# Patient Record
Sex: MALE | Race: WHITE | NOT HISPANIC OR LATINO | Employment: OTHER | URBAN - METROPOLITAN AREA
[De-identification: names, ages, dates, MRNs, and addresses within clinical notes are randomized per-mention and may not be internally consistent; named-entity substitution may affect disease eponyms.]

---

## 2017-02-02 ENCOUNTER — OFFICE VISIT (OUTPATIENT)
Dept: INTERNAL MEDICINE | Facility: CLINIC | Age: 82
End: 2017-02-02

## 2017-02-02 VITALS
TEMPERATURE: 97.3 F | RESPIRATION RATE: 16 BRPM | SYSTOLIC BLOOD PRESSURE: 152 MMHG | DIASTOLIC BLOOD PRESSURE: 62 MMHG | WEIGHT: 181.8 LBS | HEART RATE: 68 BPM

## 2017-02-02 DIAGNOSIS — R73.02 IGT (IMPAIRED GLUCOSE TOLERANCE): ICD-10-CM

## 2017-02-02 DIAGNOSIS — R42 EPISODIC LIGHTHEADEDNESS: ICD-10-CM

## 2017-02-02 DIAGNOSIS — K21.9 GASTROESOPHAGEAL REFLUX DISEASE, ESOPHAGITIS PRESENCE NOT SPECIFIED: ICD-10-CM

## 2017-02-02 DIAGNOSIS — R53.82 CHRONIC FATIGUE: ICD-10-CM

## 2017-02-02 DIAGNOSIS — G25.81 RLS (RESTLESS LEGS SYNDROME): Primary | ICD-10-CM

## 2017-02-02 PROBLEM — M54.30 SCIATICA: Status: ACTIVE | Noted: 2017-02-02

## 2017-02-02 PROBLEM — N40.0 BPH (BENIGN PROSTATIC HYPERPLASIA): Status: ACTIVE | Noted: 2017-02-02

## 2017-02-02 PROBLEM — Z79.899 MEDICATION MANAGEMENT: Status: ACTIVE | Noted: 2017-02-02

## 2017-02-02 PROCEDURE — 99214 OFFICE O/P EST MOD 30 MIN: CPT | Performed by: INTERNAL MEDICINE

## 2017-02-02 PROCEDURE — 93000 ELECTROCARDIOGRAM COMPLETE: CPT | Performed by: INTERNAL MEDICINE

## 2017-02-02 RX ORDER — LANSOPRAZOLE 30 MG/1
30 CAPSULE, DELAYED RELEASE ORAL DAILY
COMMUNITY
End: 2017-05-30 | Stop reason: SDUPTHER

## 2017-02-02 RX ORDER — PRAMIPEXOLE DIHYDROCHLORIDE 0.5 MG/1
1 TABLET ORAL NIGHTLY
Qty: 180 TABLET | Refills: 1 | Status: SHIPPED | OUTPATIENT
Start: 2017-02-02 | End: 2017-05-30 | Stop reason: SDUPTHER

## 2017-02-02 RX ORDER — PRAMIPEXOLE DIHYDROCHLORIDE 0.5 MG/1
0.5 TABLET ORAL 2 TIMES DAILY
Qty: 180 TABLET | Refills: 0 | Status: SHIPPED | OUTPATIENT
Start: 2017-02-02 | End: 2017-02-02

## 2017-02-02 RX ORDER — PRAMIPEXOLE DIHYDROCHLORIDE 0.5 MG/1
0.5 TABLET ORAL 3 TIMES DAILY
COMMUNITY
End: 2017-02-02

## 2017-02-03 LAB
ALBUMIN SERPL-MCNC: 4.3 G/DL (ref 3.5–4.7)
ALBUMIN/GLOB SERPL: 1.5 {RATIO} (ref 1.1–2.5)
ALP SERPL-CCNC: 67 IU/L (ref 39–117)
ALT SERPL-CCNC: 12 IU/L (ref 0–44)
AST SERPL-CCNC: 16 IU/L (ref 0–40)
BASOPHILS # BLD AUTO: 0 X10E3/UL (ref 0–0.2)
BASOPHILS NFR BLD AUTO: 0 %
BILIRUB SERPL-MCNC: 0.5 MG/DL (ref 0–1.2)
BUN SERPL-MCNC: 21 MG/DL (ref 8–27)
BUN/CREAT SERPL: 21 (ref 10–22)
CALCIUM SERPL-MCNC: 9.6 MG/DL (ref 8.6–10.2)
CHLORIDE SERPL-SCNC: 101 MMOL/L (ref 96–106)
CO2 SERPL-SCNC: 25 MMOL/L (ref 18–29)
CREAT SERPL-MCNC: 1.02 MG/DL (ref 0.76–1.27)
EOSINOPHIL # BLD AUTO: 0.1 X10E3/UL (ref 0–0.4)
EOSINOPHIL NFR BLD AUTO: 1 %
ERYTHROCYTE [DISTWIDTH] IN BLOOD BY AUTOMATED COUNT: 15.4 % (ref 12.3–15.4)
GLOBULIN SER CALC-MCNC: 2.8 G/DL (ref 1.5–4.5)
GLUCOSE SERPL-MCNC: 117 MG/DL (ref 65–99)
HBA1C MFR BLD: 6.2 % (ref 4.8–5.6)
HCT VFR BLD AUTO: 41.1 % (ref 37.5–51)
HGB BLD-MCNC: 13.6 G/DL (ref 12.6–17.7)
IMM GRANULOCYTES # BLD: 0 X10E3/UL (ref 0–0.1)
IMM GRANULOCYTES NFR BLD: 0 %
LYMPHOCYTES # BLD AUTO: 1.9 X10E3/UL (ref 0.7–3.1)
LYMPHOCYTES NFR BLD AUTO: 21 %
MCH RBC QN AUTO: 27.1 PG (ref 26.6–33)
MCHC RBC AUTO-ENTMCNC: 33.1 G/DL (ref 31.5–35.7)
MCV RBC AUTO: 82 FL (ref 79–97)
MONOCYTES # BLD AUTO: 0.5 X10E3/UL (ref 0.1–0.9)
MONOCYTES NFR BLD AUTO: 6 %
NEUTROPHILS # BLD AUTO: 6.7 X10E3/UL (ref 1.4–7)
NEUTROPHILS NFR BLD AUTO: 72 %
PLATELET # BLD AUTO: 224 X10E3/UL (ref 150–379)
POTASSIUM SERPL-SCNC: 4.6 MMOL/L (ref 3.5–5.2)
PROT SERPL-MCNC: 7.1 G/DL (ref 6–8.5)
RBC # BLD AUTO: 5.01 X10E6/UL (ref 4.14–5.8)
SODIUM SERPL-SCNC: 140 MMOL/L (ref 134–144)
WBC # BLD AUTO: 9.3 X10E3/UL (ref 3.4–10.8)

## 2017-02-07 ENCOUNTER — APPOINTMENT (OUTPATIENT)
Dept: NEUROLOGY | Facility: HOSPITAL | Age: 82
End: 2017-02-07
Attending: INTERNAL MEDICINE

## 2017-02-08 ENCOUNTER — APPOINTMENT (OUTPATIENT)
Dept: NEUROLOGY | Facility: HOSPITAL | Age: 82
End: 2017-02-08
Attending: INTERNAL MEDICINE

## 2017-02-09 ENCOUNTER — HOSPITAL ENCOUNTER (OUTPATIENT)
Dept: NEUROLOGY | Facility: HOSPITAL | Age: 82
Discharge: HOME | End: 2017-02-09
Attending: INTERNAL MEDICINE | Admitting: INTERNAL MEDICINE

## 2017-02-09 DIAGNOSIS — R42 EPISODIC LIGHTHEADEDNESS: ICD-10-CM

## 2017-02-09 PROCEDURE — 95816 EEG AWAKE AND DROWSY: CPT

## 2017-02-09 PROCEDURE — 95816 EEG AWAKE AND DROWSY: CPT | Performed by: PSYCHIATRY & NEUROLOGY

## 2017-03-06 ENCOUNTER — OFFICE VISIT (OUTPATIENT)
Dept: INTERNAL MEDICINE | Facility: CLINIC | Age: 82
End: 2017-03-06

## 2017-03-06 VITALS
SYSTOLIC BLOOD PRESSURE: 116 MMHG | DIASTOLIC BLOOD PRESSURE: 64 MMHG | WEIGHT: 178 LBS | TEMPERATURE: 98 F | BODY MASS INDEX: 24.11 KG/M2 | HEART RATE: 75 BPM | HEIGHT: 72 IN | OXYGEN SATURATION: 98 % | RESPIRATION RATE: 16 BRPM

## 2017-03-06 DIAGNOSIS — J06.9 VIRAL UPPER RESPIRATORY TRACT INFECTION: Primary | ICD-10-CM

## 2017-03-06 PROCEDURE — 99213 OFFICE O/P EST LOW 20 MIN: CPT | Performed by: INTERNAL MEDICINE

## 2017-03-06 RX ORDER — METHYLPREDNISOLONE 4 MG/1
TABLET ORAL
Qty: 1 EACH | Refills: 0 | Status: SHIPPED | OUTPATIENT
Start: 2017-03-06 | End: 2017-08-28

## 2017-03-06 RX ORDER — AZITHROMYCIN 250 MG/1
TABLET, FILM COATED ORAL
Qty: 6 TABLET | Refills: 0 | Status: SHIPPED | OUTPATIENT
Start: 2017-03-06 | End: 2017-08-28

## 2017-03-06 NOTE — PROGRESS NOTES
Subjective   Jim Romero is a 85 y.o. male.     Chief Complaint   Patient presents with   • Nasal Congestion     chest cold   • Cough     x 4 weeks, taking zpack, prednisone         Cough   This is a new problem. The current episode started 1 to 4 weeks ago. The problem has been unchanged. The problem occurs constantly. The cough is non-productive. Associated symptoms include chest pain, nasal congestion, postnasal drip, rhinorrhea and shortness of breath. Pertinent negatives include no chills, ear congestion, ear pain, fever, headaches, sore throat or wheezing. Nothing aggravates the symptoms. He has tried OTC cough suppressant for the symptoms. The treatment provided mild relief. There is no history of asthma, bronchiectasis, COPD or emphysema.        The following portions of the patient's history were reviewed and updated as appropriate: allergies, current medications, past social history and problem list.    Outpatient Prescriptions Marked as Taking for the 3/6/17 encounter (Office Visit) with Reji Gilman MD   Medication Sig Dispense Refill   • lansoprazole (PREVACID) 30 MG capsule Take 30 mg by mouth Daily.     • pramipexole (MIRAPEX) 0.5 MG tablet Take 2 tablets by mouth Every Night. 180 tablet 1   • tamsulosin (FLOMAX) 0.4 MG capsule 24 hr capsule TAKE TWO CAPSULES BY MOUTH DAILY 180 capsule 2       Review of Systems   Constitutional: Negative for chills, fatigue and fever.   HENT: Positive for congestion, postnasal drip and rhinorrhea. Negative for ear pain, sinus pressure, sore throat and voice change.    Respiratory: Positive for cough and shortness of breath. Negative for wheezing.    Cardiovascular: Positive for chest pain.   Neurological: Negative for headaches.       Objective   Vitals:    03/06/17 1105   BP: 116/64   Pulse: 75   Resp: 16   Temp: 98 °F (36.7 °C)   SpO2: 98%      Last Weight    03/06/17  1105   Weight: 178 lb (80.7 kg)    [unfilled]  Body mass index is 24.14  kg/(m^2).      Physical Exam   Constitutional: He appears well-developed and well-nourished.   HENT:   Head: Normocephalic and atraumatic.   Right Ear: External ear normal.   Left Ear: External ear normal.   Nose: Nose normal.   Mouth/Throat: Oropharynx is clear and moist.   Eyes: Conjunctivae are normal. Pupils are equal, round, and reactive to light.   Pulmonary/Chest: Effort normal and breath sounds normal. No respiratory distress. He has no wheezes. He has no rales.   Skin: Skin is warm and dry.         Problem List Items Addressed This Visit     None      Visit Diagnoses     Viral upper respiratory tract infection    -  Primary        Assessment/Plan   In with 1 month of cough.  Not much sputum production.  A congestion.  His wife and son had the same.  They both cleared up with a Z-Martin.  He took some Zithromax yesterday and it seemed to help.  He needs more.  His been taking OTC medicines.  Will okay another Z-Martin.  Advised to take the whole prescription.  We'll add a Medrol Dosepak.  So took some of his sons prednisone which helped.  He's due for Prevnar in TD AP when he gets better.  He has been a little short of breath the last few days but his lungs are clear on exam.         Dragon disclaimer:   Much of this encounter note is an electronic transcription/translation of spoken language to printed text. The electronic translation of spoken language may permit erroneous, or at times, nonsensical words or phrases to be inadvertently transcribed; Although I have reviewed the note for such errors, some may still exist.

## 2017-05-01 RX ORDER — LANSOPRAZOLE 30 MG/1
CAPSULE, DELAYED RELEASE ORAL
Qty: 90 CAPSULE | Refills: 0 | OUTPATIENT
Start: 2017-05-01

## 2017-05-01 RX ORDER — TAMSULOSIN HYDROCHLORIDE 0.4 MG/1
CAPSULE ORAL
Qty: 180 CAPSULE | Refills: 1 | OUTPATIENT
Start: 2017-05-01

## 2017-05-30 RX ORDER — LANSOPRAZOLE 30 MG/1
30 CAPSULE, DELAYED RELEASE ORAL DAILY
Qty: 90 CAPSULE | Refills: 3 | Status: SHIPPED | OUTPATIENT
Start: 2017-05-30 | End: 2017-08-28

## 2017-05-30 RX ORDER — PRAMIPEXOLE DIHYDROCHLORIDE 0.5 MG/1
1 TABLET ORAL NIGHTLY
Qty: 180 TABLET | Refills: 0 | Status: SHIPPED | OUTPATIENT
Start: 2017-05-30 | End: 2017-08-28

## 2017-05-30 RX ORDER — TAMSULOSIN HYDROCHLORIDE 0.4 MG/1
1 CAPSULE ORAL 2 TIMES DAILY
Qty: 180 CAPSULE | Refills: 0 | Status: SHIPPED | OUTPATIENT
Start: 2017-05-30 | End: 2017-08-20 | Stop reason: SDUPTHER

## 2017-06-05 ENCOUNTER — TELEPHONE (OUTPATIENT)
Dept: INTERNAL MEDICINE | Facility: CLINIC | Age: 82
End: 2017-06-05

## 2017-06-05 RX ORDER — PANTOPRAZOLE SODIUM 40 MG/1
40 TABLET, DELAYED RELEASE ORAL DAILY
Qty: 90 TABLET | Refills: 0 | Status: SHIPPED | OUTPATIENT
Start: 2017-06-05 | End: 2017-11-05 | Stop reason: SDUPTHER

## 2017-06-05 NOTE — TELEPHONE ENCOUNTER
Insurance declined to pay for lansoprazole but will approve any of the following:  Esomeprazole  Omeprazole  Pantoprazole  Pt would like to give one of these a try, with less side effects.  90 day supply    Try the pantoprazole 40 mg, 1 daily, #90, 3 refills.

## 2017-08-21 RX ORDER — TAMSULOSIN HYDROCHLORIDE 0.4 MG/1
CAPSULE ORAL
Qty: 180 CAPSULE | Refills: 0 | Status: SHIPPED | OUTPATIENT
Start: 2017-08-21 | End: 2017-11-12 | Stop reason: SDUPTHER

## 2017-08-28 ENCOUNTER — APPOINTMENT (OUTPATIENT)
Dept: LAB | Facility: HOSPITAL | Age: 82
End: 2017-08-28

## 2017-08-28 ENCOUNTER — OFFICE VISIT (OUTPATIENT)
Dept: INTERNAL MEDICINE | Facility: CLINIC | Age: 82
End: 2017-08-28

## 2017-08-28 VITALS
HEART RATE: 80 BPM | HEIGHT: 72 IN | TEMPERATURE: 98.8 F | SYSTOLIC BLOOD PRESSURE: 104 MMHG | RESPIRATION RATE: 16 BRPM | BODY MASS INDEX: 23.73 KG/M2 | WEIGHT: 175.2 LBS | DIASTOLIC BLOOD PRESSURE: 72 MMHG | OXYGEN SATURATION: 96 %

## 2017-08-28 DIAGNOSIS — R53.82 CHRONIC FATIGUE: ICD-10-CM

## 2017-08-28 DIAGNOSIS — R07.9 CHEST PAIN, UNSPECIFIED TYPE: Primary | ICD-10-CM

## 2017-08-28 LAB
ALBUMIN SERPL-MCNC: 3.9 G/DL (ref 3.5–5.2)
ALBUMIN/GLOB SERPL: 1.2 G/DL
ALP SERPL-CCNC: 58 U/L (ref 39–117)
ALT SERPL W P-5'-P-CCNC: 13 U/L (ref 1–41)
ANION GAP SERPL CALCULATED.3IONS-SCNC: 11.4 MMOL/L
AST SERPL-CCNC: 13 U/L (ref 1–40)
BASOPHILS # BLD AUTO: 0.04 10*3/MM3 (ref 0–0.2)
BASOPHILS NFR BLD AUTO: 0.4 % (ref 0–1.5)
BILIRUB SERPL-MCNC: 0.7 MG/DL (ref 0.1–1.2)
BUN BLD-MCNC: 26 MG/DL (ref 8–23)
BUN/CREAT SERPL: 22.2 (ref 7–25)
CALCIUM SPEC-SCNC: 9.5 MG/DL (ref 8.6–10.5)
CHLORIDE SERPL-SCNC: 103 MMOL/L (ref 98–107)
CO2 SERPL-SCNC: 26.6 MMOL/L (ref 22–29)
CREAT BLD-MCNC: 1.17 MG/DL (ref 0.76–1.27)
DEPRECATED RDW RBC AUTO: 45.7 FL (ref 37–54)
EOSINOPHIL # BLD AUTO: 0.15 10*3/MM3 (ref 0–0.7)
EOSINOPHIL NFR BLD AUTO: 1.7 % (ref 0.3–6.2)
ERYTHROCYTE [DISTWIDTH] IN BLOOD BY AUTOMATED COUNT: 14.7 % (ref 11.5–14.5)
GFR SERPL CREATININE-BSD FRML MDRD: 59 ML/MIN/1.73
GLOBULIN UR ELPH-MCNC: 3.3 GM/DL
GLUCOSE BLD-MCNC: 98 MG/DL (ref 65–99)
HCT VFR BLD AUTO: 40.7 % (ref 40.4–52.2)
HGB BLD-MCNC: 13.3 G/DL (ref 13.7–17.6)
IMM GRANULOCYTES # BLD: 0.02 10*3/MM3 (ref 0–0.03)
IMM GRANULOCYTES NFR BLD: 0.2 % (ref 0–0.5)
LYMPHOCYTES # BLD AUTO: 1.84 10*3/MM3 (ref 0.9–4.8)
LYMPHOCYTES NFR BLD AUTO: 20.5 % (ref 19.6–45.3)
MCH RBC QN AUTO: 27.5 PG (ref 27–32.7)
MCHC RBC AUTO-ENTMCNC: 32.7 G/DL (ref 32.6–36.4)
MCV RBC AUTO: 84.3 FL (ref 79.8–96.2)
MONOCYTES # BLD AUTO: 0.64 10*3/MM3 (ref 0.2–1.2)
MONOCYTES NFR BLD AUTO: 7.1 % (ref 5–12)
NEUTROPHILS # BLD AUTO: 6.27 10*3/MM3 (ref 1.9–8.1)
NEUTROPHILS NFR BLD AUTO: 70.1 % (ref 42.7–76)
PLATELET # BLD AUTO: 212 10*3/MM3 (ref 140–500)
PMV BLD AUTO: 10.6 FL (ref 6–12)
POTASSIUM BLD-SCNC: 4.4 MMOL/L (ref 3.5–5.2)
PROT SERPL-MCNC: 7.2 G/DL (ref 6–8.5)
RBC # BLD AUTO: 4.83 10*6/MM3 (ref 4.6–6)
SODIUM BLD-SCNC: 141 MMOL/L (ref 136–145)
TSH SERPL DL<=0.05 MIU/L-ACNC: 0.97 MIU/ML (ref 0.27–4.2)
WBC NRBC COR # BLD: 8.96 10*3/MM3 (ref 4.5–10.7)

## 2017-08-28 PROCEDURE — 80053 COMPREHEN METABOLIC PANEL: CPT | Performed by: INTERNAL MEDICINE

## 2017-08-28 PROCEDURE — 93000 ELECTROCARDIOGRAM COMPLETE: CPT | Performed by: INTERNAL MEDICINE

## 2017-08-28 PROCEDURE — 99214 OFFICE O/P EST MOD 30 MIN: CPT | Performed by: INTERNAL MEDICINE

## 2017-08-28 PROCEDURE — 84443 ASSAY THYROID STIM HORMONE: CPT | Performed by: INTERNAL MEDICINE

## 2017-08-28 PROCEDURE — 36415 COLL VENOUS BLD VENIPUNCTURE: CPT | Performed by: INTERNAL MEDICINE

## 2017-08-28 PROCEDURE — 85025 COMPLETE CBC W/AUTO DIFF WBC: CPT | Performed by: INTERNAL MEDICINE

## 2017-08-28 NOTE — PROGRESS NOTES
Subjective   Jim Romero is a 86 y.o. male.     Chief Complaint   Patient presents with   • Chest Pain     CHEST TIGHTNESS, SOB FOR 30 MINS ON FRIDAY NIGHT         Chest Pain    This is a new problem. The current episode started in the past 7 days. The onset quality is gradual. The problem occurs constantly. The problem has been resolved. The pain is present in the substernal region. The pain is severe. The quality of the pain is described as heavy and tightness. The pain does not radiate. Associated symptoms include malaise/fatigue. Pertinent negatives include no back pain, exertional chest pressure, nausea, palpitations, shortness of breath or vomiting. The pain is aggravated by nothing. He has tried nothing for the symptoms. Risk factors include male gender, smoking/tobacco exposure and being elderly.   Pertinent negatives for past medical history include no MI.        The following portions of the patient's history were reviewed and updated as appropriate: allergies, current medications, past social history and problem list.    Outpatient Prescriptions Marked as Taking for the 8/28/17 encounter (Office Visit) with Reji Gilman MD   Medication Sig Dispense Refill   • lansoprazole (PREVACID) 30 MG capsule Take 1 capsule by mouth Daily for 90 days. 90 capsule 3   • pantoprazole (PROTONIX) 40 MG EC tablet Take 1 tablet by mouth Daily for 90 days. 90 tablet 0   • pramipexole (MIRAPEX) 0.5 MG tablet Take 2 tablets by mouth Every Night for 90 days. 180 tablet 0   • tamsulosin (FLOMAX) 0.4 MG capsule 24 hr capsule TAKE ONE CAPSULE BY MOUTH TWICE A  capsule 0       Review of Systems   Constitutional: Positive for fatigue and malaise/fatigue.   Respiratory: Negative for shortness of breath.    Cardiovascular: Positive for chest pain. Negative for palpitations.   Gastrointestinal: Negative for nausea and vomiting.   Musculoskeletal: Negative for back pain.       Objective   Vitals:    08/28/17 1556   BP: 104/72    Pulse: 80   Resp: 16   Temp: 98.8 °F (37.1 °C)   SpO2: 96%      Last Weight    08/28/17  1556   Weight: 175 lb 3.2 oz (79.5 kg)    [unfilled]  Body mass index is 23.76 kg/(m^2).      Physical Exam   Constitutional: He appears well-developed and well-nourished.   Cardiovascular: Normal rate, regular rhythm, normal heart sounds and intact distal pulses.  Exam reveals no friction rub.    No murmur heard.  Pulmonary/Chest: Effort normal and breath sounds normal. No respiratory distress. He has no wheezes. He has no rales.   Abdominal: Soft. Bowel sounds are normal. He exhibits no distension and no mass. There is no tenderness. There is no guarding.         Problem List Items Addressed This Visit     None      Visit Diagnoses     Chest pain, unspecified type    -  Primary        Assessment/Plan   Patient is in today with a spell of chest pain that occurred at the [facility] 3 days ago.  As was a low substernal tightness or heaviness.  It lasted about 30 minutes.  No associated shortness of breath but wife states that he was sweaty.  He had to sit down.  With standing watching his grandchildren on the right when it occurred.  The fair offcials came by with a golf cart  and took him to his car.  He was complaining of fatigue.  He's been fatigued on and off for several months now to an unusual degree.  Mostly the end of the day.  Apparently there was a similar spell a month or 2 ago.  He has no cardiac history.  We'll need to check a CBC, CMP, and TSH today.  EKG is unchanged.  T waves are tall in the V2 and V3 leads and chronically so.  If there is no metabolic disturbance I do think he is going to require a stress test.  Probably a stress echo.  He still plays golf regularly has been tiring him out more and more.      ECG 12 Lead  Date/Time: 8/28/2017 5:37 PM  Performed by: DIMITRI SHRESTHA  Authorized by: DIMITRI SHRESTHA   Comparison: compared with previous ECG from 2/2/2017  Similar to previous ECG  Rhythm: sinus  rhythm  Rate: normal  Conduction: conduction normal  ST Segments: ST segments normal  T elevation: V2 and V3  QRS axis: normal  Other: no other findings  Clinical impression: non-specific ECG  Comments: The T waves in leads V2 and V3 are tall.  They are unchanged from prior EKG did 2/2/17.  The EKG is unchanged.                 Dragon disclaimer:   Much of this encounter note is an electronic transcription/translation of spoken language to printed text. The electronic translation of spoken language may permit erroneous, or at times, nonsensical words or phrases to be inadvertently transcribed; Although I have reviewed the note for such errors, some may still exist.

## 2017-08-29 DIAGNOSIS — I20.9 NEW-ONSET ANGINA (HCC): ICD-10-CM

## 2017-08-29 DIAGNOSIS — R07.9 CHEST PAIN, UNSPECIFIED TYPE: Primary | ICD-10-CM

## 2017-08-31 ENCOUNTER — HOSPITAL ENCOUNTER (OUTPATIENT)
Dept: CARDIOLOGY | Facility: HOSPITAL | Age: 82
Discharge: HOME | End: 2017-08-31
Attending: INTERNAL MEDICINE | Admitting: INTERNAL MEDICINE

## 2017-08-31 VITALS
HEART RATE: 70 BPM | BODY MASS INDEX: 23.7 KG/M2 | HEIGHT: 72 IN | DIASTOLIC BLOOD PRESSURE: 63 MMHG | WEIGHT: 175 LBS | SYSTOLIC BLOOD PRESSURE: 129 MMHG

## 2017-08-31 LAB
BH CV ECHO MEAS - ACS: 1.6 CM
BH CV ECHO MEAS - AI DEC SLOPE: 209 CM/SEC^2
BH CV ECHO MEAS - AI MAX PG: 94.5 MMHG
BH CV ECHO MEAS - AI MAX VEL: 486 CM/SEC
BH CV ECHO MEAS - AI P1/2T: 681.1 MSEC
BH CV ECHO MEAS - AO MAX PG (FULL): 6.8 MMHG
BH CV ECHO MEAS - AO MAX PG: 10.9 MMHG
BH CV ECHO MEAS - AO MEAN PG (FULL): 4 MMHG
BH CV ECHO MEAS - AO MEAN PG: 6 MMHG
BH CV ECHO MEAS - AO ROOT AREA (BSA CORRECTED): 1.7
BH CV ECHO MEAS - AO ROOT AREA: 9.6 CM^2
BH CV ECHO MEAS - AO ROOT DIAM: 3.5 CM
BH CV ECHO MEAS - AO V2 MAX: 165 CM/SEC
BH CV ECHO MEAS - AO V2 MEAN: 113 CM/SEC
BH CV ECHO MEAS - AO V2 VTI: 37.5 CM
BH CV ECHO MEAS - ASC AORTA: 3.5 CM
BH CV ECHO MEAS - AVA(I,A): 1.9 CM^2
BH CV ECHO MEAS - AVA(I,D): 1.9 CM^2
BH CV ECHO MEAS - AVA(V,A): 1.9 CM^2
BH CV ECHO MEAS - AVA(V,D): 1.9 CM^2
BH CV ECHO MEAS - BSA(HAYCOCK): 2 M^2
BH CV ECHO MEAS - BSA: 2 M^2
BH CV ECHO MEAS - BZI_BMI: 23.7 KILOGRAMS/M^2
BH CV ECHO MEAS - BZI_METRIC_HEIGHT: 182.9 CM
BH CV ECHO MEAS - BZI_METRIC_WEIGHT: 79.4 KG
BH CV ECHO MEAS - EDV(CUBED): 110.6 ML
BH CV ECHO MEAS - EDV(MOD-SP2): 163 ML
BH CV ECHO MEAS - EDV(MOD-SP4): 191 ML
BH CV ECHO MEAS - EDV(TEICH): 107.5 ML
BH CV ECHO MEAS - EF(CUBED): 73.1 %
BH CV ECHO MEAS - EF(MOD-SP2): 63 %
BH CV ECHO MEAS - EF(MOD-SP4): 60 %
BH CV ECHO MEAS - EF(TEICH): 64.7 %
BH CV ECHO MEAS - ESV(CUBED): 29.8 ML
BH CV ECHO MEAS - ESV(MOD-SP2): 60 ML
BH CV ECHO MEAS - ESV(MOD-SP4): 60 ML
BH CV ECHO MEAS - ESV(TEICH): 37.9 ML
BH CV ECHO MEAS - FS: 35.4 %
BH CV ECHO MEAS - IVS/LVPW: 1
BH CV ECHO MEAS - IVSD: 1.2 CM
BH CV ECHO MEAS - LAT PEAK E' VEL: 8 CM/SEC
BH CV ECHO MEAS - LV MASS(C)D: 219.1 GRAMS
BH CV ECHO MEAS - LV MASS(C)DI: 108.9 GRAMS/M^2
BH CV ECHO MEAS - LV MAX PG: 4.1 MMHG
BH CV ECHO MEAS - LV MEAN PG: 2 MMHG
BH CV ECHO MEAS - LV V1 MAX: 101 CM/SEC
BH CV ECHO MEAS - LV V1 MEAN: 63.1 CM/SEC
BH CV ECHO MEAS - LV V1 VTI: 22.8 CM
BH CV ECHO MEAS - LVIDD: 4.8 CM
BH CV ECHO MEAS - LVIDS: 3.1 CM
BH CV ECHO MEAS - LVOT AREA (M): 3.1 CM^2
BH CV ECHO MEAS - LVOT AREA: 3.1 CM^2
BH CV ECHO MEAS - LVOT DIAM: 2 CM
BH CV ECHO MEAS - LVPWD: 1.2 CM
BH CV ECHO MEAS - MED PEAK E' VEL: 7 CM/SEC
BH CV ECHO MEAS - MV A DUR: 107 SEC
BH CV ECHO MEAS - MV A MAX VEL: 66 CM/SEC
BH CV ECHO MEAS - MV DEC SLOPE: 311 CM/SEC^2
BH CV ECHO MEAS - MV DEC TIME: 187 SEC
BH CV ECHO MEAS - MV E MAX VEL: 61.3 CM/SEC
BH CV ECHO MEAS - MV E/A: 0.93
BH CV ECHO MEAS - MV MAX PG: 3 MMHG
BH CV ECHO MEAS - MV MEAN PG: 1 MMHG
BH CV ECHO MEAS - MV P1/2T MAX VEL: 63.6 CM/SEC
BH CV ECHO MEAS - MV P1/2T: 59.9 MSEC
BH CV ECHO MEAS - MV V2 MAX: 87 CM/SEC
BH CV ECHO MEAS - MV V2 MEAN: 44.2 CM/SEC
BH CV ECHO MEAS - MV V2 VTI: 22.5 CM
BH CV ECHO MEAS - MVA P1/2T LCG: 3.5 CM^2
BH CV ECHO MEAS - MVA(P1/2T): 3.7 CM^2
BH CV ECHO MEAS - MVA(VTI): 3.2 CM^2
BH CV ECHO MEAS - PA ACC TIME: 0.12 SEC
BH CV ECHO MEAS - PA MAX PG (FULL): 2.6 MMHG
BH CV ECHO MEAS - PA MAX PG: 5.2 MMHG
BH CV ECHO MEAS - PA PR(ACCEL): 26.8 MMHG
BH CV ECHO MEAS - PA V2 MAX: 114 CM/SEC
BH CV ECHO MEAS - PI END-D VEL: 63 CM/SEC
BH CV ECHO MEAS - PULM A REVS DUR: 127 SEC
BH CV ECHO MEAS - PULM A REVS VEL: 19.3 CM/SEC
BH CV ECHO MEAS - PULM DIAS VEL: 35.1 CM/SEC
BH CV ECHO MEAS - PULM S/D: 1.2
BH CV ECHO MEAS - PULM SYS VEL: 42.4 CM/SEC
BH CV ECHO MEAS - PVA(V,A): 3.7 CM^2
BH CV ECHO MEAS - PVA(V,D): 3.7 CM^2
BH CV ECHO MEAS - QP/QS: 1.4
BH CV ECHO MEAS - RAP SYSTOLE: 3 MMHG
BH CV ECHO MEAS - RV MAX PG: 2.6 MMHG
BH CV ECHO MEAS - RV MEAN PG: 1 MMHG
BH CV ECHO MEAS - RV V1 MAX: 80.5 CM/SEC
BH CV ECHO MEAS - RV V1 MEAN: 50 CM/SEC
BH CV ECHO MEAS - RV V1 VTI: 19.5 CM
BH CV ECHO MEAS - RVOT AREA: 5.3 CM^2
BH CV ECHO MEAS - RVOT DIAM: 2.6 CM
BH CV ECHO MEAS - RVSP: 33 MMHG
BH CV ECHO MEAS - SI(AO): 179.2 ML/M^2
BH CV ECHO MEAS - SI(CUBED): 40.1 ML/M^2
BH CV ECHO MEAS - SI(LVOT): 35.6 ML/M^2
BH CV ECHO MEAS - SI(TEICH): 34.6 ML/M^2
BH CV ECHO MEAS - SV(AO): 360.8 ML
BH CV ECHO MEAS - SV(CUBED): 80.8 ML
BH CV ECHO MEAS - SV(LVOT): 71.6 ML
BH CV ECHO MEAS - SV(RVOT): 103.5 ML
BH CV ECHO MEAS - SV(TEICH): 69.6 ML
BH CV ECHO MEAS - TAPSE (>1.6): 3 CM2
BH CV ECHO MEAS - TR MAX VEL: 274 CM/SEC
BH CV STRESS ECHO POST STRESS EJECTION FRACTION EF: 75 %
BH CV VAS BP RIGHT ARM: NORMAL MMHG
BH CV XLRA - RV BASE: 2.7 CM
BH CV XLRA - TDI S': 11 CM/SEC
E/E' RATIO: 9
LEFT ATRIUM VOLUME INDEX: 24 ML/M2
LV EF 2D ECHO EST: 60 %
MAXIMAL PREDICTED HEART RATE: 134 BPM
STRESS TARGET HR: 114 BPM

## 2017-08-31 PROCEDURE — 93320 DOPPLER ECHO COMPLETE: CPT | Performed by: INTERNAL MEDICINE

## 2017-08-31 PROCEDURE — C8928 TTE W OR W/O FOL W/CON,STRES: HCPCS

## 2017-08-31 PROCEDURE — 93352 ADMIN ECG CONTRAST AGENT: CPT | Performed by: INTERNAL MEDICINE

## 2017-08-31 PROCEDURE — 25010000002 PERFLUTREN (DEFINITY) 8.476 MG IN SODIUM CHLORIDE 10 ML INJECTION: Performed by: INTERNAL MEDICINE

## 2017-08-31 PROCEDURE — 93017 CV STRESS TEST TRACING ONLY: CPT

## 2017-08-31 PROCEDURE — 93016 CV STRESS TEST SUPVJ ONLY: CPT | Performed by: INTERNAL MEDICINE

## 2017-08-31 PROCEDURE — 93350 STRESS TTE ONLY: CPT | Performed by: INTERNAL MEDICINE

## 2017-08-31 PROCEDURE — 93325 DOPPLER ECHO COLOR FLOW MAPG: CPT | Performed by: INTERNAL MEDICINE

## 2017-08-31 PROCEDURE — 93018 CV STRESS TEST I&R ONLY: CPT | Performed by: INTERNAL MEDICINE

## 2017-08-31 PROCEDURE — 93325 DOPPLER ECHO COLOR FLOW MAPG: CPT

## 2017-08-31 PROCEDURE — 93320 DOPPLER ECHO COMPLETE: CPT

## 2017-08-31 RX ADMIN — SODIUM CHLORIDE 8 ML: 9 INJECTION INTRAMUSCULAR; INTRAVENOUS; SUBCUTANEOUS at 10:45

## 2017-11-06 RX ORDER — PANTOPRAZOLE SODIUM 40 MG/1
TABLET, DELAYED RELEASE ORAL
Qty: 90 TABLET | Refills: 0 | Status: SHIPPED | OUTPATIENT
Start: 2017-11-06 | End: 2018-02-11 | Stop reason: SDUPTHER

## 2017-11-13 RX ORDER — TAMSULOSIN HYDROCHLORIDE 0.4 MG/1
CAPSULE ORAL
Qty: 180 CAPSULE | Refills: 0 | Status: SHIPPED | OUTPATIENT
Start: 2017-11-13 | End: 2018-02-11 | Stop reason: SDUPTHER

## 2018-01-02 ENCOUNTER — TELEPHONE (OUTPATIENT)
Dept: INTERNAL MEDICINE | Facility: CLINIC | Age: 83
End: 2018-01-02

## 2018-01-02 RX ORDER — OSELTAMIVIR PHOSPHATE 75 MG/1
75 CAPSULE ORAL
Qty: 10 CAPSULE | Refills: 0 | Status: SHIPPED | OUTPATIENT
Start: 2018-01-02 | End: 2018-01-07

## 2018-01-02 RX ORDER — PROMETHAZINE HYDROCHLORIDE 25 MG/1
25 SUPPOSITORY RECTAL EVERY 6 HOURS PRN
Qty: 10 SUPPOSITORY | Refills: 0 | Status: SHIPPED | OUTPATIENT
Start: 2018-01-02 | End: 2018-05-17

## 2018-01-02 NOTE — TELEPHONE ENCOUNTER
Pt's wife called stating that pt has gotten the flu today, with symptoms: nausea, vomiting, fever, body aches, and is unable to get up from bed.  Requesting a suppository for nausea/vomiting.  I recommended he come in, but May states that pt is  unable to leave his bed.  Please advise.    Okay to prescribe Phenergan suppository 25 mg every 6 hours when necessary nausea and vomiting.  #10.  Also begin Tamiflu 75 mg twice a day for 5 days.

## 2018-02-12 RX ORDER — TAMSULOSIN HYDROCHLORIDE 0.4 MG/1
CAPSULE ORAL
Qty: 180 CAPSULE | Refills: 0 | Status: SHIPPED | OUTPATIENT
Start: 2018-02-12 | End: 2018-05-17 | Stop reason: SDUPTHER

## 2018-02-12 RX ORDER — PANTOPRAZOLE SODIUM 40 MG/1
TABLET, DELAYED RELEASE ORAL
Qty: 90 TABLET | Refills: 0 | Status: SHIPPED | OUTPATIENT
Start: 2018-02-12 | End: 2018-05-17 | Stop reason: SDUPTHER

## 2018-02-12 RX ORDER — PRAMIPEXOLE DIHYDROCHLORIDE 0.5 MG/1
TABLET ORAL
Qty: 180 TABLET | Refills: 0 | Status: SHIPPED | OUTPATIENT
Start: 2018-02-12 | End: 2018-05-17 | Stop reason: SDUPTHER

## 2018-05-14 RX ORDER — PANTOPRAZOLE SODIUM 40 MG/1
TABLET, DELAYED RELEASE ORAL
Qty: 90 TABLET | Refills: 0 | OUTPATIENT
Start: 2018-05-14

## 2018-05-14 RX ORDER — TAMSULOSIN HYDROCHLORIDE 0.4 MG/1
CAPSULE ORAL
Qty: 180 CAPSULE | Refills: 0 | OUTPATIENT
Start: 2018-05-14

## 2018-05-14 RX ORDER — PRAMIPEXOLE DIHYDROCHLORIDE 0.5 MG/1
TABLET ORAL
Qty: 180 TABLET | Refills: 0 | OUTPATIENT
Start: 2018-05-14

## 2018-05-17 ENCOUNTER — OFFICE VISIT (OUTPATIENT)
Dept: INTERNAL MEDICINE | Facility: CLINIC | Age: 83
End: 2018-05-17

## 2018-05-17 VITALS
DIASTOLIC BLOOD PRESSURE: 62 MMHG | TEMPERATURE: 97.9 F | OXYGEN SATURATION: 94 % | RESPIRATION RATE: 16 BRPM | HEART RATE: 78 BPM | HEIGHT: 72 IN | SYSTOLIC BLOOD PRESSURE: 114 MMHG | WEIGHT: 178.8 LBS | BODY MASS INDEX: 24.22 KG/M2

## 2018-05-17 DIAGNOSIS — N40.1 BENIGN PROSTATIC HYPERPLASIA WITH NOCTURIA: ICD-10-CM

## 2018-05-17 DIAGNOSIS — K21.9 GASTROESOPHAGEAL REFLUX DISEASE, ESOPHAGITIS PRESENCE NOT SPECIFIED: ICD-10-CM

## 2018-05-17 DIAGNOSIS — G25.81 RLS (RESTLESS LEGS SYNDROME): ICD-10-CM

## 2018-05-17 DIAGNOSIS — R35.1 BENIGN PROSTATIC HYPERPLASIA WITH NOCTURIA: ICD-10-CM

## 2018-05-17 DIAGNOSIS — Z23 NEED FOR VACCINATION: ICD-10-CM

## 2018-05-17 DIAGNOSIS — R73.02 IGT (IMPAIRED GLUCOSE TOLERANCE): Primary | ICD-10-CM

## 2018-05-17 PROCEDURE — G0009 ADMIN PNEUMOCOCCAL VACCINE: HCPCS | Performed by: INTERNAL MEDICINE

## 2018-05-17 PROCEDURE — 90670 PCV13 VACCINE IM: CPT | Performed by: INTERNAL MEDICINE

## 2018-05-17 PROCEDURE — G0439 PPPS, SUBSEQ VISIT: HCPCS | Performed by: INTERNAL MEDICINE

## 2018-05-17 PROCEDURE — 90471 IMMUNIZATION ADMIN: CPT | Performed by: INTERNAL MEDICINE

## 2018-05-17 PROCEDURE — 90715 TDAP VACCINE 7 YRS/> IM: CPT | Performed by: INTERNAL MEDICINE

## 2018-05-17 PROCEDURE — 99213 OFFICE O/P EST LOW 20 MIN: CPT | Performed by: INTERNAL MEDICINE

## 2018-05-17 NOTE — PROGRESS NOTES
QUICK REFERENCE INFORMATION:  The ABCs of the Annual Wellness Visit    Subsequent Medicare Wellness Visit    HEALTH RISK ASSESSMENT    3/17/1931    Recent Hospitalizations:  No hospitalization(s) within the last year..        Current Medical Providers:  Patient Care Team:  Reji Gilman MD as PCP - Internal Medicine (Internal Medicine)  Reji Gilman MD as PCP - Claims Attributed        Smoking Status:  History   Smoking Status   • Former Smoker   • Quit date: 1959   Smokeless Tobacco   • Never Used       Alcohol Consumption:  History   Alcohol Use   • Yes     Comment: 1-2 x month       Depression Screen:   PHQ-2/PHQ-9 Depression Screening 5/17/2018   Little interest or pleasure in doing things 0   Feeling down, depressed, or hopeless 0   Trouble falling or staying asleep, or sleeping too much 3   Feeling tired or having little energy 3   Poor appetite or overeating 2   Feeling bad about yourself - or that you are a failure or have let yourself or your family down 0   Trouble concentrating on things, such as reading the newspaper or watching television 0   Moving or speaking so slowly that other people could have noticed. Or the opposite - being so fidgety or restless that you have been moving around a lot more than usual 0   Thoughts that you would be better off dead, or of hurting yourself in some way 0   Total Score 8   If you checked off any problems, how difficult have these problems made it for you to do your work, take care of things at home, or get along with other people? Somewhat difficult       Health Habits and Functional and Cognitive Screening:  Functional & Cognitive Status 5/17/2018   Do you have difficulty preparing food and eating? No   Do you have difficulty bathing yourself, getting dressed or grooming yourself? No   Do you have difficulty using the toilet? No   Do you have difficulty moving around from place to place? No   Do you have trouble with steps or getting out of a bed or a chair? No    In the past year have you fallen or experienced a near fall? No   Current Diet Well Balanced Diet   Dental Exam Up to date   Eye Exam Not up to date   Exercise (times per week) 4 times per week   Current Exercise Activities Include Yard Work   Do you need help using the phone?  No   Are you deaf or do you have serious difficulty hearing?  Yes   Do you need help with transportation? No   Do you need help shopping? No   Do you need help preparing meals?  Yes   Do you need help with housework?  No   Do you need help with laundry? No   Do you need help taking your medications? No   Do you need help managing money? No   Do you ever drive or ride in a car without wearing a seat belt? No   Have you felt unusual stress, anger or loneliness in the last month? No   Who do you live with? Spouse   If you need help, do you have trouble finding someone available to you? No   Have you been bothered in the last four weeks by sexual problems? No   Do you have difficulty concentrating, remembering or making decisions? No           Does the patient have evidence of cognitive impairment? No    Aspirin use counseling: Does not need ASA (and currently is not on it)      Recent Lab Results:  CMP:  Lab Results   Component Value Date     (H) 02/02/2017    BUN 26 (H) 08/28/2017    CREATININE 1.17 08/28/2017    EGFRIFNONA 59 (L) 08/28/2017    EGFRIFAFRI 77 02/02/2017    BCR 22.2 08/28/2017     08/28/2017    K 4.4 08/28/2017    CO2 26.6 08/28/2017    CALCIUM 9.5 08/28/2017    PROTENTOTREF 7.1 02/02/2017    ALBUMIN 3.90 08/28/2017    LABGLOBREF 2.8 02/02/2017    LABIL2 1.2 08/28/2017    BILITOT 0.7 08/28/2017    ALKPHOS 58 08/28/2017    AST 13 08/28/2017    ALT 13 08/28/2017     Lipid Panel:     HbA1c:  Lab Results   Component Value Date    HGBA1C 6.2 (H) 02/02/2017       Visual Acuity:  No exam data present    Age-appropriate Screening Schedule:  Refer to the list below for future screening recommendations based on patient's  age, sex and/or medical conditions. Orders for these recommended tests are listed in the plan section. The patient has been provided with a written plan.    Health Maintenance   Topic Date Due   • TDAP/TD VACCINES (1 - Tdap) 03/17/1950   • PNEUMOCOCCAL VACCINES (65+ LOW/MEDIUM RISK) (1 of 2 - PCV13) 03/17/1996   • INFLUENZA VACCINE  08/01/2018   • ZOSTER VACCINE  Excluded        Subjective   History of Present Illness    Jim Romero is a 87 y.o. male who presents for an Subsequent Wellness Visit.    The following portions of the patient's history were reviewed and updated as appropriate: allergies, current medications, past family history, past medical history, past social history, past surgical history and problem list.    Outpatient Medications Prior to Visit   Medication Sig Dispense Refill   • pantoprazole (PROTONIX) 40 MG EC tablet TAKE ONE TABLET BY MOUTH DAILY 90 tablet 0   • pramipexole (MIRAPEX) 0.5 MG tablet TAKE ONE TABLET BY MOUTH TWICE A  tablet 0   • tamsulosin (FLOMAX) 0.4 MG capsule 24 hr capsule TAKE ONE CAPSULE BY MOUTH TWICE A  capsule 0   • promethazine (PHENERGAN) 25 MG suppository Insert 1 suppository into the rectum Every 6 (Six) Hours As Needed for Nausea or Vomiting. 10 suppository 0     No facility-administered medications prior to visit.        Patient Active Problem List   Diagnosis   • RLS (restless legs syndrome)   • BPH (benign prostatic hyperplasia)   • GERD (gastroesophageal reflux disease)   • Sciatica   • Chronic fatigue   • IGT (impaired glucose tolerance)       Advance Care Planning:  has an advance directive - a copy HAS NOT been provided. Have asked the patient to send this to us to add to record.    Identification of Risk Factors:  Risk factors include: inactivity and hearing limitations.    Review of Systems    Compared to one year ago, the patient feels his physical health is the same.  Compared to one year ago, the patient feels his mental health is the  "same.    Objective     Physical Exam    Vitals:    05/17/18 1443   BP: 114/62   Pulse: 78   Resp: 16   Temp: 97.9 °F (36.6 °C)   TempSrc: Oral   SpO2: 94%   Weight: 81.1 kg (178 lb 12.8 oz)   Height: 182.9 cm (72.01\")   PainSc: 0-No pain       Patient's Body mass index is 24.24 kg/m². BMI is within normal parameters. No follow-up required.      Assessment/Plan   Patient Self-Management and Personalized Health Advice  The patient has been provided with information about: exercise and preventive services including:   · Exercise counseling provided.    Visit Diagnoses:  No diagnosis found.    No orders of the defined types were placed in this encounter.      Outpatient Encounter Prescriptions as of 5/17/2018   Medication Sig Dispense Refill   • pantoprazole (PROTONIX) 40 MG EC tablet TAKE ONE TABLET BY MOUTH DAILY 90 tablet 0   • pramipexole (MIRAPEX) 0.5 MG tablet TAKE ONE TABLET BY MOUTH TWICE A  tablet 0   • tamsulosin (FLOMAX) 0.4 MG capsule 24 hr capsule TAKE ONE CAPSULE BY MOUTH TWICE A  capsule 0   • [DISCONTINUED] promethazine (PHENERGAN) 25 MG suppository Insert 1 suppository into the rectum Every 6 (Six) Hours As Needed for Nausea or Vomiting. 10 suppository 0     No facility-administered encounter medications on file as of 5/17/2018.        Reviewed use of high risk medication in the elderly: yes  Reviewed for potential of harmful drug interactions in the elderly: yes    Follow Up:  No Follow-up on file.     An After Visit Summary and PPPS with all of these plans were given to the patient.         "

## 2018-05-17 NOTE — PATIENT INSTRUCTIONS
Hearing Loss  Hearing loss is a partial or total loss of the ability to hear. This can be temporary or permanent, and it can happen in one or both ears. Hearing loss may be referred to as deafness.  Medical care is necessary to treat hearing loss properly and to prevent the condition from getting worse. Your hearing may partially or completely come back, depending on what caused your hearing loss and how severe it is. In some cases, hearing loss is permanent.  What are the causes?  Common causes of hearing loss include:  · Too much wax in the ear canal.  · Infection of the ear canal or middle ear.  · Fluid in the middle ear.  · Injury to the ear or surrounding area.  · An object stuck in the ear.  · Prolonged exposure to loud sounds, such as music.  Less common causes of hearing loss include:  · Tumors in the ear.  · Viral or bacterial infections, such as meningitis.  · A hole in the eardrum (perforated eardrum).  · Problems with the hearing nerve that sends signals between the brain and the ear.  · Certain medicines.  What are the signs or symptoms?  Symptoms of this condition may include:  · Difficulty telling the difference between sounds.  · Difficulty following a conversation when there is background noise.  · Lack of response to sounds in your environment. This may be most noticeable when you do not respond to startling sounds.  · Needing to turn up the volume on the television, radio, etc.  · Ringing in the ears.  · Dizziness.  · Pain in the ears.  How is this diagnosed?  This condition is diagnosed based on a physical exam and a hearing test (audiometry). The audiometry test will be performed by a hearing specialist (audiologist). You may also be referred to an ear, nose, and throat (ENT) specialist (otolaryngologist).  How is this treated?  Treatment for recent onset of hearing loss may include:  · Ear wax removal.  · Being prescribed medicines to prevent infection (antibiotics).  · Being prescribed  medicines to reduce inflammation (corticosteroids).  Follow these instructions at home:  · If you were prescribed an antibiotic medicine, take it as told by your health care provider. Do not stop taking the antibiotic even if you start to feel better.  · Take over-the-counter and prescription medicines only as told by your health care provider.  · Avoid loud noises.  · Return to your normal activities as told by your health care provider. Ask your health care provider what activities are safe for you.  · Keep all follow-up visits as told by your health care provider. This is important.  Contact a health care provider if:  · You feel dizzy.  · You develop new symptoms.  · You vomit or feel nauseous.  · You have a fever.  Get help right away if:  · You develop sudden changes in your vision.  · You have severe ear pain.  · You have new or increased weakness.  · You have a severe headache.  This information is not intended to replace advice given to you by your health care provider. Make sure you discuss any questions you have with your health care provider.  Document Released: 12/18/2006 Document Revised: 05/25/2017 Document Reviewed: 05/04/2016  [employer] Interactive Patient Education © 2017 [employer]  Aspirin and Your Heart  Aspirin is a medicine that affects the way blood clots. Aspirin can be used to help reduce the risk of blood clots, heart attacks, and other heart-related problems.  Should I take aspirin?  Your health care provider will help you determine whether it is safe and beneficial for you to take aspirin daily. Taking aspirin daily may be beneficial if you:  · Have had a heart attack or chest pain.  · Have undergone open heart surgery such as coronary artery bypass surgery (CABG).  · Have had coronary angioplasty.  · Have experienced a stroke or transient ischemic attack (TIA).  · Have peripheral vascular disease (PVD).  · Have chronic heart rhythm problems such as atrial fibrillation.  Are there any  risks of taking aspirin daily?  Daily use of aspirin can increase your risk of side effects. Some of these include:  · Bleeding. Bleeding problems can be minor or serious. An example of a minor problem is a cut that does not stop bleeding. An example of a more serious problem is stomach bleeding or bleeding into the brain. Your risk of bleeding is increased if you are also taking non-steroidal anti-inflammatory medicine (NSAIDs).  · Increased bruising.  · Upset stomach.  · An allergic reaction. People who have nasal polyps have an increased risk of developing an aspirin allergy.  What are some guidelines I should follow when taking aspirin?  · Take aspirin only as directed by your health care provider. Make sure you understand how much you should take and what form you should take. The two forms of aspirin are:  ¨ Non-enteric-coated. This type of aspirin does not have a coating and is absorbed quickly. Non-enteric-coated aspirin is usually recommended for people with chest pain. This type of aspirin also comes in a chewable form.  ¨ Enteric-coated. This type of aspirin has a special coating that releases the medicine very slowly. Enteric-coated aspirin causes less stomach upset than non-enteric-coated aspirin. This type of aspirin should not be chewed or crushed.  · Drink alcohol in moderation. Drinking alcohol increases your risk of bleeding.  When should I seek medical care?  · You have unusual bleeding or bruising.  · You have stomach pain.  · You have an allergic reaction. Symptoms of an allergic reaction include:  ¨ Hives.  ¨ Itchy skin.  ¨ Swelling of the lips, tongue, or face.  · You have ringing in your ears.  When should I seek immediate medical care?  · Your bowel movements are bloody, dark red, or black in color.  · You vomit or cough up blood.  · You have blood in your urine.  · You cough, wheeze, or feel short of breath.  If you have any of the following symptoms, this is an emergency. Do not wait to  see if the pain will go away. Get medical help at once. Call your local emergency services (911 in the [place] Do not drive yourself to the hospital.  · You have severe chest pain, especially if the pain is crushing or pressure-like and spreads to the arms, back, neck, or jaw.  · You have stroke-like symptoms, such as:  ¨ Loss of vision.  ¨ Difficulty talking.  ¨ Numbness or weakness on one side of your body.  ¨ Numbness or weakness in your arm or leg.  ¨ Not thinking clearly or feeling confused.  This information is not intended to replace advice given to you by your health care provider. Make sure you discuss any questions you have with your health care provider.  Document Released: 11/30/2009 Document Revised: 04/26/2017 Document Reviewed: 03/25/2015  [employer] Interactive Patient Education © 2017 [employer]

## 2018-05-17 NOTE — PROGRESS NOTES
Subjective   Jim Romero is a 87 y.o. male.     Chief Complaint   Patient presents with   • Restless Legs Syndrome   • Fatigue   • Hyperglycemia     dry mouth x 2 weeks         Fatigue   This is a chronic problem. The current episode started more than 1 year ago. The problem occurs constantly. The problem has been unchanged. Associated symptoms include fatigue. Pertinent negatives include no abdominal pain, chest pain, chills or fever. Nothing aggravates the symptoms. He has tried nothing for the symptoms.   Hyperglycemia   This is a chronic problem. The current episode started more than 1 year ago. The problem occurs constantly. Associated symptoms include fatigue. Pertinent negatives include no abdominal pain, chest pain, chills or fever.        The following portions of the patient's history were reviewed and updated as appropriate: allergies, current medications, past social history and problem list.    Outpatient Prescriptions Marked as Taking for the 5/17/18 encounter (Office Visit) with Reji Gilman MD   Medication Sig Dispense Refill   • pantoprazole (PROTONIX) 40 MG EC tablet TAKE ONE TABLET BY MOUTH DAILY 90 tablet 0   • pramipexole (MIRAPEX) 0.5 MG tablet TAKE ONE TABLET BY MOUTH TWICE A  tablet 0   • tamsulosin (FLOMAX) 0.4 MG capsule 24 hr capsule TAKE ONE CAPSULE BY MOUTH TWICE A  capsule 0   • [DISCONTINUED] promethazine (PHENERGAN) 25 MG suppository Insert 1 suppository into the rectum Every 6 (Six) Hours As Needed for Nausea or Vomiting. 10 suppository 0       Review of Systems   Constitutional: Positive for fatigue. Negative for chills and fever.   Respiratory: Negative for shortness of breath and wheezing.    Cardiovascular: Negative for chest pain and palpitations.   Gastrointestinal: Negative for abdominal distention and abdominal pain.   Genitourinary: Positive for decreased urine volume.       Objective   Vitals:    05/17/18 1443   BP: 114/62   Pulse: 78   Resp: 16   Temp:  97.9 °F (36.6 °C)   SpO2: 94%      1    05/17/18  1443   Weight: 81.1 kg (178 lb 12.8 oz)    [unfilled]  Body mass index is 24.24 kg/m².      Physical Exam   Constitutional: He appears well-developed and well-nourished.   Cardiovascular: Normal rate, regular rhythm, normal heart sounds and intact distal pulses.  Exam reveals no friction rub.    No murmur heard.  Pulmonary/Chest: Effort normal and breath sounds normal. No respiratory distress. He has no wheezes. He has no rales.   Abdominal: Soft. Bowel sounds are normal. He exhibits no distension and no mass. There is no tenderness. There is no guarding.         Problem List Items Addressed This Visit        Digestive    GERD (gastroesophageal reflux disease)       Endocrine    IGT (impaired glucose tolerance) - Primary       Genitourinary    BPH (benign prostatic hyperplasia)       Other    RLS (restless legs syndrome)        Assessment/Plan   In today for recheck of his leg syndrome.  He said to take more and more Mirapex lately.  1 mg at bedtime qhs.  Occasionally 1.5 mg hs.  He's got some ache in the lateral thighs at bedtime.  He is voiding pretty well on Flomax 0.8 mg daily.  He'll be due for TD AP and Prevnar today.  Lab work will include CBC, CMP, A1c.  Follow-up one year.         .[email or web address]  Dragon disclaimer:   Much of this encounter note is an electronic transcription/translation of spoken language to printed text. The electronic translation of spoken language may permit erroneous, or at times, nonsensical words or phrases to be inadvertently transcribed; Although I have reviewed the note for such errors, some may still exist.

## 2018-05-18 RX ORDER — TAMSULOSIN HYDROCHLORIDE 0.4 MG/1
CAPSULE ORAL
Qty: 180 CAPSULE | Refills: 0 | Status: SHIPPED | OUTPATIENT
Start: 2018-05-18 | End: 2018-08-09 | Stop reason: SDUPTHER

## 2018-05-18 RX ORDER — PANTOPRAZOLE SODIUM 40 MG/1
TABLET, DELAYED RELEASE ORAL
Qty: 90 TABLET | Refills: 0 | Status: SHIPPED | OUTPATIENT
Start: 2018-05-18 | End: 2018-08-09 | Stop reason: SDUPTHER

## 2018-05-18 RX ORDER — PRAMIPEXOLE DIHYDROCHLORIDE 0.5 MG/1
TABLET ORAL
Qty: 180 TABLET | Refills: 0 | Status: SHIPPED | OUTPATIENT
Start: 2018-05-18 | End: 2018-08-09 | Stop reason: SDUPTHER

## 2018-06-25 ENCOUNTER — OFFICE VISIT (OUTPATIENT)
Dept: INTERNAL MEDICINE | Facility: CLINIC | Age: 83
End: 2018-06-25

## 2018-06-25 VITALS
SYSTOLIC BLOOD PRESSURE: 130 MMHG | TEMPERATURE: 99.3 F | WEIGHT: 177 LBS | BODY MASS INDEX: 23.98 KG/M2 | OXYGEN SATURATION: 97 % | DIASTOLIC BLOOD PRESSURE: 62 MMHG | HEIGHT: 72 IN | HEART RATE: 79 BPM | RESPIRATION RATE: 16 BRPM

## 2018-06-25 DIAGNOSIS — M79.10 MYALGIA: ICD-10-CM

## 2018-06-25 DIAGNOSIS — M19.90 ARTHRITIS: Primary | ICD-10-CM

## 2018-06-25 LAB
BASOPHILS # BLD AUTO: 0.02 10*3/MM3 (ref 0–0.2)
BASOPHILS NFR BLD AUTO: 0.2 % (ref 0–1.5)
CRP SERPL-MCNC: 5.28 MG/DL (ref 0–0.5)
EOSINOPHIL # BLD AUTO: 0.11 10*3/MM3 (ref 0–0.7)
EOSINOPHIL NFR BLD AUTO: 0.9 % (ref 0.3–6.2)
ERYTHROCYTE [DISTWIDTH] IN BLOOD BY AUTOMATED COUNT: 14.1 % (ref 11.5–14.5)
ERYTHROCYTE [SEDIMENTATION RATE] IN BLOOD BY WESTERGREN METHOD: 39 MM/HR (ref 0–20)
HCT VFR BLD AUTO: 38.9 % (ref 40.4–52.2)
HGB BLD-MCNC: 11.9 G/DL (ref 13.7–17.6)
IMM GRANULOCYTES # BLD: 0.02 10*3/MM3 (ref 0–0.03)
IMM GRANULOCYTES NFR BLD: 0.2 % (ref 0–0.5)
LYMPHOCYTES # BLD AUTO: 1.25 10*3/MM3 (ref 0.9–4.8)
LYMPHOCYTES NFR BLD AUTO: 10.8 % (ref 19.6–45.3)
MCH RBC QN AUTO: 26.9 PG (ref 27–32.7)
MCHC RBC AUTO-ENTMCNC: 30.6 G/DL (ref 32.6–36.4)
MCV RBC AUTO: 88 FL (ref 79.8–96.2)
MONOCYTES # BLD AUTO: 0.85 10*3/MM3 (ref 0.2–1.2)
MONOCYTES NFR BLD AUTO: 7.3 % (ref 5–12)
NEUTROPHILS # BLD AUTO: 9.35 10*3/MM3 (ref 1.9–8.1)
NEUTROPHILS NFR BLD AUTO: 80.6 % (ref 42.7–76)
PLATELET # BLD AUTO: 301 10*3/MM3 (ref 140–500)
RBC # BLD AUTO: 4.42 10*6/MM3 (ref 4.6–6)
TSH SERPL DL<=0.005 MIU/L-ACNC: 0.4 MIU/ML (ref 0.27–4.2)
WBC # BLD AUTO: 11.6 10*3/MM3 (ref 4.5–10.7)

## 2018-06-25 PROCEDURE — 99213 OFFICE O/P EST LOW 20 MIN: CPT | Performed by: INTERNAL MEDICINE

## 2018-06-25 RX ORDER — PREDNISONE 20 MG/1
20 TABLET ORAL DAILY
Qty: 14 TABLET | Refills: 0 | Status: SHIPPED | OUTPATIENT
Start: 2018-06-25 | End: 2018-07-09

## 2018-06-25 NOTE — PATIENT INSTRUCTIONS
Calorie Counting for Weight Loss  Calories are units of energy. Your body needs a certain amount of calories from food to keep you going throughout the day. When you eat more calories than your body needs, your body stores the extra calories as fat. When you eat fewer calories than your body needs, your body burns fat to get the energy it needs.  Calorie counting means keeping track of how many calories you eat and drink each day. Calorie counting can be helpful if you need to lose weight. If you make sure to eat fewer calories than your body needs, you should lose weight. Ask your health care provider what a healthy weight is for you.  For calorie counting to work, you will need to eat the right number of calories in a day in order to lose a healthy amount of weight per week. A dietitian can help you determine how many calories you need in a day and will give you suggestions on how to reach your calorie goal.  · A healthy amount of weight to lose per week is usually 1-2 lb (0.5-0.9 kg). This usually means that your daily calorie intake should be reduced by 500-750 calories.  · Eating 1,200 - 1,500 calories per day can help most women lose weight.  · Eating 1,500 - 1,800 calories per day can help most men lose weight.    What is my plan?  My goal is to have __________ calories per day.  If I have this many calories per day, I should lose around __________ pounds per week.  What do I need to know about calorie counting?  In order to meet your daily calorie goal, you will need to:  · Find out how many calories are in each food you would like to eat. Try to do this before you eat.  · Decide how much of the food you plan to eat.  · Write down what you ate and how many calories it had. Doing this is called keeping a food log.    To successfully lose weight, it is important to balance calorie counting with a healthy lifestyle that includes regular activity. Aim for 150 minutes of moderate exercise (such as walking) or 75  minutes of vigorous exercise (such as running) each week.  Where do I find calorie information?    The number of calories in a food can be found on a Nutrition Facts label. If a food does not have a Nutrition Facts label, try to look up the calories online or ask your dietitian for help.  Remember that calories are listed per serving. If you choose to have more than one serving of a food, you will have to multiply the calories per serving by the amount of servings you plan to eat. For example, the label on a package of bread might say that a serving size is 1 slice and that there are 90 calories in a serving. If you eat 1 slice, you will have eaten 90 calories. If you eat 2 slices, you will have eaten 180 calories.  How do I keep a food log?  Immediately after each meal, record the following information in your food log:  · What you ate. Don't forget to include toppings, sauces, and other extras on the food.  · How much you ate. This can be measured in cups, ounces, or number of items.  · How many calories each food and drink had.  · The total number of calories in the meal.    Keep your food log near you, such as in a small notebook in your pocket, or use a mobile paige or website. Some programs will calculate calories for you and show you how many calories you have left for the day to meet your goal.  What are some calorie counting tips?  · Use your calories on foods and drinks that will fill you up and not leave you hungry:  ? Some examples of foods that fill you up are nuts and nut butters, vegetables, lean proteins, and high-fiber foods like whole grains. High-fiber foods are foods with more than 5 g fiber per serving.  ? Drinks such as sodas, specialty coffee drinks, alcohol, and juices have a lot of calories, yet do not fill you up.  · Eat nutritious foods and avoid empty calories. Empty calories are calories you get from foods or beverages that do not have many vitamins or protein, such as candy, sweets, and  "soda. It is better to have a nutritious high-calorie food (such as an avocado) than a food with few nutrients (such as a bag of chips).  · Know how many calories are in the foods you eat most often. This will help you calculate calorie counts faster.  · Pay attention to calories in drinks. Low-calorie drinks include water and unsweetened drinks.  · Pay attention to nutrition labels for \"low fat\" or \"fat free\" foods. These foods sometimes have the same amount of calories or more calories than the full fat versions. They also often have added sugar, starch, or salt, to make up for flavor that was removed with the fat.  · Find a way of tracking calories that works for you. Get creative. Try different apps or programs if writing down calories does not work for you.  What are some portion control tips?  · Know how many calories are in a serving. This will help you know how many servings of a certain food you can have.  · Use a measuring cup to measure serving sizes. You could also try weighing out portions on a kitchen scale. With time, you will be able to estimate serving sizes for some foods.  · Take some time to put servings of different foods on your favorite plates, bowls, and cups so you know what a serving looks like.  · Try not to eat straight from a bag or box. Doing this can lead to overeating. Put the amount you would like to eat in a cup or on a plate to make sure you are eating the right portion.  · Use smaller plates, glasses, and bowls to prevent overeating.  · Try not to multitask (for example, watch TV or use your computer) while eating. If it is time to eat, sit down at a table and enjoy your food. This will help you to know when you are full. It will also help you to be aware of what you are eating and how much you are eating.  What are tips for following this plan?  Reading food labels  · Check the calorie count compared to the serving size. The serving size may be smaller than what you are used to " eating.  · Check the source of the calories. Make sure the food you are eating is high in vitamins and protein and low in saturated and trans fats.  Shopping  · Read nutrition labels while you shop. This will help you make healthy decisions before you decide to purchase your food.  · Make a grocery list and stick to it.  Cooking  · Try to cook your favorite foods in a healthier way. For example, try baking instead of frying.  · Use low-fat dairy products.  Meal planning  · Use more fruits and vegetables. Half of your plate should be fruits and vegetables.  · Include lean proteins like poultry and fish.  How do I count calories when eating out?  · Ask for smaller portion sizes.  · Consider sharing an entree and sides instead of getting your own entree.  · If you get your own entree, eat only half. Ask for a box at the beginning of your meal and put the rest of your entree in it so you are not tempted to eat it.  · If calories are listed on the menu, choose the lower calorie options.  · Choose dishes that include vegetables, fruits, whole grains, low-fat dairy products, and lean protein.  · Choose items that are boiled, broiled, grilled, or steamed. Stay away from items that are buttered, battered, fried, or served with cream sauce. Items labeled “crispy” are usually fried, unless stated otherwise.  · Choose water, low-fat milk, unsweetened iced tea, or other drinks without added sugar. If you want an alcoholic beverage, choose a lower calorie option such as a glass of wine or light beer.  · Ask for dressings, sauces, and syrups on the side. These are usually high in calories, so you should limit the amount you eat.  · If you want a salad, choose a garden salad and ask for grilled meats. Avoid extra toppings like escalante, cheese, or fried items. Ask for the dressing on the side, or ask for olive oil and vinegar or lemon to use as dressing.  · Estimate how many servings of a food you are given. For example, a serving of  cooked rice is ½ cup or about the size of half a baseball. Knowing serving sizes will help you be aware of how much food you are eating at restaurants. The list below tells you how big or small some common portion sizes are based on everyday objects:  ? 1 oz--4 stacked dice.  ? 3 oz--1 deck of cards.  ? 1 tsp--1 die.  ? 1 Tbsp--½ a ping-pong ball.  ? 2 Tbsp--1 ping-pong ball.  ? ½ cup--½ baseball.  ? 1 cup--1 baseball.  Summary  · Calorie counting means keeping track of how many calories you eat and drink each day. If you eat fewer calories than your body needs, you should lose weight.  · A healthy amount of weight to lose per week is usually 1-2 lb (0.5-0.9 kg). This usually means reducing your daily calorie intake by 500-750 calories.  · The number of calories in a food can be found on a Nutrition Facts label. If a food does not have a Nutrition Facts label, try to look up the calories online or ask your dietitian for help.  · Use your calories on foods and drinks that will fill you up, and not on foods and drinks that will leave you hungry.  · Use smaller plates, glasses, and bowls to prevent overeating.  This information is not intended to replace advice given to you by your health care provider. Make sure you discuss any questions you have with your health care provider.  Document Released: 12/18/2006 Document Revised: 11/17/2017 Document Reviewed: 11/17/2017  [employer] Interactive Patient Education © 2018 [employer]  Exercising to Lose Weight  Exercising can help you to lose weight. In order to lose weight through exercise, you need to do vigorous-intensity exercise. You can tell that you are exercising with vigorous intensity if you are breathing very hard and fast and cannot hold a conversation while exercising.  Moderate-intensity exercise helps to maintain your current weight. You can tell that you are exercising at a moderate level if you have a higher heart rate and faster breathing, but you are still  able to hold a conversation.  How often should I exercise?  Choose an activity that you enjoy and set realistic goals. Your health care provider can help you to make an activity plan that works for you. Exercise regularly as directed by your health care provider. This may include:  · Doing resistance training twice each week, such as:  ? Push-ups.  ? Sit-ups.  ? Lifting weights.  ? Using resistance bands.  · Doing a given intensity of exercise for a given amount of time. Choose from these options:  ? 150 minutes of moderate-intensity exercise every week.  ? 75 minutes of vigorous-intensity exercise every week.  ? A mix of moderate-intensity and vigorous-intensity exercise every week.    Children, pregnant women, people who are out of shape, people who are overweight, and older adults may need to consult a health care provider for individual recommendations. If you have any sort of medical condition, be sure to consult your health care provider before starting a new exercise program.  What are some activities that can help me to lose weight?  · Walking at a rate of at least 4.5 miles an hour.  · Jogging or running at a rate of 5 miles per hour.  · Biking at a rate of at least 10 miles per hour.  · Lap swimming.  · Roller-skating or in-line skating.  · Cross-country skiing.  · Vigorous competitive sports, such as football, basketball, and soccer.  · Jumping rope.  · Aerobic dancing.  How can I be more active in my day-to-day activities?  · Use the stairs instead of the elevator.  · Take a walk during your lunch break.  · If you drive, park your car farther away from work or school.  · If you take public transportation, get off one stop early and walk the rest of the way.  · Make all of your phone calls while standing up and walking around.  · Get up, stretch, and walk around every 30 minutes throughout the day.  What guidelines should I follow while exercising?  · Do not exercise so much that you hurt yourself, feel  dizzy, or get very short of breath.  · Consult your health care provider prior to starting a new exercise program.  · Wear comfortable clothes and shoes with good support.  · Drink plenty of water while you exercise to prevent dehydration or heat stroke. Body water is lost during exercise and must be replaced.  · Work out until you breathe faster and your heart beats faster.  This information is not intended to replace advice given to you by your health care provider. Make sure you discuss any questions you have with your health care provider.  Document Released: 01/20/2012 Document Revised: 05/25/2017 Document Reviewed: 05/21/2015  [employer] Interactive Patient Education © 2018 [employer]  BMI for Adults  Body mass index (BMI) is a number that is calculated from a person's weight and height. In most adults, the number is used to find how much of an adult's weight is made up of fat. BMI is not as accurate as a direct measure of body fat.  How is BMI calculated?  BMI is calculated by dividing weight in kilograms by height in meters squared. It can also be calculated by dividing weight in pounds by height in inches squared, then multiplying the resulting number by 703. Charts are available to help you find your BMI quickly and easily without doing this calculation.  How is BMI interpreted?  Health care professionals use BMI charts to identify whether an adult is underweight, at a normal weight, or overweight based on the following guidelines:  · Underweight: BMI less than 18.5.  · Normal weight: BMI between 18.5 and 24.9.  · Overweight: BMI between 25 and 29.9.  · Obese: BMI of 30 and above.    BMI is usually interpreted the same for males and females.  Weight includes both fat and muscle, so someone with a muscular build, such as an athlete, may have a BMI that is higher than 24.9. In cases like these, BMI may not accurately depict body fat. To determine if excess body fat is the cause of a BMI of 25 or higher,  further assessments may need to be done by a health care provider.  Why is BMI a useful tool?  BMI is used to identify a possible weight problem that may be related to a medical problem or may increase the risk for medical problems. BMI can also be used to promote changes to reach a healthy weight.  This information is not intended to replace advice given to you by your health care provider. Make sure you discuss any questions you have with your health care provider.  Document Released: 08/29/2005 Document Revised: 04/27/2017 Document Reviewed: 05/15/2015  [employer] Interactive Patient Education © 2018 [employer]

## 2018-06-25 NOTE — PROGRESS NOTES
Subjective   Jim Romero is a 87 y.o. male.     Chief Complaint   Patient presents with   • Joint Swelling     joint pain/ restricted movement x 2 wks   • Fatigue         Arthritis   Presents for initial visit. The disease course has been worsening. He complains of pain and stiffness. He reports no joint warmth. Affected locations include the right knee, left knee, left DIP and right hip. Pertinent negatives include no diarrhea, dysuria, fatigue or fever. There is no history of chronic back pain, osteoarthritis or rheumatoid arthritis.   Past treatments include NSAIDs.        The following portions of the patient's history were reviewed and updated as appropriate: allergies, current medications, past social history and problem list.    Outpatient Prescriptions Marked as Taking for the 6/25/18 encounter (Office Visit) with Reji Gilman MD   Medication Sig Dispense Refill   • pantoprazole (PROTONIX) 40 MG EC tablet TAKE ONE TABLET BY MOUTH DAILY (Patient taking differently: Taking 40mg tablet every other day.) 90 tablet 0   • pramipexole (MIRAPEX) 0.5 MG tablet TAKE ONE TABLET BY MOUTH TWICE A  tablet 0   • tamsulosin (FLOMAX) 0.4 MG capsule 24 hr capsule TAKE ONE CAPSULE BY MOUTH TWICE A  capsule 0       Review of Systems   Constitutional: Negative for chills, fatigue and fever.   Gastrointestinal: Negative for diarrhea.   Genitourinary: Negative for dysuria.   Musculoskeletal: Positive for arthritis, back pain, myalgias and stiffness.       Objective   Vitals:    06/25/18 0929   BP: 130/62   Pulse: 79   Resp: 16   Temp: 99.3 °F (37.4 °C)   SpO2: 97%      1    06/25/18  0929   Weight: 80.3 kg (177 lb)    [unfilled]  Body mass index is 24 kg/m².      Physical Exam   Constitutional: He appears well-developed and well-nourished.   Musculoskeletal: Normal range of motion. He exhibits no edema, tenderness or deformity.   Skin: Skin is warm and dry.         Problem List Items Addressed This Visit      None      Visit Diagnoses     Arthritis    -  Primary    Myalgia            Assessment/Plan   In today with a 2 week illness.  In the lower back, hips, knees, thighs.  He has trouble getting up and down from a seated position.  Trouble walking and bending over.  He states he is stiff and achy.  Seems to have some muscle weakness.  No jaw claudication.  Some minor symptoms in the shoulder girdle.  He's never had arthritic complaints before.  Symptoms are certainly atypical.  None of his medication should be a problem.  Polymyalgia rheumatica would be the primary concern.  We'll check some lab work today including CBC, TSH, sedimentation rate, CRP.  Begin on prednisone 20 mg per day.  Recheck in 2 weeks.  His joint exam today is normal.  Temporal arteries are normal.           .lucafollowup  Dragon disclaimer:   Much of this encounter note is an electronic transcription/translation of spoken language to printed text. The electronic translation of spoken language may permit erroneous, or at times, nonsensical words or phrases to be inadvertently transcribed; Although I have reviewed the note for such errors, some may still exist.

## 2018-07-09 ENCOUNTER — OFFICE VISIT (OUTPATIENT)
Dept: INTERNAL MEDICINE | Facility: CLINIC | Age: 83
End: 2018-07-09

## 2018-07-09 VITALS
TEMPERATURE: 98.8 F | SYSTOLIC BLOOD PRESSURE: 124 MMHG | RESPIRATION RATE: 16 BRPM | BODY MASS INDEX: 24.03 KG/M2 | WEIGHT: 177.4 LBS | DIASTOLIC BLOOD PRESSURE: 60 MMHG | HEART RATE: 79 BPM | OXYGEN SATURATION: 97 % | HEIGHT: 72 IN

## 2018-07-09 DIAGNOSIS — M35.3 PMR (POLYMYALGIA RHEUMATICA) (HCC): Primary | ICD-10-CM

## 2018-07-09 DIAGNOSIS — T38.0X5S: ICD-10-CM

## 2018-07-09 LAB
CRP SERPL-MCNC: 1.33 MG/DL (ref 0–0.5)
ERYTHROCYTE [SEDIMENTATION RATE] IN BLOOD BY WESTERGREN METHOD: 14 MM/HR (ref 0–20)

## 2018-07-09 PROCEDURE — 99213 OFFICE O/P EST LOW 20 MIN: CPT | Performed by: INTERNAL MEDICINE

## 2018-07-09 RX ORDER — PREDNISONE 20 MG/1
20 TABLET ORAL DAILY
Qty: 30 TABLET | Refills: 1 | Status: SHIPPED | OUTPATIENT
Start: 2018-07-09 | End: 2018-08-07 | Stop reason: DRUGHIGH

## 2018-07-09 NOTE — PROGRESS NOTES
Subjective   Jim Romero is a 87 y.o. male.     Chief Complaint   Patient presents with   • Arthritis         Arthritis   Presents for follow-up visit. The disease course has been worsening. He complains of pain and stiffness. He reports no joint warmth. The symptoms have been stable. Affected locations include the right knee, left knee, left DIP and right hip. Pertinent negatives include no diarrhea, dysuria, fatigue or fever. There is no history of chronic back pain, osteoarthritis or rheumatoid arthritis.   Past treatments include NSAIDs.        The following portions of the patient's history were reviewed and updated as appropriate: allergies, current medications, past social history and problem list.    Outpatient Prescriptions Marked as Taking for the 7/9/18 encounter (Office Visit) with Reji Gilman MD   Medication Sig Dispense Refill   • pantoprazole (PROTONIX) 40 MG EC tablet TAKE ONE TABLET BY MOUTH DAILY (Patient taking differently: Taking 40mg tablet every other day.) 90 tablet 0   • pramipexole (MIRAPEX) 0.5 MG tablet TAKE ONE TABLET BY MOUTH TWICE A  tablet 0   • tamsulosin (FLOMAX) 0.4 MG capsule 24 hr capsule TAKE ONE CAPSULE BY MOUTH TWICE A  capsule 0       Review of Systems   Constitutional: Negative for chills, fatigue and fever.   Gastrointestinal: Negative for diarrhea.   Genitourinary: Negative for dysuria.   Musculoskeletal: Positive for arthritis, back pain, myalgias and stiffness.       Objective   Vitals:    07/09/18 1323   BP: 124/60   Pulse: 79   Resp: 16   Temp: 98.8 °F (37.1 °C)   SpO2: 97%      1    07/09/18  1323   Weight: 80.5 kg (177 lb 6.4 oz)    [unfilled]  Body mass index is 24.05 kg/m².      Physical Exam   Constitutional: He appears well-developed and well-nourished.   Musculoskeletal: Normal range of motion. He exhibits no edema, tenderness or deformity.   Skin: Skin is warm and dry.         Problem List Items Addressed This Visit        Other    PMR  (polymyalgia rheumatica) (CMS/[facility]) - Primary      Other Visit Diagnoses     Steroid toxicity, sequela        Relevant Orders    DEXA Bone Density Axial        Assessment/Plan   In today with a 4 week illness.  In the lower back, hips, knees, thighs.  He has trouble getting up and down from a seated position.  Trouble walking and bending over.  He states he is stiff and achy.  Seems to have some muscle weakness.  No jaw claudication.  Some minor symptoms in the shoulder girdle.  He's never had arthritic complaints before.  Symptoms are certainly atypical.  Polymyalgia rheumatica would be the primary concern.  He's been on prednisone 20 mg daily for 2 weeks and felt fantastic on it.  Missed one dose and started feeling bad again.  Took 10 mg from his son and it did not help as much is 20.  CRP was elevated.  Sedimentation rate was only minimally elevated.  That's really a pretty good diagnosis for him.  I discussed getting a second opinion with rheumatology but he is not interested.  We'll start back on 20 mg daily and plan a very slow taper over 18 months.  Check a DEXA scan.  Begin Prolia.  Discussed various side effects of steroids including cataracts, diabetes, osteoporosis, sleep disturbance.           .bmifollowup  Dragon disclaimer:   Much of this encounter note is an electronic transcription/translation of spoken language to printed text. The electronic translation of spoken language may permit erroneous, or at times, nonsensical words or phrases to be inadvertently transcribed; Although I have reviewed the note for such errors, some may still exist.

## 2018-07-23 ENCOUNTER — HOSPITAL ENCOUNTER (OUTPATIENT)
Dept: BONE DENSITY | Facility: HOSPITAL | Age: 83
Discharge: HOME | End: 2018-07-23
Attending: INTERNAL MEDICINE | Admitting: INTERNAL MEDICINE

## 2018-07-23 PROCEDURE — 77080 DXA BONE DENSITY AXIAL: CPT

## 2018-07-25 DIAGNOSIS — M81.0 SENILE OSTEOPOROSIS: Primary | ICD-10-CM

## 2018-08-06 ENCOUNTER — OFFICE VISIT (OUTPATIENT)
Dept: INTERNAL MEDICINE | Facility: CLINIC | Age: 83
End: 2018-08-06

## 2018-08-06 VITALS
HEIGHT: 72 IN | WEIGHT: 182.8 LBS | HEART RATE: 80 BPM | SYSTOLIC BLOOD PRESSURE: 116 MMHG | OXYGEN SATURATION: 97 % | BODY MASS INDEX: 24.76 KG/M2 | DIASTOLIC BLOOD PRESSURE: 58 MMHG | RESPIRATION RATE: 16 BRPM | TEMPERATURE: 98.7 F

## 2018-08-06 DIAGNOSIS — R73.02 IGT (IMPAIRED GLUCOSE TOLERANCE): ICD-10-CM

## 2018-08-06 DIAGNOSIS — R53.82 CHRONIC FATIGUE: ICD-10-CM

## 2018-08-06 DIAGNOSIS — Z79.899 MEDICATION MANAGEMENT: ICD-10-CM

## 2018-08-06 DIAGNOSIS — R89.9 ABNORMAL LABORATORY TEST: ICD-10-CM

## 2018-08-06 DIAGNOSIS — M19.90 ARTHRITIS: ICD-10-CM

## 2018-08-06 DIAGNOSIS — M35.3 PMR (POLYMYALGIA RHEUMATICA) (HCC): Primary | ICD-10-CM

## 2018-08-06 DIAGNOSIS — M79.10 MYALGIA: ICD-10-CM

## 2018-08-06 LAB
ALBUMIN SERPL-MCNC: 3.6 G/DL (ref 3.5–5.2)
ALBUMIN/GLOB SERPL: 1.5 G/DL
ALP SERPL-CCNC: 37 U/L (ref 39–117)
ALT SERPL-CCNC: 18 U/L (ref 1–41)
AST SERPL-CCNC: 14 U/L (ref 1–40)
BASOPHILS # BLD AUTO: 0.01 10*3/MM3 (ref 0–0.2)
BASOPHILS NFR BLD AUTO: 0.1 % (ref 0–1.5)
BILIRUB SERPL-MCNC: 0.4 MG/DL (ref 0.1–1.2)
BUN SERPL-MCNC: 31 MG/DL (ref 8–23)
BUN/CREAT SERPL: 27.2 (ref 7–25)
CALCIUM SERPL-MCNC: 9.2 MG/DL (ref 8.6–10.5)
CHLORIDE SERPL-SCNC: 99 MMOL/L (ref 98–107)
CO2 SERPL-SCNC: 24.3 MMOL/L (ref 22–29)
CREAT SERPL-MCNC: 1.14 MG/DL (ref 0.76–1.27)
CRP SERPL-MCNC: 1.8 MG/DL (ref 0–0.5)
EOSINOPHIL # BLD AUTO: 0.01 10*3/MM3 (ref 0–0.7)
EOSINOPHIL NFR BLD AUTO: 0.1 % (ref 0.3–6.2)
ERYTHROCYTE [DISTWIDTH] IN BLOOD BY AUTOMATED COUNT: 15.8 % (ref 11.5–14.5)
ERYTHROCYTE [SEDIMENTATION RATE] IN BLOOD BY WESTERGREN METHOD: 22 MM/HR (ref 0–20)
GLOBULIN SER CALC-MCNC: 2.4 GM/DL
GLUCOSE SERPL-MCNC: 188 MG/DL (ref 65–99)
HCT VFR BLD AUTO: 36.1 % (ref 40.4–52.2)
HGB BLD-MCNC: 11.2 G/DL (ref 13.7–17.6)
IMM GRANULOCYTES # BLD: 0.09 10*3/MM3 (ref 0–0.03)
IMM GRANULOCYTES NFR BLD: 0.7 % (ref 0–0.5)
LYMPHOCYTES # BLD AUTO: 0.9 10*3/MM3 (ref 0.9–4.8)
LYMPHOCYTES NFR BLD AUTO: 6.7 % (ref 19.6–45.3)
MCH RBC QN AUTO: 26 PG (ref 27–32.7)
MCHC RBC AUTO-ENTMCNC: 31 G/DL (ref 32.6–36.4)
MCV RBC AUTO: 83.8 FL (ref 79.8–96.2)
MONOCYTES # BLD AUTO: 0.31 10*3/MM3 (ref 0.2–1.2)
MONOCYTES NFR BLD AUTO: 2.3 % (ref 5–12)
NEUTROPHILS # BLD AUTO: 12.13 10*3/MM3 (ref 1.9–8.1)
NEUTROPHILS NFR BLD AUTO: 90.1 % (ref 42.7–76)
PLATELET # BLD AUTO: 284 10*3/MM3 (ref 140–500)
POTASSIUM SERPL-SCNC: 4.9 MMOL/L (ref 3.5–5.2)
PROT SERPL-MCNC: 6 G/DL (ref 6–8.5)
RBC # BLD AUTO: 4.31 10*6/MM3 (ref 4.6–6)
SODIUM SERPL-SCNC: 136 MMOL/L (ref 136–145)
WBC # BLD AUTO: 13.45 10*3/MM3 (ref 4.5–10.7)

## 2018-08-06 PROCEDURE — 99213 OFFICE O/P EST LOW 20 MIN: CPT | Performed by: INTERNAL MEDICINE

## 2018-08-06 NOTE — PROGRESS NOTES
Subjective   Jim Romero is a 87 y.o. male.     Chief Complaint   Patient presents with   • Arthritis     PMR         Arthritis   Presents for follow-up visit. The symptoms have been stable. Affected locations include the right knee, left knee, left DIP and right hip. Pertinent negatives include no diarrhea, dysuria, fatigue or fever.        The following portions of the patient's history were reviewed and updated as appropriate: allergies, current medications, past social history and problem list.    Outpatient Prescriptions Marked as Taking for the 8/6/18 encounter (Office Visit) with Reji Gilman MD   Medication Sig Dispense Refill   • pantoprazole (PROTONIX) 40 MG EC tablet TAKE ONE TABLET BY MOUTH DAILY (Patient taking differently: Taking 40mg tablet every other day.) 90 tablet 0   • pramipexole (MIRAPEX) 0.5 MG tablet TAKE ONE TABLET BY MOUTH TWICE A  tablet 0   • predniSONE (DELTASONE) 20 MG tablet Take 1 tablet by mouth Daily. 30 tablet 1   • tamsulosin (FLOMAX) 0.4 MG capsule 24 hr capsule TAKE ONE CAPSULE BY MOUTH TWICE A  capsule 0       Review of Systems   Constitutional: Negative for chills, fatigue and fever.   Gastrointestinal: Negative for diarrhea.   Genitourinary: Negative for dysuria.   Musculoskeletal: Positive for arthritis, back pain and myalgias.       Objective   Vitals:    08/06/18 1410   BP: 116/58   Pulse: 80   Resp: 16   Temp: 98.7 °F (37.1 °C)   SpO2: 97%      1    08/06/18  1410   Weight: 82.9 kg (182 lb 12.8 oz)    [unfilled]  Body mass index is 24.79 kg/m².      Physical Exam   Constitutional: He appears well-developed and well-nourished.   Musculoskeletal: Normal range of motion. He exhibits no edema, tenderness or deformity.   Skin: Skin is warm and dry.         Problem List Items Addressed This Visit        Endocrine    IGT (impaired glucose tolerance)       Other    PMR (polymyalgia rheumatica) (CMS/[facility]) - Primary        Assessment/Plan   In today with a 8  week illness.  In the lower back, hips, knees, thighs.  He has trouble getting up and down from a seated position.  Trouble walking and bending over.  He states he is stiff and achy.  Seems to have some muscle weakness.  No jaw claudication.  Some minor symptoms in the shoulder girdle.  He's never had arthritic complaints before.  Symptoms are certainly atypical.  Polymyalgia rheumatica would be the primary concern.  He's been on prednisone 20 mg daily for 6 weeks and felt fantastic on it.  Missed one dose and started feeling bad again.  CRP was elevated.  Sedimentation rate was only minimally elevated.  That's really a pretty good diagnosis for him.  I discussed getting a second opinion with rheumatology but he is not interested.  We'll cut back on 20 mg daily and plan a very slow taper over 18 months.  Begin Prolia.  Has not started yet.  Due for annual lab work today including CBC and CMP.           .bmifollowup  Dragon disclaimer:   Much of this encounter note is an electronic transcription/translation of spoken language to printed text. The electronic translation of spoken language may permit erroneous, or at times, nonsensical words or phrases to be inadvertently transcribed; Although I have reviewed the note for such errors, some may still exist.

## 2018-08-07 PROBLEM — M81.0 OSTEOPOROSIS: Status: ACTIVE | Noted: 2018-08-07

## 2018-08-07 RX ORDER — PREDNISONE 10 MG/1
10 TABLET ORAL DAILY
Qty: 30 TABLET | Refills: 2 | OUTPATIENT
Start: 2018-08-07 | End: 2018-11-26

## 2018-08-07 RX ORDER — PREDNISONE 1 MG/1
7.5 TABLET ORAL DAILY
Qty: 45 TABLET | Refills: 2 | Status: SHIPPED | OUTPATIENT
Start: 2018-08-07 | End: 2018-11-26

## 2018-08-09 RX ORDER — TAMSULOSIN HYDROCHLORIDE 0.4 MG/1
CAPSULE ORAL
Qty: 180 CAPSULE | Refills: 0 | Status: SHIPPED | OUTPATIENT
Start: 2018-08-09 | End: 2018-11-11 | Stop reason: SDUPTHER

## 2018-08-09 RX ORDER — PANTOPRAZOLE SODIUM 40 MG/1
TABLET, DELAYED RELEASE ORAL
Qty: 90 TABLET | Refills: 0 | Status: SHIPPED | OUTPATIENT
Start: 2018-08-09 | End: 2019-02-24 | Stop reason: SDUPTHER

## 2018-08-09 RX ORDER — PRAMIPEXOLE DIHYDROCHLORIDE 0.5 MG/1
TABLET ORAL
Qty: 180 TABLET | Refills: 0 | Status: SHIPPED | OUTPATIENT
Start: 2018-08-09 | End: 2018-11-11 | Stop reason: SDUPTHER

## 2018-08-13 ENCOUNTER — TELEPHONE (OUTPATIENT)
Dept: INTERNAL MEDICINE | Facility: CLINIC | Age: 83
End: 2018-08-13

## 2018-08-13 ENCOUNTER — INFUSION (OUTPATIENT)
Dept: ONCOLOGY | Facility: HOSPITAL | Age: 83
End: 2018-08-13

## 2018-08-13 VITALS — TEMPERATURE: 98 F | WEIGHT: 181 LBS | BODY MASS INDEX: 24.54 KG/M2

## 2018-08-13 DIAGNOSIS — M81.0 OSTEOPOROSIS, UNSPECIFIED OSTEOPOROSIS TYPE, UNSPECIFIED PATHOLOGICAL FRACTURE PRESENCE: Primary | ICD-10-CM

## 2018-08-13 PROCEDURE — 25010000002 DENOSUMAB 60 MG/ML SOLUTION: Performed by: INTERNAL MEDICINE

## 2018-08-13 PROCEDURE — 96372 THER/PROPH/DIAG INJ SC/IM: CPT | Performed by: NURSE PRACTITIONER

## 2018-08-13 RX ADMIN — DENOSUMAB 60 MG: 60 INJECTION SUBCUTANEOUS at 15:10

## 2018-08-13 NOTE — PROGRESS NOTES
Pt arrived for 1st prolia injection ambulatory and by himself.  Pt given verbal and written instructions on the medication and side effects reveiwed.  Med list reviewed but pt is unsure if he is supposed to be on a calcium supplement.  Pt instructed to contact MD to confirm. He was unaware of a new medication that was recently added.  Pt given instructions to schedule next appt and to contact his MD with any concerns.  He v.u. All instructions.

## 2018-08-13 NOTE — TELEPHONE ENCOUNTER
The patient was told when he got his prolia shot that he should be taking calcium and vitamin D supplements. Please advise.    Yes.  Begin Caltrate D twice a day.  That has vitamin D in it.

## 2018-09-07 ENCOUNTER — TELEPHONE (OUTPATIENT)
Dept: INTERNAL MEDICINE | Facility: CLINIC | Age: 83
End: 2018-09-07

## 2018-09-07 RX ORDER — PREDNISONE 20 MG/1
TABLET ORAL
Qty: 30 TABLET | Refills: 0 | Status: SHIPPED | OUTPATIENT
Start: 2018-09-07 | End: 2018-11-26

## 2018-09-07 NOTE — TELEPHONE ENCOUNTER
Patient stated that you had told him to be taking 17 mg daily of prednisone. He has been taking 15 mg daily because it is easier to cut his tablets down to get 15 mg than 17 mg. He asked if you would approve him to continue taking the 15 mg daily and requested a Rx of 15 mg tablets. Please advise.    He needs to do precisely what we tell him.  The dose was 17.5 mg.  That would be 3-1/2 of the 5 mg tablets.  When we get down to 12.5 mg it will be the same problem and issue as well as the 7.5 mg dose so he needs to get used to cutting these in half.  If he cannot cut them, we will have to do something like give him 2 mg tablets so he can take 3 of the fives and one in the twos.  That gets a little complicated that is doable.

## 2018-09-17 ENCOUNTER — OFFICE VISIT (OUTPATIENT)
Dept: INTERNAL MEDICINE | Facility: CLINIC | Age: 83
End: 2018-09-17

## 2018-09-17 VITALS
HEIGHT: 72 IN | HEART RATE: 94 BPM | BODY MASS INDEX: 24.65 KG/M2 | SYSTOLIC BLOOD PRESSURE: 102 MMHG | OXYGEN SATURATION: 95 % | TEMPERATURE: 98.7 F | DIASTOLIC BLOOD PRESSURE: 48 MMHG | WEIGHT: 182 LBS | RESPIRATION RATE: 16 BRPM

## 2018-09-17 DIAGNOSIS — M35.3 PMR (POLYMYALGIA RHEUMATICA) (HCC): ICD-10-CM

## 2018-09-17 DIAGNOSIS — Z23 NEED FOR IMMUNIZATION AGAINST INFLUENZA: Primary | ICD-10-CM

## 2018-09-17 DIAGNOSIS — R73.02 IGT (IMPAIRED GLUCOSE TOLERANCE): ICD-10-CM

## 2018-09-17 DIAGNOSIS — K21.9 GASTROESOPHAGEAL REFLUX DISEASE, ESOPHAGITIS PRESENCE NOT SPECIFIED: ICD-10-CM

## 2018-09-17 DIAGNOSIS — M81.0 OSTEOPOROSIS, UNSPECIFIED OSTEOPOROSIS TYPE, UNSPECIFIED PATHOLOGICAL FRACTURE PRESENCE: ICD-10-CM

## 2018-09-17 LAB
CRP SERPL-MCNC: 0.34 MG/DL (ref 0–0.5)
ERYTHROCYTE [SEDIMENTATION RATE] IN BLOOD BY WESTERGREN METHOD: 10 MM/HR (ref 0–20)
GLUCOSE P FAST SERPL-MCNC: 229 MG/DL (ref 74–106)
HBA1C MFR BLD: 6.8 % (ref 4.8–5.6)

## 2018-09-17 PROCEDURE — 90662 IIV NO PRSV INCREASED AG IM: CPT | Performed by: INTERNAL MEDICINE

## 2018-09-17 PROCEDURE — G0008 ADMIN INFLUENZA VIRUS VAC: HCPCS | Performed by: INTERNAL MEDICINE

## 2018-09-17 PROCEDURE — 99213 OFFICE O/P EST LOW 20 MIN: CPT | Performed by: INTERNAL MEDICINE

## 2018-09-17 RX ORDER — PREDNISONE 2.5 MG
2.5 TABLET ORAL DAILY
Qty: 60 TABLET | Refills: 1 | Status: SHIPPED | OUTPATIENT
Start: 2018-09-17 | End: 2018-11-26

## 2018-09-17 NOTE — PATIENT INSTRUCTIONS
Influenza Virus Vaccine injection  What is this medicine?  INFLUENZA VIRUS VACCINE (in [facility]) helps to reduce the risk of getting influenza also known as the flu. The vaccine only helps protect you against some strains of the flu.  This medicine may be used for other purposes; ask your health care provider or pharmacist if you have questions.  COMMON BRAND NAME(S): Afluria, Agriflu, Alfuria, FLUAD, Fluarix, Fluarix Quadrivalent, Flublok, Flublok Quadrivalent, FLUCELVAX, Flulaval, Fluvirin, Fluzone, Fluzone High-Dose, Fluzone Intradermal  What should I tell my health care provider before I take this medicine?  They need to know if you have any of these conditions:  -bleeding disorder like hemophilia  -fever or infection  -Guillain-[place] syndrome or other neurological problems  -immune system problems  -infection with the human immunodeficiency virus (HIV) or AIDS  -low blood platelet counts  -multiple sclerosis  -an unusual or allergic reaction to influenza virus vaccine, latex, other medicines, foods, dyes, or preservatives. Different brands of vaccines contain different allergens. Some may contain latex or eggs. Talk to your doctor about your allergies to make sure that you get the right vaccine.  -pregnant or trying to get pregnant  -breast-feeding  How should I use this medicine?  This vaccine is for injection into a muscle or under the skin. It is given by a health care professional.  A copy of Vaccine Information Statements will be given before each vaccination. Read this sheet carefully each time. The sheet may change frequently.  Talk to your healthcare provider to see which vaccines are right for you. Some vaccines should not be used in all age groups.  Overdosage: If you think you have taken too much of this medicine contact a poison control center or emergency room at once.  NOTE: This medicine is only for you. Do not share this medicine with others.  What if I miss a dose?  This  does not apply.  What may interact with this medicine?  -chemotherapy or radiation therapy  -medicines that lower your immune system like etanercept, anakinra, infliximab, and adalimumab  -medicines that treat or prevent blood clots like warfarin  -phenytoin  -steroid medicines like prednisone or cortisone  -theophylline  -vaccines  This list may not describe all possible interactions. Give your health care provider a list of all the medicines, herbs, non-prescription drugs, or dietary supplements you use. Also tell them if you smoke, drink alcohol, or use illegal drugs. Some items may interact with your medicine.  What should I watch for while using this medicine?  Report any side effects that do not go away within 3 days to your doctor or health care professional. Call your health care provider if any unusual symptoms occur within 6 weeks of receiving this vaccine.  You may still catch the flu, but the illness is not usually as bad. You cannot get the flu from the vaccine. The vaccine will not protect against colds or other illnesses that may cause fever. The vaccine is needed every year.  What side effects may I notice from receiving this medicine?  Side effects that you should report to your doctor or health care professional as soon as possible:  -allergic reactions like skin rash, itching or hives, swelling of the face, lips, or tongue  Side effects that usually do not require medical attention (report to your doctor or health care professional if they continue or are bothersome):  -fever  -headache  -muscle aches and pains  -pain, tenderness, redness, or swelling at the injection site  -tiredness  This list may not describe all possible side effects. Call your doctor for medical advice about side effects. You may report side effects to FDA at 7-789-FDA-5931.  Where should I keep my medicine?  The vaccine will be given by a health care professional in a clinic, pharmacy, doctor's office, or other health care  setting. You will not be given vaccine doses to store at home.  NOTE: This sheet is a summary. It may not cover all possible information. If you have questions about this medicine, talk to your doctor, pharmacist, or health care provider.  © 2018 [employer] (2016-07-08 10:07:28)

## 2018-09-17 NOTE — PROGRESS NOTES
Subjective   Jim Romero is a 87 y.o. male.     Chief Complaint   Patient presents with   • Neck Pain     PMR         Arthritis   Presents for follow-up visit. The symptoms have been stable. Affected locations include the right knee, left knee, left DIP and right hip. Pertinent negatives include no diarrhea, dysuria or fatigue.        The following portions of the patient's history were reviewed and updated as appropriate: allergies, current medications, past social history and problem list.    Outpatient Prescriptions Marked as Taking for the 9/17/18 encounter (Office Visit) with Reji Gilman MD   Medication Sig Dispense Refill   • metFORMIN (GLUCOPHAGE) 500 MG tablet Take 1 tablet by mouth Daily With Breakfast. 90 tablet 1   • pantoprazole (PROTONIX) 40 MG EC tablet TAKE ONE TABLET BY MOUTH DAILY 90 tablet 0   • pramipexole (MIRAPEX) 0.5 MG tablet TAKE ONE TABLET BY MOUTH TWICE A  tablet 0   • predniSONE (DELTASONE) 10 MG tablet Take 1 tablet by mouth Daily. 30 tablet 2   • predniSONE (DELTASONE) 20 MG tablet TAKE ONE TABLET BY MOUTH DAILY 30 tablet 0   • predniSONE (DELTASONE) 5 MG tablet Take 1.5 tablets by mouth Daily. 45 tablet 2   • tamsulosin (FLOMAX) 0.4 MG capsule 24 hr capsule TAKE ONE CAPSULE BY MOUTH TWICE A  capsule 0       Review of Systems   Constitutional: Negative for chills and fatigue.   Gastrointestinal: Negative for diarrhea.   Genitourinary: Negative for dysuria.   Musculoskeletal: Positive for arthritis, back pain and myalgias.       Objective   Vitals:    09/17/18 1405   BP: 102/48   Pulse: 94   Resp: 16   Temp: 98.7 °F (37.1 °C)   SpO2: 95%      1    09/17/18  1405   Weight: 82.6 kg (182 lb)    [unfilled]  Body mass index is 24.68 kg/m².      Physical Exam   Constitutional: He appears well-developed and well-nourished.   Musculoskeletal: Normal range of motion. He exhibits no edema, tenderness or deformity.   Skin: Skin is warm and dry.         Problem List Items  Addressed This Visit        Digestive    GERD (gastroesophageal reflux disease)       Endocrine    IGT (impaired glucose tolerance)    Relevant Orders    Glucose, Plasma ([facility])    Hemoglobin A1c       Musculoskeletal and Integument    Osteoporosis       Other    PMR (polymyalgia rheumatica) (CMS/[facility])    Relevant Orders    C-reactive Protein    Sedimentation Rate      Other Visit Diagnoses     Need for immunization against influenza    -  Primary    Relevant Orders    Fluzone High Dose =>65Years (Completed)        Assessment/Plan   In today with a 12 week illness.  In the lower back, hips, knees, thighs.  He has trouble getting up and down from a seated position.  Trouble walking and bending over.  He states he is stiff and achy.  No jaw claudication.  Some minor symptoms in the shoulder girdle.  He's never had arthritic complaints before.  Symptoms are certainly atypical.  Polymyalgia rheumatica would be the primary concern.  He's been on prednisone 20 mg daily for 6 weeks and felt fantastic on it.  Missed one dose and started feeling bad again.  CRP was elevated.  Sedimentation rate was only minimally elevated.  On 15 mg daily for the past 3 weeks.  That's really a pretty good diagnosis for him.  I discussed getting a second opinion with rheumatology but he is not interested.  Has started on Prolia now.  Added caltrate D.  On metformin now.  8, CRP, glucose, A1c today.  We'll leave dose of prednisone at 15 mg daily for now and cut down to 12.5 mg in about one month.  Add protonix 40 mg twice a day for recent GI upset.  Follow-up in one month.            .bmifollowup  Dragon disclaimer:   Much of this encounter note is an electronic transcription/translation of spoken language to printed text. The electronic translation of spoken language may permit erroneous, or at times, nonsensical words or phrases to be inadvertently transcribed; Although I have reviewed the note for such errors, some may still exist.

## 2018-10-15 ENCOUNTER — OFFICE VISIT (OUTPATIENT)
Dept: INTERNAL MEDICINE | Facility: CLINIC | Age: 83
End: 2018-10-15

## 2018-10-15 VITALS
TEMPERATURE: 97.5 F | WEIGHT: 191 LBS | DIASTOLIC BLOOD PRESSURE: 68 MMHG | RESPIRATION RATE: 16 BRPM | SYSTOLIC BLOOD PRESSURE: 142 MMHG | BODY MASS INDEX: 25.87 KG/M2 | HEART RATE: 84 BPM | OXYGEN SATURATION: 91 % | HEIGHT: 72 IN

## 2018-10-15 DIAGNOSIS — K21.9 GASTROESOPHAGEAL REFLUX DISEASE, ESOPHAGITIS PRESENCE NOT SPECIFIED: ICD-10-CM

## 2018-10-15 DIAGNOSIS — Z79.899 MEDICATION MANAGEMENT: ICD-10-CM

## 2018-10-15 DIAGNOSIS — M81.0 OSTEOPOROSIS, UNSPECIFIED OSTEOPOROSIS TYPE, UNSPECIFIED PATHOLOGICAL FRACTURE PRESENCE: ICD-10-CM

## 2018-10-15 DIAGNOSIS — R53.82 CHRONIC FATIGUE: ICD-10-CM

## 2018-10-15 DIAGNOSIS — M35.3 PMR (POLYMYALGIA RHEUMATICA) (HCC): Primary | ICD-10-CM

## 2018-10-15 DIAGNOSIS — R73.02 IGT (IMPAIRED GLUCOSE TOLERANCE): ICD-10-CM

## 2018-10-15 LAB
ERYTHROCYTE [SEDIMENTATION RATE] IN BLOOD BY WESTERGREN METHOD: 12 MM/HR (ref 0–20)
GLUCOSE P FAST SERPL-MCNC: 146 MG/DL (ref 74–106)
HBA1C MFR BLD: 6.5 % (ref 4.8–5.6)

## 2018-10-15 PROCEDURE — 99213 OFFICE O/P EST LOW 20 MIN: CPT | Performed by: INTERNAL MEDICINE

## 2018-10-15 NOTE — PROGRESS NOTES
Subjective   Jim Romreo is a 87 y.o. male.     Chief Complaint   Patient presents with   • Pain     PMR         Pain   Associated symptoms include myalgias. Pertinent negatives include no chills or fatigue.   Arthritis   Presents for follow-up visit. The symptoms have been stable. Affected locations include the right knee, left knee, left DIP and right hip. Pertinent negatives include no diarrhea, dysuria or fatigue.        The following portions of the patient's history were reviewed and updated as appropriate: allergies, current medications, past social history and problem list.    Outpatient Prescriptions Marked as Taking for the 10/15/18 encounter (Office Visit) with Reji Gilman MD   Medication Sig Dispense Refill   • metFORMIN (GLUCOPHAGE) 500 MG tablet Take 1 tablet twice daily 90 tablet 1   • pantoprazole (PROTONIX) 40 MG EC tablet TAKE ONE TABLET BY MOUTH DAILY 90 tablet 0   • pramipexole (MIRAPEX) 0.5 MG tablet TAKE ONE TABLET BY MOUTH TWICE A  tablet 0   • predniSONE (DELTASONE) 10 MG tablet Take 1 tablet by mouth Daily. 30 tablet 2   • predniSONE (DELTASONE) 2.5 MG tablet Take 1 tablet by mouth Daily. 60 tablet 1   • predniSONE (DELTASONE) 5 MG tablet Take 1.5 tablets by mouth Daily. 45 tablet 2   • tamsulosin (FLOMAX) 0.4 MG capsule 24 hr capsule TAKE ONE CAPSULE BY MOUTH TWICE A  capsule 0       Review of Systems   Constitutional: Negative for chills and fatigue.   Gastrointestinal: Negative for diarrhea.   Genitourinary: Negative for dysuria.   Musculoskeletal: Positive for arthritis, back pain and myalgias.       Objective   Vitals:    10/15/18 1509   BP: 142/68   Pulse: 84   Resp: 16   Temp: 97.5 °F (36.4 °C)   SpO2: 91%      1    10/15/18  1509   Weight: 86.6 kg (191 lb)    [unfilled]  Body mass index is 25.9 kg/m².      Physical Exam   Constitutional: He appears well-developed and well-nourished.   Musculoskeletal: Normal range of motion. He exhibits no edema, tenderness or  deformity.   Skin: Skin is warm and dry.         Problem List Items Addressed This Visit        Digestive    GERD (gastroesophageal reflux disease)       Endocrine    IGT (impaired glucose tolerance)    Relevant Orders    Glucose, Plasma ([facility])    Hemoglobin A1c       Nervous and Auditory    PMR (polymyalgia rheumatica) (CMS/[facility]) - Primary    Relevant Orders    Sedimentation Rate        Assessment/Plan   In today with a 12 week illness.  In the lower back, hips, knees, thighs.  He has trouble getting up and down from a seated position.  Trouble walking and bending over.  He states he is stiff and achy.  No jaw claudication.  Some minor symptoms in the shoulder girdle.  He's never had arthritic complaints before.  Symptoms are certainly atypical.  Polymyalgia rheumatica would be the primary concern.  He had been on prednisone 20 mg daily for 6 weeks and felt fantastic on it.  Missed one dose and started feeling bad again.  CRP was elevated.  Sedimentation rate was only minimally elevated.  On 15 mg daily for the past 7 weeks.  That's really a pretty good diagnosis for him.  I discussed getting a second opinion with rheumatology but he is not interested.  Has started on Prolia now.  Added caltrate D.  On metformin now.  Sed rate, glucose, A1c today.  We'll  cut dose of prednisone at 12.5mg daily for now.  Add protonix 40 mg twice a day for recent GI upset.  Follow-up in 6 weeks.            .bmifollowup  Dragon disclaimer:   Much of this encounter note is an electronic transcription/translation of spoken language to printed text. The electronic translation of spoken language may permit erroneous, or at times, nonsensical words or phrases to be inadvertently transcribed; Although I have reviewed the note for such errors, some may still exist.

## 2018-11-12 RX ORDER — PRAMIPEXOLE DIHYDROCHLORIDE 0.5 MG/1
TABLET ORAL
Qty: 180 TABLET | Refills: 0 | Status: SHIPPED | OUTPATIENT
Start: 2018-11-12 | End: 2019-02-14 | Stop reason: SDUPTHER

## 2018-11-12 RX ORDER — TAMSULOSIN HYDROCHLORIDE 0.4 MG/1
CAPSULE ORAL
Qty: 180 CAPSULE | Refills: 0 | Status: SHIPPED | OUTPATIENT
Start: 2018-11-12 | End: 2019-02-24 | Stop reason: SDUPTHER

## 2018-11-26 ENCOUNTER — OFFICE VISIT (OUTPATIENT)
Dept: INTERNAL MEDICINE | Facility: CLINIC | Age: 83
End: 2018-11-26

## 2018-11-26 VITALS
BODY MASS INDEX: 25.73 KG/M2 | DIASTOLIC BLOOD PRESSURE: 74 MMHG | RESPIRATION RATE: 16 BRPM | OXYGEN SATURATION: 98 % | WEIGHT: 190 LBS | SYSTOLIC BLOOD PRESSURE: 168 MMHG | HEIGHT: 72 IN | HEART RATE: 69 BPM | TEMPERATURE: 98 F

## 2018-11-26 DIAGNOSIS — M35.3 PMR (POLYMYALGIA RHEUMATICA) (HCC): Primary | ICD-10-CM

## 2018-11-26 DIAGNOSIS — R73.02 IGT (IMPAIRED GLUCOSE TOLERANCE): ICD-10-CM

## 2018-11-26 LAB
CRP SERPL-MCNC: 0.23 MG/DL (ref 0–0.5)
ERYTHROCYTE [SEDIMENTATION RATE] IN BLOOD BY WESTERGREN METHOD: 4 MM/HR (ref 0–20)
GLUCOSE P FAST SERPL-MCNC: 164 MG/DL (ref 74–106)
HBA1C MFR BLD: 6.86 % (ref 4.8–5.6)

## 2018-11-26 PROCEDURE — 99213 OFFICE O/P EST LOW 20 MIN: CPT | Performed by: INTERNAL MEDICINE

## 2018-11-26 RX ORDER — PREDNISONE 10 MG/1
10 TABLET ORAL DAILY
Qty: 45 TABLET | Refills: 0 | OUTPATIENT
Start: 2018-11-26 | End: 2018-11-29 | Stop reason: SDUPTHER

## 2018-11-26 RX ORDER — CLOTRIMAZOLE 10 MG/1
10 LOZENGE ORAL; TOPICAL
Qty: 35 TABLET | Refills: 0 | Status: SHIPPED | OUTPATIENT
Start: 2018-11-26 | End: 2018-12-06 | Stop reason: SDUPTHER

## 2018-11-26 NOTE — PROGRESS NOTES
Subjective   Jim Romero is a 87 y.o. male.     Chief Complaint   Patient presents with   • Pain     PMR 6- wk follow up   • Arthritis         Pain   This is a chronic problem. The current episode started more than 1 month ago. The problem has been unchanged. Associated symptoms include myalgias. Pertinent negatives include no chills or fatigue.   Arthritis   Presents for follow-up visit. The symptoms have been stable. Affected locations include the right knee, left knee, left DIP and right hip. Pertinent negatives include no diarrhea, dysuria or fatigue.        The following portions of the patient's history were reviewed and updated as appropriate: allergies, current medications, past social history and problem list.    Outpatient Medications Marked as Taking for the 11/26/18 encounter (Office Visit) with Reji Gilman MD   Medication Sig Dispense Refill   • metFORMIN (GLUCOPHAGE) 500 MG tablet Take 1 tablet twice daily 180 tablet 1   • pantoprazole (PROTONIX) 40 MG EC tablet TAKE ONE TABLET BY MOUTH DAILY 90 tablet 0   • pramipexole (MIRAPEX) 0.5 MG tablet TAKE ONE TABLET BY MOUTH TWICE A  tablet 0   • predniSONE (DELTASONE) 10 MG tablet Take 1 tablet by mouth Daily. 30 tablet 2   • predniSONE (DELTASONE) 2.5 MG tablet Take 1 tablet by mouth Daily. 60 tablet 1   • tamsulosin (FLOMAX) 0.4 MG capsule 24 hr capsule TAKE ONE CAPSULE BY MOUTH TWICE A  capsule 0   • [DISCONTINUED] metFORMIN (GLUCOPHAGE) 500 MG tablet Take 1 tablet twice daily 90 tablet 1   • [DISCONTINUED] metFORMIN (GLUCOPHAGE) 500 MG tablet Take 1 tablet twice daily 90 tablet 1   • [DISCONTINUED] predniSONE (DELTASONE) 20 MG tablet TAKE ONE TABLET BY MOUTH DAILY 30 tablet 0   • [DISCONTINUED] predniSONE (DELTASONE) 5 MG tablet Take 1.5 tablets by mouth Daily. 45 tablet 2       Review of Systems   Constitutional: Negative for chills and fatigue.   Gastrointestinal: Negative for diarrhea.   Genitourinary: Negative for dysuria.    Musculoskeletal: Positive for arthritis, back pain and myalgias.       Objective   Vitals:    11/26/18 1530   BP: 168/74   Pulse: 69   Resp: 16   Temp: 98 °F (36.7 °C)   SpO2: 98%          11/26/18  1530   Weight: 86.2 kg (190 lb)    [unfilled]  Body mass index is 25.76 kg/m².      Physical Exam   Constitutional: He appears well-developed and well-nourished.   Musculoskeletal: Normal range of motion. He exhibits no edema, tenderness or deformity.   Skin: Skin is warm and dry.         Problem List Items Addressed This Visit        Nervous and Auditory    PMR (polymyalgia rheumatica) (CMS/[facility]) - Primary        Assessment/Plan   In today with a 16 week illness.  In the lower back, hips, knees, thighs.  He has trouble getting up and down from a seated position.  Trouble walking and bending over.  He states he was stiff and achy but that has cleared up.  No jaw claudication.  Some minor symptoms in the shoulder girdle.  Polymyalgia rheumatica would be the primary concern.  He had been on prednisone 12.5 mg daily for the past 4 weeks.  CRP was elevated.  Sedimentation rate was only minimally elevated.  Has started on Prolia now.  Added caltrate D.  On metformin now.  Sed rate, CRP, glucose, A1c today.  We'll  cut dose of prednisone at 10 mg daily for now.  Off of protonix and GI upset cleared up.  Remains on Nexium.  He has some soreness along the lateral aspect of the tongue on the right side posterior.  I could not see any pathology but suspect he has some underlying Yeast infection.  We'll begin on Mycelex troches.  Recheck in 6 weeks.  Will move out to 8 weeks on visits after next time.           .bmifollowup  Dragon disclaimer:   Much of this encounter note is an electronic transcription/translation of spoken language to printed text. The electronic translation of spoken language may permit erroneous, or at times, nonsensical words or phrases to be inadvertently transcribed; Although I have reviewed the note  for such errors, some may still exist.

## 2018-11-29 RX ORDER — PREDNISONE 10 MG/1
10 TABLET ORAL DAILY
Qty: 45 TABLET | Refills: 0 | OUTPATIENT
Start: 2018-11-29 | End: 2019-01-14

## 2018-11-29 NOTE — TELEPHONE ENCOUNTER
Patient called explaining Jimmyoger did not receive e-script for Prednisone. E-scribed medication a second time.

## 2018-12-06 ENCOUNTER — TELEPHONE (OUTPATIENT)
Dept: INTERNAL MEDICINE | Facility: CLINIC | Age: 83
End: 2018-12-06

## 2018-12-06 RX ORDER — CLOTRIMAZOLE 10 MG/1
10 LOZENGE ORAL; TOPICAL
Qty: 35 TABLET | Refills: 0 | Status: SHIPPED | OUTPATIENT
Start: 2018-12-06 | End: 2019-05-22

## 2018-12-06 NOTE — TELEPHONE ENCOUNTER
Patient states the yeast infection in his mouth is some better but not completely cleared.  He has finished the Rx you gave him for this, should he have another round?  Please advise.    Yes.  Give him another round.

## 2019-01-14 ENCOUNTER — OFFICE VISIT (OUTPATIENT)
Dept: INTERNAL MEDICINE | Facility: CLINIC | Age: 84
End: 2019-01-14

## 2019-01-14 VITALS
HEART RATE: 81 BPM | DIASTOLIC BLOOD PRESSURE: 62 MMHG | BODY MASS INDEX: 26.01 KG/M2 | SYSTOLIC BLOOD PRESSURE: 124 MMHG | WEIGHT: 192 LBS | TEMPERATURE: 99 F | HEIGHT: 72 IN | RESPIRATION RATE: 16 BRPM | OXYGEN SATURATION: 99 %

## 2019-01-14 DIAGNOSIS — R53.82 CHRONIC FATIGUE: ICD-10-CM

## 2019-01-14 DIAGNOSIS — M35.3 PMR (POLYMYALGIA RHEUMATICA) (HCC): Primary | ICD-10-CM

## 2019-01-14 DIAGNOSIS — K21.9 GASTROESOPHAGEAL REFLUX DISEASE, ESOPHAGITIS PRESENCE NOT SPECIFIED: ICD-10-CM

## 2019-01-14 DIAGNOSIS — G25.81 RLS (RESTLESS LEGS SYNDROME): ICD-10-CM

## 2019-01-14 DIAGNOSIS — R73.02 IGT (IMPAIRED GLUCOSE TOLERANCE): ICD-10-CM

## 2019-01-14 PROCEDURE — 99213 OFFICE O/P EST LOW 20 MIN: CPT | Performed by: INTERNAL MEDICINE

## 2019-01-14 RX ORDER — PREDNISONE 1 MG/1
7.5 TABLET ORAL DAILY
Qty: 100 TABLET | Refills: 0 | Status: SHIPPED | OUTPATIENT
Start: 2019-01-14 | End: 2019-03-28 | Stop reason: SDUPTHER

## 2019-01-14 NOTE — PROGRESS NOTES
Subjective   Jmi Romero is a 87 y.o. male.     Chief Complaint   Patient presents with   • Pain     PMR         Pain   This is a chronic problem. The current episode started more than 1 month ago. The problem has been unchanged. Associated symptoms include myalgias. Pertinent negatives include no chills or fatigue.   Arthritis   Presents for follow-up visit. The symptoms have been stable. Affected locations include the right knee, left knee, left DIP and right hip. Pertinent negatives include no diarrhea, dysuria or fatigue.        The following portions of the patient's history were reviewed and updated as appropriate: allergies, current medications, past social history and problem list.    Outpatient Medications Marked as Taking for the 1/14/19 encounter (Office Visit) with Reji Gilman MD   Medication Sig Dispense Refill   • clotrimazole (MYCELEX) 10 MG elisha Take 1 tablet by mouth 5 (Five) Times a Day. 35 tablet 0   • metFORMIN (GLUCOPHAGE) 500 MG tablet Take 1 tablet twice daily 180 tablet 1   • pantoprazole (PROTONIX) 40 MG EC tablet TAKE ONE TABLET BY MOUTH DAILY 90 tablet 0   • pramipexole (MIRAPEX) 0.5 MG tablet TAKE ONE TABLET BY MOUTH TWICE A  tablet 0   • predniSONE (DELTASONE) 10 MG tablet Take 1 tablet by mouth Daily. 45 tablet 0   • tamsulosin (FLOMAX) 0.4 MG capsule 24 hr capsule TAKE ONE CAPSULE BY MOUTH TWICE A  capsule 0       Review of Systems   Constitutional: Negative for chills and fatigue.   Gastrointestinal: Negative for diarrhea.   Genitourinary: Negative for dysuria.   Musculoskeletal: Positive for arthritis, back pain and myalgias.       Objective   Vitals:    01/14/19 1539   BP: 124/62   Pulse: 81   Resp: 16   Temp: 99 °F (37.2 °C)   SpO2: 99%          01/14/19  1539   Weight: 87.1 kg (192 lb)    [unfilled]  Body mass index is 26.03 kg/m².      Physical Exam   Constitutional: He appears well-developed and well-nourished.   Musculoskeletal: Normal range of motion.  He exhibits no edema, tenderness or deformity.   Skin: Skin is warm and dry.         Problem List Items Addressed This Visit        Digestive    GERD (gastroesophageal reflux disease) - Primary       Endocrine    IGT (impaired glucose tolerance)       Other    RLS (restless legs syndrome)    Chronic fatigue        Assessment/Plan   In today with a 22 week illness.  In the lower back, hips, knees, thighs.  He has trouble getting up and down from a seated position.  Trouble walking and bending over.  He states he was stiff and achy but that has cleared up.  No jaw claudication.  Some minor symptoms in the shoulder girdle.  Polymyalgia rheumatica would be the primary concern.  He had been on prednisone 10 mg daily for the past 6 weeks.  CRP was elevated.  Sedimentation rate was only minimally elevated.  Has started on Prolia now.  Added caltrate D.  On metformin now.  Sed rate, CRP, glucose, A1c today.  We'll  cut dose of prednisone at 8 mg daily for now.  Off of protonix and GI upset cleared up.  Remains on Nexium.  Tongue symptoms cleared up with Mycelex after last visit. Will move out to 8 weeks on visits after next time.           .bmifollowup  Dragon disclaimer:   Much of this encounter note is an electronic transcription/translation of spoken language to printed text. The electronic translation of spoken language may permit erroneous, or at times, nonsensical words or phrases to be inadvertently transcribed; Although I have reviewed the note for such errors, some may still exist.

## 2019-01-15 LAB
CRP SERPL-MCNC: 0.2 MG/DL (ref 0–0.5)
ERYTHROCYTE [SEDIMENTATION RATE] IN BLOOD BY WESTERGREN METHOD: 4 MM/HR (ref 0–20)
GLUCOSE P FAST SERPL-MCNC: 201 MG/DL (ref 74–106)
HBA1C MFR BLD: 7 % (ref 4.8–5.6)

## 2019-02-14 RX ORDER — PRAMIPEXOLE DIHYDROCHLORIDE 0.5 MG/1
TABLET ORAL
Qty: 180 TABLET | Refills: 0 | Status: SHIPPED | OUTPATIENT
Start: 2019-02-14 | End: 2019-04-21 | Stop reason: SDUPTHER

## 2019-02-25 RX ORDER — PANTOPRAZOLE SODIUM 40 MG/1
TABLET, DELAYED RELEASE ORAL
Qty: 90 TABLET | Refills: 0 | Status: SHIPPED | OUTPATIENT
Start: 2019-02-25 | End: 2019-06-03 | Stop reason: SDUPTHER

## 2019-02-25 RX ORDER — TAMSULOSIN HYDROCHLORIDE 0.4 MG/1
CAPSULE ORAL
Qty: 180 CAPSULE | Refills: 0 | Status: SHIPPED | OUTPATIENT
Start: 2019-02-25 | End: 2019-06-03 | Stop reason: SDUPTHER

## 2019-03-11 ENCOUNTER — OFFICE VISIT (OUTPATIENT)
Dept: INTERNAL MEDICINE | Facility: CLINIC | Age: 84
End: 2019-03-11

## 2019-03-11 VITALS
HEIGHT: 72 IN | HEART RATE: 80 BPM | OXYGEN SATURATION: 98 % | BODY MASS INDEX: 25.81 KG/M2 | TEMPERATURE: 97.6 F | RESPIRATION RATE: 16 BRPM | SYSTOLIC BLOOD PRESSURE: 108 MMHG | WEIGHT: 190.6 LBS | DIASTOLIC BLOOD PRESSURE: 62 MMHG

## 2019-03-11 DIAGNOSIS — R73.02 IGT (IMPAIRED GLUCOSE TOLERANCE): ICD-10-CM

## 2019-03-11 DIAGNOSIS — M35.3 PMR (POLYMYALGIA RHEUMATICA) (HCC): Primary | ICD-10-CM

## 2019-03-11 PROCEDURE — 99213 OFFICE O/P EST LOW 20 MIN: CPT | Performed by: INTERNAL MEDICINE

## 2019-03-11 NOTE — PROGRESS NOTES
Subjective   Jim Romero is a 87 y.o. male.     Chief Complaint   Patient presents with   • Pain     PMR   • Arthritis         Pain   This is a chronic problem. The current episode started more than 1 month ago. The problem has been unchanged. Associated symptoms include myalgias. Pertinent negatives include no chills or fatigue.   Arthritis   Presents for follow-up visit. The symptoms have been stable. Affected locations include the right knee, left knee, left DIP and right hip. Pertinent negatives include no diarrhea, dysuria or fatigue.        The following portions of the patient's history were reviewed and updated as appropriate: allergies, current medications, past social history and problem list.    Outpatient Medications Marked as Taking for the 3/11/19 encounter (Office Visit) with Reji Gilman MD   Medication Sig Dispense Refill   • clotrimazole (MYCELEX) 10 MG elisha Take 1 tablet by mouth 5 (Five) Times a Day. 35 tablet 0   • metFORMIN (GLUCOPHAGE) 500 MG tablet Take 1 tablet twice daily 180 tablet 1   • pantoprazole (PROTONIX) 40 MG EC tablet TAKE ONE TABLET BY MOUTH DAILY 90 tablet 0   • pramipexole (MIRAPEX) 0.5 MG tablet TAKE ONE TABLET BY MOUTH TWICE A  tablet 0   • predniSONE (DELTASONE) 5 MG tablet Take 1.5 tablets by mouth Daily. 100 tablet 0   • tamsulosin (FLOMAX) 0.4 MG capsule 24 hr capsule TAKE ONE CAPSULE BY MOUTH TWICE A  capsule 0       Review of Systems   Constitutional: Negative for chills and fatigue.   Gastrointestinal: Negative for diarrhea.   Genitourinary: Negative for dysuria.   Musculoskeletal: Positive for arthritis, back pain and myalgias.       Objective   Vitals:    03/11/19 1508   BP: 108/62   Pulse: 80   Resp: 16   Temp: 97.6 °F (36.4 °C)   SpO2: 98%          03/11/19  1508   Weight: 86.5 kg (190 lb 9.6 oz)    [unfilled]  Body mass index is 25.84 kg/m².      Physical Exam   Constitutional: He appears well-developed and well-nourished.    Musculoskeletal: Normal range of motion. He exhibits no edema, tenderness or deformity.   Skin: Skin is warm and dry.         Problem List Items Addressed This Visit        Nervous and Auditory    PMR (polymyalgia rheumatica) (CMS/[facility]) - Primary        Assessment/Plan   In today for recheck of PMR.  Ache the lower back, hips, knees, thighs.  He has trouble getting up and down from a seated position.  Trouble walking and bending over.  He states he was stiff and achy but that has cleared up.  No jaw claudication.  Some minor symptoms in the shoulder girdle.  Polymyalgia rheumatica would be the primary concern.  He had been on prednisone 7.5 mg daily for the past 10 weeks.  CRP was elevated.  Sedimentation rate was only minimally elevated.  Has started on Prolia.  Added caltrate D.  On metformin now.  Sed rate, CRP, glucose, A1c today.  Currently is asymptomatic.  Had some stiffness when he dropped down to 7.5 mg but that then resolved.  Will recheck again in 8 weeks.  Cut prednisone to 6 mg daily today.  He started on prednisone on July 9, 2018.           .lucafollowup  Dragon disclaimer:   Much of this encounter note is an electronic transcription/translation of spoken language to printed text. The electronic translation of spoken language may permit erroneous, or at times, nonsensical words or phrases to be inadvertently transcribed; Although I have reviewed the note for such errors, some may still exist.

## 2019-03-12 LAB
CRP SERPL-MCNC: 0.06 MG/DL (ref 0–0.5)
ERYTHROCYTE [SEDIMENTATION RATE] IN BLOOD BY WESTERGREN METHOD: 5 MM/HR (ref 0–20)
GLUCOSE P FAST SERPL-MCNC: 160 MG/DL (ref 74–106)
HBA1C MFR BLD: 6.82 % (ref 4.8–5.6)

## 2019-03-28 RX ORDER — PREDNISONE 1 MG/1
5 TABLET ORAL DAILY
Qty: 30 TABLET | Refills: 3 | Status: SHIPPED | OUTPATIENT
Start: 2019-03-28 | End: 2019-05-24 | Stop reason: SDUPTHER

## 2019-03-28 RX ORDER — PREDNISONE 1 MG/1
1 TABLET ORAL DAILY
Qty: 30 TABLET | Refills: 3 | Status: SHIPPED | OUTPATIENT
Start: 2019-03-28 | End: 2019-05-24

## 2019-04-22 RX ORDER — PRAMIPEXOLE DIHYDROCHLORIDE 0.5 MG/1
TABLET ORAL
Qty: 180 TABLET | Refills: 0 | Status: SHIPPED | OUTPATIENT
Start: 2019-04-22 | End: 2019-07-09 | Stop reason: SDUPTHER

## 2019-05-22 ENCOUNTER — OFFICE VISIT (OUTPATIENT)
Dept: INTERNAL MEDICINE | Facility: CLINIC | Age: 84
End: 2019-05-22

## 2019-05-22 VITALS
RESPIRATION RATE: 16 BRPM | SYSTOLIC BLOOD PRESSURE: 118 MMHG | DIASTOLIC BLOOD PRESSURE: 64 MMHG | WEIGHT: 182.8 LBS | TEMPERATURE: 98.8 F | OXYGEN SATURATION: 95 % | HEART RATE: 77 BPM | BODY MASS INDEX: 24.76 KG/M2 | HEIGHT: 72 IN

## 2019-05-22 DIAGNOSIS — K21.9 GASTROESOPHAGEAL REFLUX DISEASE, ESOPHAGITIS PRESENCE NOT SPECIFIED: ICD-10-CM

## 2019-05-22 DIAGNOSIS — M35.3 PMR (POLYMYALGIA RHEUMATICA) (HCC): Primary | ICD-10-CM

## 2019-05-22 DIAGNOSIS — J06.9 VIRAL UPPER RESPIRATORY TRACT INFECTION: ICD-10-CM

## 2019-05-22 DIAGNOSIS — M81.0 OSTEOPOROSIS, UNSPECIFIED OSTEOPOROSIS TYPE, UNSPECIFIED PATHOLOGICAL FRACTURE PRESENCE: ICD-10-CM

## 2019-05-22 DIAGNOSIS — R73.02 IGT (IMPAIRED GLUCOSE TOLERANCE): ICD-10-CM

## 2019-05-22 PROCEDURE — G0439 PPPS, SUBSEQ VISIT: HCPCS | Performed by: INTERNAL MEDICINE

## 2019-05-22 PROCEDURE — 99214 OFFICE O/P EST MOD 30 MIN: CPT | Performed by: INTERNAL MEDICINE

## 2019-05-22 RX ORDER — AZITHROMYCIN 250 MG/1
TABLET, FILM COATED ORAL
Qty: 6 TABLET | Refills: 0 | Status: SHIPPED | OUTPATIENT
Start: 2019-05-22 | End: 2019-06-25

## 2019-05-22 RX ORDER — AZITHROMYCIN 250 MG/1
TABLET, FILM COATED ORAL
Qty: 6 TABLET | Refills: 0 | Status: SHIPPED | OUTPATIENT
Start: 2019-05-22 | End: 2019-05-22 | Stop reason: SDUPTHER

## 2019-05-22 NOTE — PROGRESS NOTES
QUICK REFERENCE INFORMATION:  The ABCs of the Annual Wellness Visit    Subsequent Medicare Wellness Visit     HEALTH RISK ASSESSMENT    : 3/17/1931    Recent Hospitalizations:  No hospitalization(s) within the last year..  ccc      Current Medical Providers:  Patient Care Team:  Reji Gilman MD as PCP - General (Internal Medicine)  Reji Gilman MD as PCP - Internal Medicine (Internal Medicine)  Reji Gilman MD as PCP - Claims Attributed        Smoking Status:  Social History     Tobacco Use   Smoking Status Former Smoker   • Types: Cigarettes   • Last attempt to quit:    • Years since quittin.4   Smokeless Tobacco Never Used       Alcohol Consumption:  Social History     Substance and Sexual Activity   Alcohol Use Yes   • Frequency: Monthly or less   • Drinks per session: 1 or 2   • Binge frequency: Never    Comment: 1-2 x month       Depression Screen:   PHQ-2/PHQ-9 Depression Screening 2019   Little interest or pleasure in doing things 0   Feeling down, depressed, or hopeless 0   Trouble falling or staying asleep, or sleeping too much 0   Feeling tired or having little energy 0   Poor appetite or overeating 0   Feeling bad about yourself - or that you are a failure or have let yourself or your family down 0   Trouble concentrating on things, such as reading the newspaper or watching television 0   Moving or speaking so slowly that other people could have noticed. Or the opposite - being so fidgety or restless that you have been moving around a lot more than usual 0   Thoughts that you would be better off dead, or of hurting yourself in some way 0   Total Score 0   If you checked off any problems, how difficult have these problems made it for you to do your work, take care of things at home, or get along with other people? -       Health Habits and Functional and Cognitive Screening:  Functional & Cognitive Status 2019   Do you have difficulty preparing food and eating? No   Do you  have difficulty bathing yourself, getting dressed or grooming yourself? No   Do you have difficulty using the toilet? No   Do you have difficulty moving around from place to place? No   Do you have trouble with steps or getting out of a bed or a chair? No   In the past year have you fallen or experienced a near fall? No   Current Diet Well Balanced Diet   Dental Exam Up to date   Eye Exam Up to date   Exercise (times per week) 2 times per week   Current Exercise Activities Include Yard Work   Do you need help using the phone?  No   Are you deaf or do you have serious difficulty hearing?  No   Do you need help with transportation? No   Do you need help shopping? No   Do you need help preparing meals?  No   Do you need help with housework?  No   Do you need help with laundry? No   Do you need help taking your medications? No   Do you need help managing money? No   Do you ever drive or ride in a car without wearing a seat belt? No   Have you felt unusual stress, anger or loneliness in the last month? No   Who do you live with? Spouse   If you need help, do you have trouble finding someone available to you? No   Have you been bothered in the last four weeks by sexual problems? No   Do you have difficulty concentrating, remembering or making decisions? No           Does the patient have evidence of cognitive impairment? No    Asiprin use counseling: Does not need ASA (and currently is not on it)      Recent Lab Results:    Lab Results   Component Value Date     (H) 08/06/2018     Lab Results   Component Value Date    HGBA1C 6.82 (H) 03/11/2019              Age-appropriate Screening Schedule:  Refer to the list below for future screening recommendations based on patient's age, sex and/or medical conditions. Orders for these recommended tests are listed in the plan section. The patient has been provided with a written plan.    Health Maintenance   Topic Date Due   • INFLUENZA VACCINE  08/01/2019   • DXA SCAN   07/23/2020   • TDAP/TD VACCINES (2 - Td) 05/17/2028   • PNEUMOCOCCAL VACCINES (65+ LOW/MEDIUM RISK)  Completed   • ZOSTER VACCINE  Discontinued        Subjective   History of Present Illness    Jim Romero is a 88 y.o. male who presents for an Annual Wellness Visit.    The following portions of the patient's history were reviewed and updated as appropriate: allergies, current medications, past family history, past medical history, past social history, past surgical history and problem list.    Outpatient Medications Prior to Visit   Medication Sig Dispense Refill   • metFORMIN (GLUCOPHAGE) 500 MG tablet Take 1 tablet twice daily 180 tablet 1   • pantoprazole (PROTONIX) 40 MG EC tablet TAKE ONE TABLET BY MOUTH DAILY 90 tablet 0   • pramipexole (MIRAPEX) 0.5 MG tablet TAKE ONE TABLET BY MOUTH TWICE A  tablet 0   • predniSONE (DELTASONE) 1 MG tablet Take 1 tablet by mouth Daily. 30 tablet 3   • predniSONE (DELTASONE) 5 MG tablet Take 1 tablet by mouth Daily. 30 tablet 3   • tamsulosin (FLOMAX) 0.4 MG capsule 24 hr capsule TAKE ONE CAPSULE BY MOUTH TWICE A  capsule 0   • clotrimazole (MYCELEX) 10 MG elisha Take 1 tablet by mouth 5 (Five) Times a Day. 35 tablet 0     No facility-administered medications prior to visit.        Patient Active Problem List   Diagnosis   • RLS (restless legs syndrome)   • BPH (benign prostatic hyperplasia)   • GERD (gastroesophageal reflux disease)   • Sciatica   • Chronic fatigue   • IGT (impaired glucose tolerance)   • PMR (polymyalgia rheumatica) (CMS/[facility])   • Osteoporosis       Advance Care Planning:  Patient has an advance directive - a copy has not been provided. Have asked the patient to send this to us to add to record    Identification of Risk Factors:  Risk factors include: BPH.    Review of Systems    Compared to one year ago, the patient feels his physical health is the same.  Compared to one year ago, the patient feels his mental health is the same.    Objective   "   Physical Exam    Vitals:    05/22/19 0954   BP: 118/64   BP Location: Left arm   Patient Position: Sitting   Pulse: 77   Resp: 16   Temp: 98.8 °F (37.1 °C)   SpO2: 95%   Weight: 82.9 kg (182 lb 12.8 oz)   Height: 182.9 cm (72.01\")   PainSc:   2       Patient's Body mass index is 24.79 kg/m². BMI is within normal parameters. No follow-up required..      Assessment/Plan   Patient Self-Management and Personalized Health Advice  The patient has been provided with information about: NA and preventive services including:   · Fall Risk assessment done.    Visit Diagnoses:  No diagnosis found.    No orders of the defined types were placed in this encounter.      Outpatient Encounter Medications as of 5/22/2019   Medication Sig Dispense Refill   • metFORMIN (GLUCOPHAGE) 500 MG tablet Take 1 tablet twice daily 180 tablet 1   • pantoprazole (PROTONIX) 40 MG EC tablet TAKE ONE TABLET BY MOUTH DAILY 90 tablet 0   • pramipexole (MIRAPEX) 0.5 MG tablet TAKE ONE TABLET BY MOUTH TWICE A  tablet 0   • predniSONE (DELTASONE) 1 MG tablet Take 1 tablet by mouth Daily. 30 tablet 3   • predniSONE (DELTASONE) 5 MG tablet Take 1 tablet by mouth Daily. 30 tablet 3   • tamsulosin (FLOMAX) 0.4 MG capsule 24 hr capsule TAKE ONE CAPSULE BY MOUTH TWICE A  capsule 0   • [DISCONTINUED] clotrimazole (MYCELEX) 10 MG elisha Take 1 tablet by mouth 5 (Five) Times a Day. 35 tablet 0     No facility-administered encounter medications on file as of 5/22/2019.        Reviewed use of high risk medication in the elderly: yes  Reviewed for potential of harmful drug interactions in the elderly: yes    Follow Up:  No Follow-up on file.     An After Visit Summary and PPPS with all of these plans were given to the patient.               "

## 2019-05-23 LAB
CRP SERPL-MCNC: 0.12 MG/DL (ref 0–0.5)
ERYTHROCYTE [SEDIMENTATION RATE] IN BLOOD BY WESTERGREN METHOD: 2 MM/HR (ref 0–20)
GLUCOSE P FAST SERPL-MCNC: 82 MG/DL (ref 74–106)
HBA1C MFR BLD: 6.6 % (ref 4.8–5.6)

## 2019-05-24 RX ORDER — PREDNISONE 1 MG/1
5 TABLET ORAL EVERY MORNING
Qty: 30 TABLET | Refills: 3 | Status: SHIPPED | OUTPATIENT
Start: 2019-05-24 | End: 2019-07-15 | Stop reason: SDUPTHER

## 2019-06-03 RX ORDER — TAMSULOSIN HYDROCHLORIDE 0.4 MG/1
CAPSULE ORAL
Qty: 180 CAPSULE | Refills: 0 | Status: SHIPPED | OUTPATIENT
Start: 2019-06-03 | End: 2019-09-01 | Stop reason: SDUPTHER

## 2019-06-03 RX ORDER — PANTOPRAZOLE SODIUM 40 MG/1
TABLET, DELAYED RELEASE ORAL
Qty: 90 TABLET | Refills: 0 | Status: SHIPPED | OUTPATIENT
Start: 2019-06-03 | End: 2019-09-01 | Stop reason: SDUPTHER

## 2019-06-25 ENCOUNTER — OFFICE VISIT (OUTPATIENT)
Dept: INTERNAL MEDICINE | Facility: CLINIC | Age: 84
End: 2019-06-25

## 2019-06-25 VITALS
TEMPERATURE: 98.9 F | HEIGHT: 72 IN | OXYGEN SATURATION: 97 % | WEIGHT: 177.8 LBS | BODY MASS INDEX: 24.08 KG/M2 | RESPIRATION RATE: 16 BRPM | DIASTOLIC BLOOD PRESSURE: 62 MMHG | SYSTOLIC BLOOD PRESSURE: 118 MMHG | HEART RATE: 73 BPM

## 2019-06-25 DIAGNOSIS — M12.811 ROTATOR CUFF ARTHROPATHY, RIGHT: Primary | ICD-10-CM

## 2019-06-25 PROCEDURE — 99213 OFFICE O/P EST LOW 20 MIN: CPT | Performed by: INTERNAL MEDICINE

## 2019-06-25 NOTE — PROGRESS NOTES
Subjective   Jim Romero is a 88 y.o. male.     Chief Complaint   Patient presents with   • Shoulder Pain     Right         Shoulder Injury    The incident occurred at home. The right shoulder is affected. The incident occurred more than 1 week ago. The injury mechanism was repetitive motion. The quality of the pain is described as aching. The pain is moderate. The symptoms are aggravated by movement and overhead lifting. He has tried acetaminophen for the symptoms. The treatment provided no relief.        The following portions of the patient's history were reviewed and updated as appropriate: allergies, current medications, past social history and problem list.    Outpatient Medications Marked as Taking for the 6/25/19 encounter (Office Visit) with Reji Gilman MD   Medication Sig Dispense Refill   • metFORMIN (GLUCOPHAGE) 500 MG tablet Take 1 tablet twice daily 180 tablet 1   • pantoprazole (PROTONIX) 40 MG EC tablet TAKE ONE TABLET BY MOUTH DAILY 90 tablet 0   • pramipexole (MIRAPEX) 0.5 MG tablet TAKE ONE TABLET BY MOUTH TWICE A  tablet 0   • predniSONE (DELTASONE) 5 MG tablet Take 1 tablet by mouth Every Morning. 30 tablet 3   • tamsulosin (FLOMAX) 0.4 MG capsule 24 hr capsule TAKE ONE CAPSULE BY MOUTH TWICE A  capsule 0       Review of Systems   Constitutional: Negative for chills and fever.   Musculoskeletal: Positive for arthralgias. Negative for neck pain.       Objective   Vitals:    06/25/19 1430   BP: 118/62   Pulse: 73   Resp: 16   Temp: 98.9 °F (37.2 °C)   SpO2: 97%      Wt Readings from Last 3 Encounters:   06/25/19 80.6 kg (177 lb 12.8 oz)   05/22/19 82.9 kg (182 lb 12.8 oz)   03/11/19 86.5 kg (190 lb 9.6 oz)    Body mass index is 24.11 kg/m².      Physical Exam   Constitutional: He appears well-developed and well-nourished.   Musculoskeletal: He exhibits no edema, tenderness or deformity.        Right shoulder: He exhibits decreased range of motion.         Problem List Items  Addressed This Visit     None      Visit Diagnoses     Rotator cuff arthropathy, right    -  Primary        Assessment/Plan   In with 4 to 5 weeks of right shoulder pain.  Not sure how he hurt it.  Clearly exacerbated it after a round of golf.  Played a second rounded seem to make it worse.  Had trouble with the left shoulder many years ago.  He cleared up with a shot of cortisone and some physical therapy.  He is got some mild decreased range of motion.  Some impingement signs on rotation testing.  No localized tenderness.  Set up for physical therapy.  He is currently on 5 mg of prednisone for his PMR.  We will inject with 80 mg Depo-Medrol in the subdeltoid bursa to respond to PT.  We are out of Depo-Medrol today.             Dragon disclaimer:   Much of this encounter note is an electronic transcription/translation of spoken language to printed text. The electronic translation of spoken language may permit erroneous, or at times, nonsensical words or phrases to be inadvertently transcribed; Although I have reviewed the note for such errors, some may still exist.

## 2019-06-25 NOTE — ADDENDUM NOTE
Addended by: DIMITRI SHRESTHA on: 6/25/2019 03:00 PM     Modules accepted: Orders     Patient : Katelin Dixon Age: 39 year old Sex: female   MRN: [id number] Encounter Date: 8/24/2017      History     Chief Complaint   Patient presents with   • Chest Pain (Adult)     HPI  This is a 39 old female with significant past medical history of hypertension, hyperlipidemia who presents emergency department for further evaluation of right-sided chest pain that radiates to the back. The patient states approximately 3 days prior she first noted this pain. She states that he has been intermittent until today when at onset at approximate 1400 while she was at rest. The patient describes the pain as sharp and radiating to the middle of her back and her right scapula. The patient additionally describes a ripping and tearing pain associated with this however this is also exacerbated with movement. The patient denies associated shortness of breath, lower extremity pain or swelling or dyspnea. Patient additionally denies any associated black or bloody stools, fever or chills, productive cough, prior history of DVT or PE. The patient does not take aspirin daily. The patient's mother does have a significant history of CAD and congestive heart failure with onset in her mid to late 40s.    No Known Allergies    Discharge Medication List as of 8/24/2017  8:38 PM      CONTINUE these medications which have NOT CHANGED    Details   SIMVASTATIN PO Take  by mouth.Historical Med             Discharge Medication List as of 8/24/2017  8:38 PM      START taking these medications    Details   acetaminophen (TYLENOL) 325 MG tablet Take 2 tablets by mouth every 6 hours as needed for Pain.Normal, Disp-30 tablet, R-0             Past Medical History:   Diagnosis Date   • Essential (primary) hypertension    • Other and unspecified hyperlipidemia        No past surgical history on file.    Family History   Problem Relation Age of Onset   • High blood pressure Mother        Social History   Substance Use Topics   • Smoking status: Never  Smoker   • Smokeless tobacco: Never Used   • Alcohol use No       Review of Systems   Constitutional: Negative for fever.   Respiratory: Negative for shortness of breath.    Cardiovascular: Positive for chest pain. Negative for palpitations and leg swelling.   Gastrointestinal: Negative for abdominal pain.   All other systems reviewed and are negative.      Physical Exam     ED Triage Vitals   ED Triage Vitals Group      Temp --       Pulse 08/24/17 1718 84      Resp 08/24/17 1722 20      BP 08/24/17 1718 154/89      SpO2 08/24/17 1722 99 %      EtCO2 mmHg --       Height 08/24/17 1739 5' 5\" (1.651 m)      Weight 08/24/17 1739 170 lb (77.1 kg)      Weight Scale Used 08/24/17 1739 ED Stated       Physical Exam   Constitutional: She is oriented to person, place, and time. She appears well-developed and well-nourished. No distress.   HENT:   Head: Normocephalic and atraumatic.   Nose: Nose normal.   Mouth/Throat: Oropharynx is clear and moist. No oropharyngeal exudate.   Eyes: Conjunctivae and EOM are normal. Pupils are equal, round, and reactive to light. No scleral icterus.   Neck: Normal range of motion. Neck supple.   Cardiovascular: Normal rate, regular rhythm and normal heart sounds.    2+ radial and femoral pulses bilaterally     Pulmonary/Chest: Effort normal and breath sounds normal. No stridor. No respiratory distress. She has no wheezes. She has no rales. She exhibits no tenderness.   Abdominal: Soft. Bowel sounds are normal. She exhibits no distension and no mass. There is no tenderness. There is no rebound and no guarding.   Musculoskeletal: Normal range of motion. She exhibits no edema or tenderness.   Neurological: She is alert and oriented to person, place, and time. No cranial nerve deficit. Coordination normal.   Skin: Skin is warm and dry. No rash noted. She is not diaphoretic. No erythema. No pallor.   Psychiatric: Her behavior is normal. Thought content normal.   Nursing note and vitals  reviewed.      ED Course     Procedures    Lab Results     Results for orders placed or performed during the hospital encounter of 08/24/17   CBC & Auto Differential   Result Value Ref Range    WBC 13.7 (H) 4.2 - 11.0 K/mcL    RBC 4.09 4.00 - 5.20 mil/mcL    HGB 11.5 (L) 12.0 - 15.5 g/dL    HCT 34.9 (L) 36.0 - 46.5 %    MCV 85.3 78.0 - 100.0 fl    MCH 28.1 26.0 - 34.0 pg    MCHC 33.0 32.0 - 36.5 g/dL    RDW-CV 14.7 11.0 - 15.0 %     140 - 450 K/mcL    DIFF TYPE AUTOMATED DIFFERENTIAL     Neutrophil 58 %    LYMPH 34 %    MONO 6 %    EOSIN 2 %    BASO 0 %    Absolute Neutrophil 8.0 (H) 1.8 - 7.7 K/mcL    Absolute Lymph 4.7 1.0 - 4.8 K/mcL    Absolute Mono 0.8 0.3 - 0.9 K/mcL    Absolute Eos 0.2 0.1 - 0.5 K/mcL    Absolute Baso 0.0 0.0 - 0.3 K/mcL   Prothrombin Time   Result Value Ref Range    PROTIME 10.5 9.7 - 11.8 sec    INR 1.0    Partial Thromboplastin Time   Result Value Ref Range    PTT 27 22 - 30 sec   Magnesium Level   Result Value Ref Range    MAGNESIUM 1.8 1.7 - 2.4 mg/dL   Comprehensive Metabolic Panel   Result Value Ref Range    Sodium 142 135 - 145 mmol/L    Potassium 3.0 (L) 3.4 - 5.1 mmol/L    Chloride 105 98 - 107 mmol/L    Carbon Dioxide 25 21 - 32 mmol/L    Anion Gap 15 10 - 20 mmol/L    Glucose 121 (H) 65 - 99 mg/dL    BUN 7 6 - 20 mg/dL    Creatinine 0.66 0.51 - 0.95 mg/dL    GFR Estimate,  >90     GFR Estimate, Non African American >90     BUN/Creatinine Ratio 11 7 - 25    CALCIUM 9.5 8.4 - 10.2 mg/dL    TOTAL BILIRUBIN 0.1 (L) 0.2 - 1.0 mg/dL    AST/SGOT 20 <38 Units/L    ALT/SGPT 21 <79 Units/L    ALK PHOSPHATASE 92 45 - 117 Units/L    TOTAL PROTEIN 8.1 6.4 - 8.2 g/dL    Albumin 3.9 3.6 - 5.1 g/dL    GLOBULIN 4.2 (H) 2.0 - 4.0 g/dL    A/G Ratio, Serum 0.9 (L) 1.0 - 2.4   Lipase Level   Result Value Ref Range    Lipase 162 73 - 393 Units/L   Troponin I Ultra Sensitive   Result Value Ref Range    TROPONIN I <0.02 <0.05 ng/mL   B Type Natriuretic Peptide BNP   Result Value  Ref Range    B-TYPE NATRIURETIC PEPTIDE 9 <100 pg/mL   Troponin I Ultra Sensitive   Result Value Ref Range    TROPONIN I <0.02 <0.05 ng/mL       EKG Results     EKG Interpretation  Rate: 86  Rhythm: normal sinus rhythm   Abnormality: yes; T wave inversions to inferior leads and lateral leads, no ST depressions, no ST elevations     EKG interpreted by ED physician    EKG Interpretation  Rate: 73  Rhythm: normal sinus rhythm   Abnormality: yes; T wave inversions to inferior leads and lateral leads, no ST depressions, no ST elevations, no dynamic changes from initial    EKG interpreted by ED physician    Radiology Results     Imaging Results          CT ANGIOGRAM CHEST ABDOMEN (Final result)  Result time 08/24/17 19:09:28    Final result                 Impression:    IMPRESSION:   1. No evidence for aortic aneurysm or dissection.  2. No central pulmonary emboli.               Narrative:    CT ANGIOGRAM CHEST ABDOMEN W CONTRAST, 8/24/2017    HISTORY: Right chest and scapular pain    COMPARISON: None    TECHNIQUE: CT angiogram chest and abdomen with intravenous contrast.  Patient was injected with 80 cc of Isovue-370 intravenously. Aortic  dissection protocol is used. 2-D multiplanar and 3-D imaging is assessed.    FINDINGS: Excellent opacification of the arterial structures and aorta can  be noted. There is no evidence for aortic aneurysm or dissection. Pulmonary  arteries are not optimally opacified however no central or proximal  branches of the pulmonary arteries show emboli. There is no significant  pulmonary opacification. No pleural or pericardial effusion is seen. No  evidence for mediastinal or hilar lymphadenopathy is identified. Celiac and  SMA arteries are widely patent. Bilateral renal arteries are patent. GINA is  opacified. Common iliac arteries are of normal caliber.                               XR CHEST AP OR PA - PORTABLE (Final result)  Result time 08/24/17 17:42:56    Final result                  Impression:    IMPRESSION: No significant abnormality.               Narrative:    XR CHEST AP OR PA, 8/24/2017    HISTORY: Chest pain    COMPARISON: 11/11/2016    TECHNIQUE: Single view chest obtained    FINDINGS: Cardiac silhouette and pulmonary vessels are appropriate. Lungs  are free of infiltrate. There is no pleural effusion or pneumothorax.  Mediastinal and hilar contours are unremarkable.                                ED Medication Orders     Start Ordered     Status Ordering Provider    08/24/17 1757 08/24/17 1756  potassium chloride (K-DUR,KLOR-CON) CR tablet 40 mEq  ONCE      Last MAR action:  Given ANNY HUNTER    08/24/17 1737 08/24/17 1736  sodium chloride (NORMAL SALINE) 0.9 % bolus 1,000 mL  ONCE      Last MAR action:  Completed ANNY HUNTER    08/24/17 1736 08/24/17 1735  morphine injection 4 mg  ONCE      Last MAR action:  Given ANNY HUNTER    08/24/17 1711 08/24/17 1710  aspirin chewable 324 mg  ONCE      Last MAR action:  Given ANNY HUNTER          [facility]   Differential diagnosis is rather broad given the atypical nature of the patient's pain and includes ACS versus pulmonary embolus versus aortic dissection versus pleurisy versus musculoskeletal chest/back pain. Low suspicion for pneumonia versus GERD. EKG demonstrates T wave inversions in the inferior lateral leads with no prior comparison. Will administer aspirin now, obtain a chest x-ray, labs as ordered and a CT angiogram of the chest abdomen and pelvis.    CBC is significant for mild leukocytosis and mild anemia. Coags within normal limits. Magnesium within normal limits. CMP is significant for mild hypokalemia, will replete orally. Lipase is negative. Initial troponin is negative. BNP is negative. CT angiogram of the chest abdomen and pelvis demonstrates no evidence of aortic dissection or aneurysm additionally no evidence of pulmonary emboli. A repeat EKG was performed that demonstrates identical T-wave inversions  compared to prior without dynamic changes. A repeat troponin was also negative. The patient was informed that her risk of a MACE was < 1.7% and she feels comfortable with this. Patient is to follow-up with her PCP Dr. Gallardo in approximately 2-3 days for reevaluation of chest pain. The patient was discharged with a prescription for acetaminophen per      HEART Score for Major Cardiac Events  History: Slightly suspicious   EKG Non Specific repolarization disturbance   AGE Less than or equal to 45   RISK FACTOR 1-2 risk factors   TROPONIN: Equal or less than normal limit   TOTAL SCORE 2     Additional Information    Low Risk HEART Score Results:   Discussion and Patient Decision:     The patient's HEART Score is considered to be at low risk for a Major Adverse Cardiac Event (MACE).  The calculated risk is 1.7 % at 6 weeks.    The results of the work up were discussed with patient including the limitations of a single troponin and single ECG assessment.  The patient was offered admission to an ED observation status for serial troponins and serial ECG assessments.  After discussing the risks and benefits, the patient agreed to remain in the ED for collection of a repeat troponin and ECG in two hours to decrease their potential risk.           Clinical Impression     ED Diagnosis   1. Chest pain, unspecified type         Disposition        Discharge 8/24/2017  8:38 PM  [facility] discharge to home/self care.                    Dago Rojas MD  08/24/17 [id number]

## 2019-06-28 ENCOUNTER — OFFICE VISIT (OUTPATIENT)
Dept: INTERNAL MEDICINE | Facility: CLINIC | Age: 84
End: 2019-06-28

## 2019-06-28 VITALS
HEART RATE: 71 BPM | RESPIRATION RATE: 16 BRPM | BODY MASS INDEX: 23.95 KG/M2 | DIASTOLIC BLOOD PRESSURE: 64 MMHG | WEIGHT: 176.8 LBS | OXYGEN SATURATION: 98 % | HEIGHT: 72 IN | SYSTOLIC BLOOD PRESSURE: 128 MMHG | TEMPERATURE: 99.2 F

## 2019-06-28 DIAGNOSIS — M25.511 ACUTE PAIN OF RIGHT SHOULDER: Primary | ICD-10-CM

## 2019-06-28 RX ORDER — METHYLPREDNISOLONE ACETATE 80 MG/ML
80 INJECTION, SUSPENSION INTRA-ARTICULAR; INTRALESIONAL; INTRAMUSCULAR; SOFT TISSUE
Status: DISCONTINUED | OUTPATIENT
Start: 2019-06-28 | End: 2019-06-28 | Stop reason: HOSPADM

## 2019-06-28 RX ADMIN — METHYLPREDNISOLONE ACETATE 80 MG: 80 INJECTION, SUSPENSION INTRA-ARTICULAR; INTRALESIONAL; INTRAMUSCULAR; SOFT TISSUE at 16:11

## 2019-06-28 NOTE — PROGRESS NOTES
Subjective   Jim Romero is a 88 y.o. male.     Chief Complaint   Patient presents with   • Shoulder Injury         Shoulder Injury    The incident occurred at home. The right shoulder is affected. The incident occurred more than 1 week ago. The injury mechanism was repetitive motion. The quality of the pain is described as aching. The pain is moderate. The symptoms are aggravated by movement and overhead lifting. He has tried acetaminophen for the symptoms. The treatment provided no relief.        The following portions of the patient's history were reviewed and updated as appropriate: allergies, current medications, past social history and problem list.    Outpatient Medications Marked as Taking for the 6/28/19 encounter (Office Visit) with Reji Gilman MD   Medication Sig Dispense Refill   • metFORMIN (GLUCOPHAGE) 500 MG tablet Take 1 tablet twice daily 180 tablet 1   • pantoprazole (PROTONIX) 40 MG EC tablet TAKE ONE TABLET BY MOUTH DAILY 90 tablet 0   • pramipexole (MIRAPEX) 0.5 MG tablet TAKE ONE TABLET BY MOUTH TWICE A  tablet 0   • predniSONE (DELTASONE) 5 MG tablet Take 1 tablet by mouth Every Morning. 30 tablet 3   • tamsulosin (FLOMAX) 0.4 MG capsule 24 hr capsule TAKE ONE CAPSULE BY MOUTH TWICE A  capsule 0       Review of Systems   Constitutional: Negative for chills and fever.   Musculoskeletal: Positive for arthralgias. Negative for neck pain.       Objective   Vitals:    06/28/19 1253   BP: 128/64   Pulse: 71   Resp: 16   Temp: 99.2 °F (37.3 °C)   SpO2: 98%      Wt Readings from Last 3 Encounters:   06/28/19 80.2 kg (176 lb 12.8 oz)   06/25/19 80.6 kg (177 lb 12.8 oz)   05/22/19 82.9 kg (182 lb 12.8 oz)    Body mass index is 23.97 kg/m².        Physical Exam   Constitutional: He appears well-developed and well-nourished.   Musculoskeletal: He exhibits no edema, tenderness or deformity.        Right shoulder: He exhibits decreased range of motion.         Problem List Items  Addressed This Visit     None      Visit Diagnoses     Acute pain of right shoulder    -  Primary        Assessment/Plan   In with 4 to 5 weeks of right shoulder pain.  Not sure how he hurt it.  Clearly exacerbated it after a round of golf.  Played a second round and seem to make it worse.  Had trouble with the left shoulder many years ago.  He cleared up with a shot of cortisone and some physical therapy.  He is got some mild decreased range of motion.  Some impingement signs on rotation testing.  No localized tenderness.  Set up for physical therapy.  He is currently on 5 mg of prednisone for his PMR.  We will inject with 80 mg Depo-Medrol in the subdeltoid bursa to respond to PT.      Injection Tendon or Ligament  Date/Time: 6/28/2019 4:11 PM  Performed by: Reji Gilman MD  Authorized by: Reji Gilman MD   Preparation: Patient was prepped and draped in the usual sterile fashion.  Local anesthesia used: yes  Anesthesia: local infiltration    Anesthesia:  Local anesthesia used: yes  Local Anesthetic: lidocaine 1% without epinephrine    Sedation:  Patient sedated: no    Patient tolerance: Patient tolerated the procedure well with no immediate complications  Comments: 2 cc of lidocaine 1% without epi instilled with the Depo-Medrol.  Lot number [id number].  Expiration 3/2020.  NDC number 65186-601-16.  Medications administered: 80 mg methylPREDNISolone acetate 80 MG/ML                   Dragon disclaimer:   Much of this encounter note is an electronic transcription/translation of spoken language to printed text. The electronic translation of spoken language may permit erroneous, or at times, nonsensical words or phrases to be inadvertently transcribed; Although I have reviewed the note for such errors, some may still exist.

## 2019-07-09 RX ORDER — PRAMIPEXOLE DIHYDROCHLORIDE 0.5 MG/1
TABLET ORAL
Qty: 180 TABLET | Refills: 0 | Status: SHIPPED | OUTPATIENT
Start: 2019-07-09 | End: 2019-09-01 | Stop reason: SDUPTHER

## 2019-07-11 ENCOUNTER — HOSPITAL ENCOUNTER (OUTPATIENT)
Dept: PHYSICAL THERAPY | Facility: HOSPITAL | Age: 84
Setting detail: THERAPIES SERIES
Discharge: HOME | End: 2019-07-11

## 2019-07-11 DIAGNOSIS — R29.3 POOR POSTURE: ICD-10-CM

## 2019-07-11 DIAGNOSIS — M25.511 ACUTE PAIN OF RIGHT SHOULDER: Primary | ICD-10-CM

## 2019-07-11 PROCEDURE — 97162 PT EVAL MOD COMPLEX 30 MIN: CPT

## 2019-07-11 PROCEDURE — 97110 THERAPEUTIC EXERCISES: CPT

## 2019-07-11 NOTE — THERAPY EVALUATION
"    Outpatient Physical Therapy Ortho Initial Evaluation  [facility]     Patient Name: Jim Romero  : 3/17/1931  MRN: [id number]  Today's Date: 2019      Visit Date: 2019    Patient Active Problem List   Diagnosis   • RLS (restless legs syndrome)   • BPH (benign prostatic hyperplasia)   • GERD (gastroesophageal reflux disease)   • Sciatica   • Chronic fatigue   • IGT (impaired glucose tolerance)   • PMR (polymyalgia rheumatica) (CMS/[facility])   • Osteoporosis        Past Medical History:   Diagnosis Date   • Benign prostatic hyperplasia    • GERD (gastroesophageal reflux disease)    • Medication management    • RLS (restless legs syndrome)    • Sciatica         No past surgical history on file.    Visit Dx:     ICD-10-CM ICD-9-CM   1. Acute pain of right shoulder M25.511 719.41   2. Poor posture R29.3 781.92         Patient History     Row Name 19 1500             History    Chief Complaint  Pain  -CA      Type of Pain  Shoulder pain  -CA      Brief Description of Current Complaint  Jim Romero is a 88 y.o. male who presents today with R shoulder pain. He reports onset of pain approximately 2 months ago while playing golf and \"swinging too hard\". He reports pain was exacerbated by lifting heavy luggage and continuing to play golf. He went to see Dr. Gilman approximately 2 weeks ago, at which time he received a cortisone injection, which has eliminated approximately 80% of his pain. Pt reports difficulty lifting a gallon jug of milk, swinging golf club, heavy luggage (pt works as ), reaching overhead, donning/doffing tshirt. Pt reports pain relief with heat, cortisone injection. Pt denies n/t. PMH includes previous PT episode for L shoulder pain, cervical fusion.   -CA      Previous treatment for THIS PROBLEM  Injections  -CA      Occupation/sports/leisure activities    -CA         Pain     Pain Location  Shoulder  -CA      Pain at Present  0  -CA      Pain at Best  0  -CA   "    Pain at Worst  4  -CA      Is your sleep disturbed?  No  -CA         Daily Activities    Primary Language  English  -CA      How does patient learn best?  Listening;Reading;Demonstration  -CA      Teaching needs identified  Home Exercise Program;Management of Condition  -CA      Patient is concerned about/has problems with  Reaching over head;Performing sports, recreation, and play activities;Repetitive movements of the hand, arm, shoulder  -CA      Does patient have problems with the following?  None  -CA      Barriers to learning  None  -CA      Pt Participated in POC and Goals  Yes  -CA         Safety    Are you being hurt, hit, or frightened by anyone at home or in your life?  No  -CA      Are you being neglected by a caregiver  No  -CA        User Key  (r) = Recorded By, (t) = Taken By, (c) = Cosigned By    Initials Name Provider Type    Selina Garcia PT Physical Therapist          PT Ortho     Row Name 07/11/19 1500       Posture/Observations    Posture/Observations Comments  fwd head, rounded shoulders, L shoulder elevation  -CA       Shoulder Girdle Accessory Motions    Posterior glide of humerus  Right:;Hypomobile  -CA    Inferior glide of humerus  Right:;Hypomobile  -CA       Shoulder Impingement/Rotator Cuff Special Tests    Silverio-Kain Test (RC Lesion vs. Bursitis)  Positive;Right:  -CA    Neer Impingement Test (RC Lesion vs. Bursitis)  Positive;Right:  -CA    Empty Can Test (RC Lesion)  Positive;Right:  -CA    Drop Arm Test (Full Thickness RC Lesion)  Positive;Right:  -CA       Shoulder Girdle Palpation    Shoulder Girdle Palpation?  No Abnormality/Tenderness  -CA       Right Upper Ext    Rt Shoulder Flexion AROM  140  -CA    Rt Upper Extremity Comments   FIR = T12; KELVIN = T3  -CA       Left Upper Ext    Lt Shoulder Flexion AROM  165  -CA    Lt Upper Extremity Comments   FIR = T6; KELVIN = T3  -CA       MMT Right Upper Ext    Rt Shoulder Flexion MMT, Gross Movement  (4/5) good  -CA    Rt  Shoulder ABduction MMT, Gross Movement  (4/5) good  -CA    Rt Shoulder Internal Rotation MMT, Gross Movement  (4+/5) good plus  -CA    Rt Shoulder External Rotation MMT, Gross Movement  (3+/5) fair plus  -CA       MMT Left Upper Ext    Lt Shoulder Flexion MMT, Gross Movement  (3+/5) fair plus  -CA    Lt Shoulder ABduction MMT, Gross Movement  (3+/5) fair plus  -CA    Lt Shoulder Internal Rotation MMT, Gross Movement  (4/5) good  -CA    Lt Shoulder External Rotation MMT, Gross Movement  (3+/5) fair plus  -CA       Sensation    Sensation WNL?  WNL  -CA      User Key  (r) = Recorded By, (t) = Taken By, (c) = Cosigned By    Initials Name Provider Type    Selina Garcia, PT Physical Therapist                      Therapy Education  Education Details: Eval findings, activity modification, PT POC, shoulder anatomy  Given: HEP, Symptoms/condition management, Pain management, Posture/body mechanics  Program: New  How Provided: Verbal, Demonstration, Written  Provided to: Patient  Level of Understanding: Demonstrated, Verbalized     PT OP Goals     Row Name 07/11/19 1500          PT Short Term Goals    STG Date to Achieve  08/08/19  -CA     STG 1  Pt will be independent and verbalized good understanding of initial HEP to improve ability to manage pain, as well as improve strength and ROM.  -CA     STG 1 Progress  New  -CA     STG 2  Pt will improve R shoulder flexion to at least 165 deg to improve ability to reach overhead.   -CA     STG 2 Progress  New  -CA     STG 3  Pt will demo at least 4/5 R shouder strength to improve ability to reach overhead  -CA     STG 3 Progress  New  -CA        Long Term Goals    LTG Date to Achieve  09/05/19  -CA     LTG 1  Pt will be independent and verbalized good understanding of advanced HEP to improve ability to manage pain, as well as improve strength and ROM.  -CA     LTG 1 Progress  New  -CA     LTG 2  Pt will report </=1/10 pain in R shoulder with overhead activities to improve  participation in ADLs.   -CA     LTG 2 Progress  New  -CA     LTG 3  Pt will improve DASH score to at least <10% perceived disability to show overall improved quality of life.  -CA     LTG 3 Progress  New  -CA       User Key  (r) = Recorded By, (t) = Taken By, (c) = Cosigned By    Initials Name Provider Type    Selina Garcia, PT Physical Therapist          PT Assessment/Plan     Row Name 07/11/19 1500          PT Assessment    Functional Limitations  Performance in sport activities;Performance in self-care ADL;Performance in leisure activities;Limitations in functional capacity and performance;Limitation in home management  -CA     Impairments  Range of motion;Posture;Pain;Muscle strength;Joint mobility  -CA     Assessment Comments  Jim Romero is a 88 y.o. male referred to physical therapy for R shoulder pain. He presents with an evolving clinical presentation, along with no remarkable comorbidities and personal factors of hx of cervical surgery, and job that requires frequent/heavy lifting that may impact his progress in the plan of care. Pt presents today with poor posture, GH joint and thoracic hypomobility, decreased R shoulder ROM, R shoulder weakness, (+) HK, (+) Neer, (+) empty can, and (+) drop arm test, and pain . his signs and symptoms are consistent with a physical therapy diagnosis of RC lesion vs. impingement. The previous impairments limit his ability to reach overhead, perform ADLs, perform job duties, and play golf. Pt will benefit from skilled PT to address the previous impairments and return to PLOF.  -CA     Please refer to paper survey for additional self-reported information  Yes  -CA     Rehab Potential  Good  -CA     Patient/caregiver participated in establishment of treatment plan and goals  Yes  -CA     Patient would benefit from skilled therapy intervention  Yes  -CA        PT Plan    PT Frequency  2x/week  -CA     Predicted Duration of Therapy Intervention (Therapy Eval)  6-8  weeks  -CA     Planned CPT's?  PT EVAL MOD COMPLELITY: 34462;PT RE-EVAL: 46535;PT THER PROC EA 15 MIN: 13381;PT THER ACT EA 15 MIN: 93134;PT MANUAL THERAPY EA 15 MIN: 89943;PT NEUROMUSC RE-EDUCATION EA 15 MIN: 60222;PT SELF CARE/HOME MGMT/TRAIN EA 15: 17481;PT HOT OR COLD PACK TREAT MCARE;PT ELECTRICAL STIM UNATTEND: ;PT ULTRASOUND EA 15 MIN: 12656  -CA     PT Plan Comments  Next visit, consider warm up on UBE, PROM, GH/thoracic mobs, SL thoracic arc, doorway pec stretch, supine B ER, supine HA  -CA       User Key  (r) = Recorded By, (t) = Taken By, (c) = Cosigned By    Initials Name Provider Type    Selina Garcia, SHAJI Physical Therapist            Exercises     Row Name 07/11/19 1600             Total Minutes    62225 - PT Therapeutic Exercise Minutes  15  -CA         Exercise 1    Exercise Name 1  rev shoulder rolls  -CA      Cueing 1  Verbal  -CA      Reps 1  15  -CA         Exercise 2    Exercise Name 2  scap squeeze  -CA      Cueing 2  Verbal  -CA      Reps 2  15  -CA      Time 2  5s  -CA         Exercise 3    Exercise Name 3  row   -CA      Cueing 3  Verbal  -CA      Sets 3  2  -CA      Reps 3  10  -CA      Time 3  rtb  -CA      Additional Comments  cues for scap engagement  -CA        User Key  (r) = Recorded By, (t) = Taken By, (c) = Cosigned By    Initials Name Provider Type    Selina Garcia, SHAJI Physical Therapist                        Outcome Measure Options: Disabilities of the Arm, Shoulder, and Hand (DASH)  DASH  Open a tight or new jar.: No Difficulty  Write: No Difficulty  Turn a key: Mild Difficulty  Prepare a meal: No Difficulty  Push open a heavy door: Mild Difficulty  Place an object on a shelf above your head: Moderate Difficulty  Do heavy household chores (e.g., wash walls, wash floors): Moderate Difficulty  Garden or do yard work: Moderate Difficulty  Make a bed: Mild Difficulty  Carry a shopping bag or briefcase: Mild Difficulty  Carry a heavy object (over 10 lbs): Moderate  Difficulty  Change a lightbulb overhead: Moderate Difficulty  Wash or blow dry your hair: Mild Difficulty  Wash your back: Mild Difficulty  Put on a pullover sweater: Moderate Difficulty  Use a knife to cut food: Mild Difficulty  Recreational activities in which require little effort (e.g., cardplaying, knitting, etc.): No Difficulty  Recreational activities in which you take some force or impact through your arm, should or hand (e.g. golf, hammering, tennis, etc.): Severe Difficulty  Recreational Activities in which you move your arm freely (e.g., frisbee, badminton, etc.): Moderate Difficulty  Manage transportation needs (getting from one place to another): No Difficulty  Sexual Activities: No Difficulty  During the past week, to what extent has your arm, shoulder, or hand problem interfered with your normal social activites with family, friends, neighbors or groups?: Quite a bit  During the past week, were you limited in your work or other regular daily activities as a result of your arm, shoulder or hand problem?: Very limited  Arm, Shoulder, or hand pain: Moderate  Arm, shoulder or hand pain when you performed any specific activity: Moderate  Tingling (pins and needles) in your arm, shoulder, or hand: None  Weakness in your arm, shoulder or hand: Mild  Stiffness in your arm, shoulder or hand: Moderate  During the past week, how much difficulty have you had sleeping because of the pain in your arm, shoulder or hand?: No difficulty  I feel less capable, less confident or less useful because of my arm, shoulder or hand problem: Neither Agree nor Disagree  DASH Sum : 69  Number of Questions Answered: 30  DASH Score: 32.5         Time Calculation:     Start Time: 1525  Stop Time: 1605  Time Calculation (min): 40 min  Total Timed Code Minutes- PT: 15 minute(s)     Therapy Charges for Today     Code Description Service Date Service Provider Modifiers Qty    99835399635 HC PT THER PROC EA 15 MIN 7/11/2019 Phil  Selina, PT GP 1    [id number]  PT EVAL MOD COMPLEXITY 2 7/11/2019 Selina Villarreal, PT GP 1          PT G-Codes  Outcome Measure Options: Disabilities of the Arm, Shoulder, and Hand (DASH)         Selina Villarreal, PT  7/11/2019

## 2019-07-15 ENCOUNTER — OFFICE VISIT (OUTPATIENT)
Dept: INTERNAL MEDICINE | Facility: CLINIC | Age: 84
End: 2019-07-15

## 2019-07-15 VITALS
HEART RATE: 72 BPM | SYSTOLIC BLOOD PRESSURE: 120 MMHG | WEIGHT: 177 LBS | BODY MASS INDEX: 23.98 KG/M2 | DIASTOLIC BLOOD PRESSURE: 70 MMHG | TEMPERATURE: 99.2 F | RESPIRATION RATE: 16 BRPM | HEIGHT: 72 IN | OXYGEN SATURATION: 99 %

## 2019-07-15 DIAGNOSIS — M35.3 PMR (POLYMYALGIA RHEUMATICA) (HCC): ICD-10-CM

## 2019-07-15 DIAGNOSIS — R73.02 IGT (IMPAIRED GLUCOSE TOLERANCE): Primary | ICD-10-CM

## 2019-07-15 PROCEDURE — 99213 OFFICE O/P EST LOW 20 MIN: CPT | Performed by: INTERNAL MEDICINE

## 2019-07-15 RX ORDER — PREDNISONE 1 MG/1
4 TABLET ORAL EVERY MORNING
Qty: 240 TABLET | Refills: 1 | Status: SHIPPED | OUTPATIENT
Start: 2019-07-15 | End: 2019-09-10 | Stop reason: SDUPTHER

## 2019-07-15 NOTE — PROGRESS NOTES
Subjective   Jim Romero is a 88 y.o. male.     Chief Complaint   Patient presents with   • Hyperglycemia   • Polymyalgia Rheumatica         Hyperglycemia   Associated symptoms include myalgias. Pertinent negatives include no chills or fatigue.   Pain   This is a chronic problem. The current episode started more than 1 month ago. The problem has been unchanged. Associated symptoms include myalgias. Pertinent negatives include no chills or fatigue.   Arthritis   Presents for follow-up visit. The symptoms have been stable. Affected locations include the right knee, left knee, left DIP and right hip. Pertinent negatives include no diarrhea, dysuria or fatigue.        The following portions of the patient's history were reviewed and updated as appropriate: allergies, current medications, past social history and problem list.    Outpatient Medications Marked as Taking for the 7/15/19 encounter (Office Visit) with Reji Gilman MD   Medication Sig Dispense Refill   • metFORMIN (GLUCOPHAGE) 500 MG tablet Take 1 tablet twice daily 180 tablet 1   • pantoprazole (PROTONIX) 40 MG EC tablet TAKE ONE TABLET BY MOUTH DAILY 90 tablet 0   • pramipexole (MIRAPEX) 0.5 MG tablet TAKE ONE TABLET BY MOUTH TWICE A  tablet 0   • predniSONE (DELTASONE) 5 MG tablet Take 1 tablet by mouth Every Morning. 30 tablet 3   • tamsulosin (FLOMAX) 0.4 MG capsule 24 hr capsule TAKE ONE CAPSULE BY MOUTH TWICE A  capsule 0       Review of Systems   Constitutional: Negative for chills and fatigue.   Gastrointestinal: Negative for diarrhea.   Genitourinary: Negative for dysuria.   Musculoskeletal: Positive for arthritis, back pain and myalgias.       Objective   Vitals:    07/15/19 1524   BP: 120/70   Pulse: 72   Resp: 16   Temp: 99.2 °F (37.3 °C)   SpO2: 99%          07/15/19  1524   Weight: 80.3 kg (177 lb)    [unfilled]  Body mass index is 24 kg/m².      Physical Exam   Constitutional: He appears well-developed and well-nourished.    Musculoskeletal: Normal range of motion. He exhibits no edema, tenderness or deformity.   Skin: Skin is warm and dry.         Problem List Items Addressed This Visit        Endocrine    IGT (impaired glucose tolerance) - Primary    Relevant Orders    Hemoglobin A1c    Glucose, Plasma ([facility])       Nervous and Auditory    PMR (polymyalgia rheumatica) (CMS/[facility])    Relevant Orders    Sedimentation Rate    C-reactive Protein        Assessment/Plan   In today for recheck of PMR.  Ache the lower back, hips, knees, thighs.  He has trouble getting up and down from a seated position.  Trouble walking and bending over.  He states he was stiff and achy but that has cleared up.  No jaw claudication.  Some minor symptoms in the shoulder girdle.  Polymyalgia rheumatica would be the primary concern.  He had been on prednisone 7.5 mg daily for the past 10 weeks.  CRP was elevated.  Sedimentation rate was only minimally elevated.  Has started on Prolia.  Added caltrate D.  On metformin now.  Sed rate, CRP, glucose, A1c today.  Currently is asymptomatic on 5 mg of prednisone.  Will recheck again in 8 weeks.  Cut prednisone to 4 mg daily today.  He started on prednisone on July 9, 2018.           .[email or web address]  Dragon disclaimer:   Much of this encounter note is an electronic transcription/translation of spoken language to printed text. The electronic translation of spoken language may permit erroneous, or at times, nonsensical words or phrases to be inadvertently transcribed; Although I have reviewed the note for such errors, some may still exist.

## 2019-07-16 ENCOUNTER — HOSPITAL ENCOUNTER (OUTPATIENT)
Dept: PHYSICAL THERAPY | Facility: HOSPITAL | Age: 84
Setting detail: THERAPIES SERIES
Discharge: HOME | End: 2019-07-16

## 2019-07-16 DIAGNOSIS — M25.511 ACUTE PAIN OF RIGHT SHOULDER: Primary | ICD-10-CM

## 2019-07-16 DIAGNOSIS — R29.3 POOR POSTURE: ICD-10-CM

## 2019-07-16 LAB
CRP SERPL-MCNC: 0.04 MG/DL (ref 0–0.5)
ERYTHROCYTE [SEDIMENTATION RATE] IN BLOOD BY WESTERGREN METHOD: 3 MM/HR (ref 0–20)
GLUCOSE P FAST SERPL-MCNC: 115 MG/DL (ref 74–106)
HBA1C MFR BLD: 6.6 % (ref 4.8–5.6)

## 2019-07-16 PROCEDURE — 97140 MANUAL THERAPY 1/> REGIONS: CPT

## 2019-07-16 PROCEDURE — 97110 THERAPEUTIC EXERCISES: CPT

## 2019-07-16 NOTE — THERAPY TREATMENT NOTE
Outpatient Physical Therapy Ortho Treatment Note  [facility]     Patient Name: Jim Romero  : 3/17/1931  MRN: [id number]  Today's Date: 2019      Visit Date: 2019    Visit Dx:    ICD-10-CM ICD-9-CM   1. Acute pain of right shoulder M25.511 719.41   2. Poor posture R29.3 781.92       Patient Active Problem List   Diagnosis   • RLS (restless legs syndrome)   • BPH (benign prostatic hyperplasia)   • GERD (gastroesophageal reflux disease)   • Sciatica   • Chronic fatigue   • IGT (impaired glucose tolerance)   • PMR (polymyalgia rheumatica) (CMS/[facility])   • Osteoporosis        Past Medical History:   Diagnosis Date   • Benign prostatic hyperplasia    • GERD (gastroesophageal reflux disease)    • Medication management    • RLS (restless legs syndrome)    • Sciatica         No past surgical history on file.                    PT Assessment/Plan     Row Name 19 1600          PT Assessment    Assessment Comments  Mr. Romero returns for first follow up visit after initial eval with reports of no significant changes. Progressed therex program to include rotator cuff strengthening and manual therapy for improved scapulothoracic/ GH joint mobility with good tolerance. Pt reports decreased pain at end of session. He continues to be a good candidat for skilled PT.  -RS        PT Plan    PT Plan Comments  Assess tolerance for therex progression, update HEP, increase repeptition of RC strengthening  -RS       User Key  (r) = Recorded By, (t) = Taken By, (c) = Cosigned By    Initials Name Provider Type    RS Flor Richards, PT Physical Therapist            Exercises     Row Name 19 1500             Subjective Comments    Subjective Comments  No changes, has increased pain when reaching behind back.  -RS         Subjective Pain    Able to rate subjective pain?  yes  -RS      Pre-Treatment Pain Level  3  -RS         Total Minutes    59791 - PT Therapeutic Exercise Minutes  27  -RS      08376 - PT  Manual Therapy Minutes  15  -RS      73684 - OT Manual Therapy Minutes  --  -RS         Exercise 1    Exercise Name 1  rev shoulder rolls  -RS      Cueing 1  Verbal  -RS      Reps 1  15  -RS         Exercise 2    Exercise Name 2  scap clock  -RS      Cueing 2  Verbal  -RS      Reps 2  5 ea CW/CCW  -RS      Time 2  --  -RS         Exercise 3    Exercise Name 3  row   -RS      Cueing 3  Verbal  -RS      Sets 3  2  -RS      Reps 3  10  -RS      Time 3  rtb  -RS      Additional Comments  cues for scap engagement  -RS         Exercise 4    Exercise Name 4  UBE  -RS      Cueing 4  Verbal  -RS      Time 4  4 min  -RS      Additional Comments  L1  -RS         Exercise 5    Exercise Name 5  shoulder extension with band  -RS      Cueing 5  Verbal  -RS      Reps 5  15  -RS      Additional Comments  RTB  -RS         Exercise 6    Exercise Name 6  doorway stretch low  -RS      Cueing 6  Verbal  -RS      Reps 6  3  -RS      Time 6  20s  -RS      Additional Comments  gentle  -RS         Exercise 7    Exercise Name 7  sidelying shoulder ER  -RS      Cueing 7  Verbal  -RS      Reps 7  10  -RS      Additional Comments  towel roll under arm  -RS         Exercise 8    Exercise Name 8  supine B shoulder ER  -RS      Cueing 8  Verbal  -RS      Reps 8  10  -RS      Additional Comments  RTB  -RS         Exercise 9    Exercise Name 9  supine shoulder HA  -RS      Cueing 9  Verbal  -RS      Reps 9  10  -RS      Additional Comments  RTB  -RS         Exercise 10    Exercise Name 10  sidelying thoracic rotation modified- arm bent (bow and arrow)  -RS      Cueing 10  Verbal;Demo  -RS      Reps 10  5 ea  -RS      Time 10  5s  -RS         Exercise 11    Exercise Name 11  sidelying scapular D2 pattern with maual resistance  -RS      Cueing 11  Verbal;Tactile  -RS      Reps 11  8 each  -RS      Additional Comments  slow reversal  -RS        User Key  (r) = Recorded By, (t) = Taken By, (c) = Cosigned By    Initials Name Provider Type    RS Susie  Flor, PT Physical Therapist                      Manual Rx (last 36 hours)      Manual Treatments     Row Name 07/16/19 1500             Total Minutes    10061 - PT Manual Therapy Minutes  15  -RS      32172 - OT Manual Therapy Minutes  --  -RS         Manual Rx 1    Manual Rx 1 Location  R shoulder  -RS      Manual Rx 1 Type  posterior GH mobs, inferior distract  -RS      Manual Rx 1 Grade  III  -RS         Manual Rx 2    Manual Rx 2 Location  R sub scap/ supraspinatus/infraspinatus  -RS      Manual Rx 2 Type  STM  -RS         Manual Rx 3    Manual Rx 3 Location  scapulothoracic mobs  -RS      Manual Rx 3 Type  down and back  -RS        User Key  (r) = Recorded By, (t) = Taken By, (c) = Cosigned By    Initials Name Provider Type    RS Flor Richards, PT Physical Therapist          PT OP Goals     Row Name 07/16/19 1500          PT Short Term Goals    STG Date to Achieve  08/08/19  -RS     STG 1  Pt will be independent and verbalized good understanding of initial HEP to improve ability to manage pain, as well as improve strength and ROM.  -RS     STG 1 Progress  Ongoing  -RS     STG 2  Pt will improve R shoulder flexion to at least 165 deg to improve ability to reach overhead.   -RS     STG 2 Progress  Ongoing  -RS     STG 3  Pt will demo at least 4/5 R shouder strength to improve ability to reach overhead  -RS     STG 3 Progress  Ongoing  -RS        Long Term Goals    LTG Date to Achieve  09/05/19  -RS     LTG 1  Pt will be independent and verbalized good understanding of advanced HEP to improve ability to manage pain, as well as improve strength and ROM.  -RS     LTG 1 Progress  Ongoing  -RS     LTG 2  Pt will report </=1/10 pain in R shoulder with overhead activities to improve participation in ADLs.   -RS     LTG 2 Progress  Ongoing  -RS     LTG 3  Pt will improve DASH score to at least <10% perceived disability to show overall improved quality of life.  -RS     LTG 3 Progress  Ongoing  -RS       User Key   (r) = Recorded By, (t) = Taken By, (c) = Cosigned By    Initials Name Provider Type    RS Flor Richards, PT Physical Therapist          Therapy Education  Education Details: exercise rationale, HEP, activity recommendation  Program: Reinforced  How Provided: Verbal  Provided to: Patient  Level of Understanding: Verbalized              Time Calculation:   Start Time: 1530  Stop Time: 1615  Time Calculation (min): 45 min  Therapy Charges for Today     Code Description Service Date Service Provider Modifiers Qty    45700813554  PT MANUAL THERAPY EA 15 MIN 7/16/2019 Flor Richards, PT GP 1    32696241994 HC PT THER PROC EA 15 MIN 7/16/2019 Flor Richards, PT GP 2                    Flor Richards PT  7/16/2019

## 2019-07-18 ENCOUNTER — HOSPITAL ENCOUNTER (OUTPATIENT)
Dept: PHYSICAL THERAPY | Facility: HOSPITAL | Age: 84
Setting detail: THERAPIES SERIES
Discharge: HOME | End: 2019-07-18

## 2019-07-18 DIAGNOSIS — R29.3 POOR POSTURE: ICD-10-CM

## 2019-07-18 DIAGNOSIS — M25.511 ACUTE PAIN OF RIGHT SHOULDER: Primary | ICD-10-CM

## 2019-07-18 PROCEDURE — 97140 MANUAL THERAPY 1/> REGIONS: CPT

## 2019-07-18 PROCEDURE — 97110 THERAPEUTIC EXERCISES: CPT

## 2019-07-18 NOTE — THERAPY TREATMENT NOTE
Outpatient Physical Therapy Ortho Treatment Note  [facility]     Patient Name: Jim Romero  : 3/17/1931  MRN: [id number]  Today's Date: 2019      Visit Date: 2019    Visit Dx:    ICD-10-CM ICD-9-CM   1. Acute pain of right shoulder M25.511 719.41   2. Poor posture R29.3 781.92       Patient Active Problem List   Diagnosis   • RLS (restless legs syndrome)   • BPH (benign prostatic hyperplasia)   • GERD (gastroesophageal reflux disease)   • Sciatica   • Chronic fatigue   • IGT (impaired glucose tolerance)   • PMR (polymyalgia rheumatica) (CMS/[facility])   • Osteoporosis        Past Medical History:   Diagnosis Date   • Benign prostatic hyperplasia    • GERD (gastroesophageal reflux disease)    • Medication management    • RLS (restless legs syndrome)    • Sciatica         No past surgical history on file.                    PT Assessment/Plan     Row Name 19 1800          PT Assessment    Assessment Comments  The pt tolerates therex progression to include wall wash and supine D2 well withotu reports of increased pain. He reports decreased pain after manaul therapy, specifically during post/inf GH mobilizations. He continues to be a good candidate for skilled PT. Advised pt to continue holding off on golf performance until pain, strength, and mobility have improved.  -RS        PT Plan    PT Plan Comments  Assess tolerance for therex progression, consider D1 flexion, wash wall in PNF patterns as tolerance allows  -RS       User Key  (r) = Recorded By, (t) = Taken By, (c) = Cosigned By    Initials Name Provider Type    RS Flor Richards, PT Physical Therapist            Exercises     Row Name 19 1700             Subjective Comments    Subjective Comments  The pt reports he was a liltte sore sleeping last night sleeping ut nothing much during the day.  -RS         Subjective Pain    Able to rate subjective pain?  yes  -RS      Pre-Treatment Pain Level  2  -RS         Total Minutes    89045  - PT Therapeutic Exercise Minutes  30  -RS      60851 - PT Manual Therapy Minutes  10  -RS         Exercise 1    Exercise Name 1  rev shoulder rolls  -RS      Cueing 1  Verbal  -RS      Reps 1  15  -RS         Exercise 2    Exercise Name 2  scap clock  -RS      Cueing 2  Verbal  -RS      Reps 2  5 ea CW/CCW  -RS         Exercise 3    Exercise Name 3  row   -RS      Cueing 3  Verbal  -RS      Sets 3  2  -RS      Reps 3  10  -RS      Time 3  rtb  -RS      Additional Comments  cues for scap engagement  -RS         Exercise 4    Exercise Name 4  UBE  -RS      Cueing 4  Verbal  -RS      Time 4  4 min  -RS      Additional Comments  L1  -RS         Exercise 5    Exercise Name 5  shoulder extension with band  -RS      Cueing 5  Verbal  -RS      Reps 5  15  -RS      Additional Comments  RTB  -RS         Exercise 6    Exercise Name 6  doorway stretch low  -RS      Cueing 6  Verbal  -RS      Reps 6  3  -RS      Time 6  20s  -RS      Additional Comments  gentle  -RS         Exercise 7    Exercise Name 7  sidelying shoulder ER  -RS      Cueing 7  Verbal  -RS      Sets 7  2  -RS      Reps 7  10  -RS      Additional Comments  1#  -RS         Exercise 8    Exercise Name 8  supine B shoulder ER  -RS      Cueing 8  Verbal  -RS      Sets 8  2  -RS      Reps 8  10  -RS      Additional Comments  RTB  -RS         Exercise 9    Exercise Name 9  supine shoulder HA  -RS      Cueing 9  Verbal  -RS      Sets 9  2  -RS      Reps 9  10  -RS      Additional Comments  RTB  -RS         Exercise 10    Exercise Name 10  sidelying thoracic rotation modified- arm bent (bow and arrow)  -RS      Cueing 10  Verbal;Demo  -RS      Reps 10  5 ea  -RS      Time 10  5s  -RS         Exercise 11    Exercise Name 11  sidelying scapular D2 pattern with maual resistance  -RS      Cueing 11  Verbal;Tactile  -RS      Reps 11  8 each  -RS      Additional Comments  slow reversal  -RS         Exercise 12    Exercise Name 12  wall wash  -RS      Cueing 12  Verbal  -RS       Reps 12  10  -RS      Time 12  5s  -RS         Exercise 13    Exercise Name 13  supine D2  -RS      Cueing 13  Verbal;Demo  -RS      Reps 13  10  -RS        User Key  (r) = Recorded By, (t) = Taken By, (c) = Cosigned By    Initials Name Provider Type    RS Flor Richards, PT Physical Therapist                      Manual Rx (last 36 hours)      Manual Treatments     Row Name 07/18/19 1700             Total Minutes    78563 - PT Manual Therapy Minutes  10  -RS         Manual Rx 1    Manual Rx 1 Location  R shoulder  -RS      Manual Rx 1 Type  posterior GH mobs, inferior distract  -RS      Manual Rx 1 Grade  III  -RS         Manual Rx 3    Manual Rx 3 Location  scapulothoracic mobs  -RS      Manual Rx 3 Type  down and back  -RS        User Key  (r) = Recorded By, (t) = Taken By, (c) = Cosigned By    Initials Name Provider Type    RS Flor Richards, PT Physical Therapist                             Time Calculation:   Start Time: 1745  Stop Time: 1830  Time Calculation (min): 45 min  Therapy Charges for Today     Code Description Service Date Service Provider Modifiers Qty    67720153231  PT THER PROC EA 15 MIN 7/18/2019 Flor Richards, PT GP 2    85892153352 HC PT MANUAL THERAPY EA 15 MIN 7/18/2019 Flor Richards, PT GP 1                    Flor Richards PT  7/18/2019

## 2019-07-23 ENCOUNTER — HOSPITAL ENCOUNTER (OUTPATIENT)
Dept: PHYSICAL THERAPY | Facility: HOSPITAL | Age: 84
Setting detail: THERAPIES SERIES
Discharge: HOME | End: 2019-07-23

## 2019-07-23 DIAGNOSIS — M25.511 ACUTE PAIN OF RIGHT SHOULDER: Primary | ICD-10-CM

## 2019-07-23 DIAGNOSIS — R29.3 POOR POSTURE: ICD-10-CM

## 2019-07-23 PROCEDURE — 97110 THERAPEUTIC EXERCISES: CPT | Performed by: PHYSICAL THERAPIST

## 2019-07-23 PROCEDURE — 97140 MANUAL THERAPY 1/> REGIONS: CPT | Performed by: PHYSICAL THERAPIST

## 2019-07-23 NOTE — THERAPY TREATMENT NOTE
Outpatient Physical Therapy Ortho Treatment Note  [facility]     Patient Name: Jim Romero  : 3/17/1931  MRN: [id number]  Today's Date: 2019      Visit Date: 2019    Visit Dx:    ICD-10-CM ICD-9-CM   1. Acute pain of right shoulder M25.511 719.41   2. Poor posture R29.3 781.92       Patient Active Problem List   Diagnosis   • RLS (restless legs syndrome)   • BPH (benign prostatic hyperplasia)   • GERD (gastroesophageal reflux disease)   • Sciatica   • Chronic fatigue   • IGT (impaired glucose tolerance)   • PMR (polymyalgia rheumatica) (CMS/HCC)   • Osteoporosis        Past Medical History:   Diagnosis Date   • Benign prostatic hyperplasia    • GERD (gastroesophageal reflux disease)    • Medication management    • RLS (restless legs syndrome)    • Sciatica         No past surgical history on file.                    PT Assessment/Plan     Row Name 19 8330          PT Assessment    Assessment Comments  Patient reports minimal pain today but states pain is worse with certain movements (i.e. reaching out, up, behind back, putting milk in refrigerator).  Progressed reps on a couple of ex today and added D1 flex and cw/ccw at 90 degrees (supine) with 1# weight without issue during ex.  Attempted UE elevation in SL but poor stabilization so d/c.  Taut/tender post RC tissues with palpation.  -RA        PT Plan    PT Plan Comments  Continue to work on shoulder strength/stability progressing per patient tolerance.  Consider trial of wash wall in PNF patterns   -RA       User Key  (r) = Recorded By, (t) = Taken By, (c) = Cosigned By    Initials Name Provider Type    Juliana Joe, PT Physical Therapist            Exercises     Row Name 19 1700 19 1600          Subjective Comments    Subjective Comments  Just a little pain/soreness, pain worse with certain movements  -RA  --        Subjective Pain    Able to rate subjective pain?  yes  -RA  --  -RA     Subjective Pain Comment   1-2/10  -RA  --        Total Minutes    44607 - PT Therapeutic Exercise Minutes  35  -RA  --     36043 - PT Manual Therapy Minutes  15  -RA  --        Exercise 1    Exercise Name 1  rev shoulder rolls  -RA  --     Cueing 1  Verbal  -RA  --     Reps 1  15  -RA  --        Exercise 2    Exercise Name 2  scap clock  -RA  --     Cueing 2  Verbal  -RA  --     Reps 2  5 ea CW/CCW  -RA  --        Exercise 3    Exercise Name 3  row   -RA  --     Cueing 3  Verbal  -RA  --     Sets 3  2  -RA  --     Reps 3  10  -RA  --     Time 3  rtb  -RA  --        Exercise 4    Exercise Name 4  UBE  -RA  --     Cueing 4  Verbal  -RA  --     Time 4  4 min  -RA  --        Exercise 5    Exercise Name 5  shoulder extension with band  -RA  --     Cueing 5  Verbal  -RA  --     Sets 5  2  -RA  --     Reps 5  10  -RA  --     Additional Comments  RTB  -RA  --        Exercise 6    Exercise Name 6  doorway stretch low  -RA  --     Cueing 6  Verbal  -RA  --     Reps 6  3  -RA  --     Time 6  20s  -RA  --        Exercise 7    Exercise Name 7  sidelying shoulder ER  -RA  --     Cueing 7  Verbal  -RA  --     Sets 7  2  -RA  --     Reps 7  10  -RA  --     Additional Comments  1#  -RA  --        Exercise 8    Exercise Name 8  supine B shoulder ER  -RA  --     Cueing 8  Verbal  -RA  --     Sets 8  2  -RA  --     Reps 8  10  -RA  --     Additional Comments  RTB  -RA  --        Exercise 9    Exercise Name 9  supine shoulder HA  -RA  --     Cueing 9  Verbal  -RA  --     Sets 9  2  -RA  --     Reps 9  10  -RA  --     Additional Comments  RTB   -RA  --        Exercise 10    Exercise Name 10  sidelying thoracic rotation modified- arm bent (bow and arrow)  -RA  --     Cueing 10  Verbal;Demo  -RA  --     Reps 10  5 ea  -RA  --     Time 10  5s  -RA  --        Exercise 11    Exercise Name 11  sidelying scapular D2 pattern with maual resistance  -RA  --     Cueing 11  Verbal;Tactile  -RA  --     Reps 11  8 each  -RA  --     Additional Comments  slow reversal   -RA  --         Exercise 12    Exercise Name 12  wall wash  -RA  --     Cueing 12  Verbal  -RA  --     Reps 12  10  -RA  --     Time 12  5s  -RA  --        Exercise 13    Exercise Name 13  supine D2  -RA  --     Cueing 13  Verbal;Demo  -RA  --     Sets 13  2  -RA  --     Reps 13  10  -RA  --        Exercise 14    Exercise Name 14  supine D1   -RA  --     Cueing 14  Verbal;Demo  -RA  --     Reps 14  10  -RA  --        Exercise 15    Exercise Name 15  supine (90 degrees flexion) cw/ccw circles   -RA  --     Reps 15  10/10  -RA  --     Additional Comments  1# weight   -RA  --       User Key  (r) = Recorded By, (t) = Taken By, (c) = Cosigned By    Initials Name Provider Type    Juliana Joe, PT Physical Therapist                      Manual Rx (last 36 hours)      Manual Treatments     Row Name 07/23/19 1700             Total Minutes    82588 - PT Manual Therapy Minutes  15  -RA         Manual Rx 1    Manual Rx 1 Location  R shoulder  -RA      Manual Rx 1 Type  posterior GH mobs, inferior distract  -RA      Manual Rx 1 Grade  III  -RA         Manual Rx 2    Manual Rx 2 Location  R post RC tissues proximal > distal   -RA      Manual Rx 2 Type  STM   -RA         Manual Rx 3    Manual Rx 3 Location  scapulothoracic mobs  -RA      Manual Rx 3 Type  down and back  -RA        User Key  (r) = Recorded By, (t) = Taken By, (c) = Cosigned By    Initials Name Provider Type    Juliana Joe, PT Physical Therapist              Therapy Education  Given: HEP, Symptoms/condition management, Pain management, Posture/body mechanics  Program: Reinforced  How Provided: Verbal, Demonstration  Provided to: Patient  Level of Understanding: Teach back education performed, Verbalized              Time Calculation:   Start Time: 1647  Stop Time: 1737  Time Calculation (min): 50 min  Therapy Charges for Today     Code Description Service Date Service Provider Modifiers Qty    08533943927  PT THER PROC EA 15 MIN 7/23/2019 Juliana Landeros, PT  GP 2    [id number]  PT MANUAL THERAPY EA 15 MIN 7/23/2019 Juliana Landeros, PT GP 1                    Juliana Landeros, PT  7/23/2019

## 2019-07-25 ENCOUNTER — HOSPITAL ENCOUNTER (OUTPATIENT)
Dept: PHYSICAL THERAPY | Facility: HOSPITAL | Age: 84
Setting detail: THERAPIES SERIES
Discharge: HOME | End: 2019-07-25

## 2019-07-25 DIAGNOSIS — R29.3 POOR POSTURE: ICD-10-CM

## 2019-07-25 DIAGNOSIS — M25.511 ACUTE PAIN OF RIGHT SHOULDER: Primary | ICD-10-CM

## 2019-07-25 PROCEDURE — 97140 MANUAL THERAPY 1/> REGIONS: CPT

## 2019-07-25 PROCEDURE — 97110 THERAPEUTIC EXERCISES: CPT

## 2019-07-25 NOTE — THERAPY TREATMENT NOTE
"    Outpatient Physical Therapy Ortho Treatment Note  [facility]     Patient Name: Jim Romero  : 3/17/1931  MRN: [id number]  Today's Date: 2019      Visit Date: 2019    Visit Dx:    ICD-10-CM ICD-9-CM   1. Acute pain of right shoulder M25.511 719.41   2. Poor posture R29.3 781.92       Patient Active Problem List   Diagnosis   • RLS (restless legs syndrome)   • BPH (benign prostatic hyperplasia)   • GERD (gastroesophageal reflux disease)   • Sciatica   • Chronic fatigue   • IGT (impaired glucose tolerance)   • PMR (polymyalgia rheumatica) (CMS/[facility])   • Osteoporosis        Past Medical History:   Diagnosis Date   • Benign prostatic hyperplasia    • GERD (gastroesophageal reflux disease)    • Medication management    • RLS (restless legs syndrome)    • Sciatica         No past surgical history on file.                    PT Assessment/Plan     Row Name 19 1700          PT Assessment    Assessment Comments  Pt reports increased R elbow pain this date exacerbated by full supination, negative testing for lateral or medial epicondylitis. Added supination/pronation with weight without reports of pain, wall wash D2, and shoulder extension with dowel with good tolerance. He continues to progress well toward functional goals.  -RS        PT Plan    PT Plan Comments  Assess tolerance for therex/ elbow  pain, progress as tolerated focused on improved R shoulder mobility and strength.  -RS       User Key  (r) = Recorded By, (t) = Taken By, (c) = Cosigned By    Initials Name Provider Type    RS Flor Richards, PT Physical Therapist            Exercises     Row Name 19 1700             Subjective Comments    Subjective Comments  I am sore from pulling boards from the deck.  -RS         Subjective Pain    Able to rate subjective pain?  yes  -RS      Subjective Pain Comment  \"negligible\" -2/10  -RS         Total Minutes    90268 - PT Therapeutic Exercise Minutes  30  -RS      63675 - PT Manual " Therapy Minutes  12  -RS         Exercise 1    Exercise Name 1  rev shoulder rolls  -RS      Cueing 1  Verbal  -RS      Reps 1  15  -RS         Exercise 2    Exercise Name 2  scap clock  -RS      Cueing 2  Verbal  -RS      Reps 2  5 ea CW/CCW  -RS         Exercise 3    Exercise Name 3  row   -RS      Cueing 3  Verbal  -RS      Sets 3  2  -RS      Reps 3  10  -RS      Time 3  GTB  -RS         Exercise 4    Exercise Name 4  UBE  -RS      Cueing 4  Verbal  -RS      Time 4  4 min  -RS         Exercise 5    Exercise Name 5  shoulder extension with band  -RS      Cueing 5  Verbal  -RS      Sets 5  2  -RS      Reps 5  10  -RS      Additional Comments  GTB  -RS         Exercise 6    Exercise Name 6  doorway stretch low  -RS      Cueing 6  Verbal  -RS      Reps 6  3  -RS      Time 6  20s  -RS         Exercise 7    Exercise Name 7  sidelying shoulder ER  -RS      Cueing 7  Verbal  -RS      Sets 7  2  -RS      Reps 7  10  -RS      Additional Comments  1#  -RS         Exercise 8    Exercise Name 8  supine B shoulder ER  -RS      Cueing 8  Verbal  -RS      Sets 8  2  -RS      Reps 8  10  -RS         Exercise 9    Exercise Name 9  supine shoulder HA  -RS      Cueing 9  Verbal  -RS      Sets 9  2  -RS      Reps 9  10  -RS      Additional Comments  RTB  -RS         Exercise 10    Exercise Name 10  sidelying thoracic rotation modified- arm bent (bow and arrow)  -RS      Cueing 10  Verbal;Demo  -RS      Reps 10  5 ea  -RS      Time 10  5s  -RS         Exercise 11    Exercise Name 11  sidelying scapular D2 pattern with maual resistance  -RS      Cueing 11  Verbal;Tactile  -RS      Reps 11  8 each  -RS      Additional Comments  slow reversal  -RS         Exercise 12    Exercise Name 12  wall wash  -RS      Cueing 12  Verbal  -RS      Reps 12  2x10  -RS      Time 12  5s  -RS      Additional Comments  plus scaption  -RS         Exercise 13    Exercise Name 13  supine D2  -RS      Cueing 13  Verbal;Demo  -RS      Sets 13  2  -RS      Reps  13  10  -RS      Additional Comments  no resist  -RS         Exercise 14    Exercise Name 14  supine D1   -RS      Cueing 14  Verbal;Demo  -RS      Reps 14  10  -RS      Additional Comments  no resist  -RS         Exercise 15    Exercise Name 15  supine (90 degrees flexion) cw/ccw circles   -RS      Reps 15  10/10  -RS      Additional Comments  1#  -RS         Exercise 16    Exercise Name 16  pronation/ supination  -RS      Cueing 16  Verbal  -RS      Reps 16  15  -RS      Additional Comments  2#  -RS         Exercise 17    Exercise Name 17  shoulder extension with dowel  -RS      Cueing 17  Verbal  -RS      Reps 17  10  -RS      Time 17  3s  -RS         Exercise 18    Exercise Name 18  cues for upright posture  -RS        User Key  (r) = Recorded By, (t) = Taken By, (c) = Cosigned By    Initials Name Provider Type    RS Flor Richards PT Physical Therapist                      Manual Rx (last 36 hours)      Manual Treatments     Row Name 07/25/19 1700             Total Minutes    66025 - PT Manual Therapy Minutes  12  -RS         Manual Rx 1    Manual Rx 1 Location  R shoulder  -RS      Manual Rx 1 Type  posterior GH mobs, inferior distract  -RS      Manual Rx 1 Grade  III  -RS         Manual Rx 2    Manual Rx 2 Location  R post RC tissues proximal > distal   -RS      Manual Rx 2 Type  STM   -RS         Manual Rx 3    Manual Rx 3 Location  scapulothoracic mobs  -RS      Manual Rx 3 Type  down and back  -RS        User Key  (r) = Recorded By, (t) = Taken By, (c) = Cosigned By    Initials Name Provider Type    RS Flor Richards PT Physical Therapist                             Time Calculation:   Start Time: 1655  Stop Time: 1740  Time Calculation (min): 45 min  Therapy Charges for Today     Code Description Service Date Service Provider Modifiers Qty    [id number]  PT THER PROC EA 15 MIN 7/25/2019 Flor Richards, PT GP 2    [id number] HC PT MANUAL THERAPY EA 15 MIN 7/25/2019 Flor Richards, PT GP 1                     Flor Richards, PT  7/25/2019

## 2019-07-30 ENCOUNTER — APPOINTMENT (OUTPATIENT)
Dept: PHYSICAL THERAPY | Facility: HOSPITAL | Age: 84
End: 2019-07-30

## 2019-08-01 ENCOUNTER — HOSPITAL ENCOUNTER (OUTPATIENT)
Dept: PHYSICAL THERAPY | Facility: HOSPITAL | Age: 84
Setting detail: THERAPIES SERIES
Discharge: HOME | End: 2019-08-01

## 2019-08-01 DIAGNOSIS — R29.3 POOR POSTURE: ICD-10-CM

## 2019-08-01 DIAGNOSIS — M25.511 ACUTE PAIN OF RIGHT SHOULDER: Primary | ICD-10-CM

## 2019-08-01 PROCEDURE — 97140 MANUAL THERAPY 1/> REGIONS: CPT

## 2019-08-01 PROCEDURE — 97110 THERAPEUTIC EXERCISES: CPT

## 2019-08-01 NOTE — THERAPY TREATMENT NOTE
Outpatient Physical Therapy Ortho Treatment Note  [facility]     Patient Name: Jim Romero  : 3/17/1931  MRN: [id number]  Today's Date: 2019      Visit Date: 2019    Visit Dx:    ICD-10-CM ICD-9-CM   1. Acute pain of right shoulder M25.511 719.41   2. Poor posture R29.3 781.92       Patient Active Problem List   Diagnosis   • RLS (restless legs syndrome)   • BPH (benign prostatic hyperplasia)   • GERD (gastroesophageal reflux disease)   • Sciatica   • Chronic fatigue   • IGT (impaired glucose tolerance)   • PMR (polymyalgia rheumatica) (CMS/[facility])   • Osteoporosis        Past Medical History:   Diagnosis Date   • Benign prostatic hyperplasia    • GERD (gastroesophageal reflux disease)    • Medication management    • RLS (restless legs syndrome)    • Sciatica         No past surgical history on file.                    PT Assessment/Plan     Row Name 19 1700          PT Assessment    Assessment Comments  Added pulleys and golf swing simulation this date due to pt reports of increased pain with overhead dressing. The pt continues to be limited in full upright posture with rounded shoulders and forward head posture.  During golf swing noted rounded shoulders and decreased shoulder mobility. Instructed pt to continue to hold on full golf, can putt.   -RS        PT Plan    PT Plan Comments  Assess tolerance for pulleys/ golf swing, consider supine over noodle.  -RS       User Key  (r) = Recorded By, (t) = Taken By, (c) = Cosigned By    Initials Name Provider Type    RS Flor Richards, PT Physical Therapist            Exercises     Row Name 19 1700 19 1600          Subjective Comments    Subjective Comments  --  The pt reports he is sore with certain movements, like putting on a shirt or reaching behind my back.  -RS        Subjective Pain    Able to rate subjective pain?  --  yes  -RS     Pre-Treatment Pain Level  --  3  -RS        Total Minutes    36974 - PT Therapeutic Exercise  Minutes  --  30  -RS     40699 - PT Manual Therapy Minutes  10  -RS  --        Exercise 1    Exercise Name 1  --  pulleys  -RS     Cueing 1  --  Verbal  -RS     Sets 1  --  3  -RS     Reps 1  --  10  -RS     Time 1  --  flex/ scap/IR  -RS     Additional Comments  --  --  -RS        Exercise 2    Exercise Name 2  --  scap clock  -RS     Cueing 2  --  Verbal  -RS     Reps 2  --  5 ea CW/CCW  -RS        Exercise 3    Exercise Name 3  --  row   -RS     Cueing 3  --  Verbal  -RS     Sets 3  --  2  -RS     Reps 3  --  10  -RS     Time 3  --  GTB  -RS        Exercise 4    Exercise Name 4  --  UBE  -RS     Cueing 4  --  Verbal  -RS     Time 4  --  4 min  -RS        Exercise 5    Exercise Name 5  --  shoulder extension with band  -RS     Cueing 5  --  Verbal  -RS     Sets 5  --  2  -RS     Reps 5  --  10  -RS     Additional Comments  --  GTB  -RS        Exercise 6    Exercise Name 6  --  doorway stretch low  -RS     Cueing 6  --  Verbal  -RS     Reps 6  --  3  -RS     Time 6  --  20s  -RS        Exercise 7    Exercise Name 7  --  sidelying shoulder ER  -RS     Cueing 7  --  Verbal  -RS     Sets 7  --  2  -RS     Reps 7  --  10  -RS        Exercise 8    Exercise Name 8  --  supine B shoulder ER  -RS     Cueing 8  --  Verbal  -RS     Sets 8  --  2  -RS     Reps 8  --  10  -RS     Additional Comments  --  RTB  -RS        Exercise 9    Exercise Name 9  --  supine shoulder HA  -RS     Cueing 9  --  Verbal  -RS     Sets 9  --  2  -RS     Reps 9  --  10  -RS     Additional Comments  --  RTB  -RS        Exercise 10    Exercise Name 10  --  sidelying thoracic rotation modified- arm bent (bow and arrow)  -RS     Cueing 10  --  Verbal;Demo  -RS     Reps 10  --  5 ea  -RS     Time 10  --  5s  -RS        Exercise 11    Exercise Name 11  --  sidelying scapular D2 pattern with maual resistance  -RS     Cueing 11  --  Verbal;Tactile  -RS     Reps 11  --  8 each  -RS     Additional Comments  --  slow reversal  -RS        Exercise 12     Exercise Name 12  --  wall wash  -RS     Cueing 12  --  Verbal  -RS     Reps 12  --  2x10  -RS     Time 12  --  5s  -RS     Additional Comments  --  plus scaption  -RS        Exercise 13    Exercise Name 13  --  standing D2 AROM  -RS     Cueing 13  --  Verbal;Demo  -RS     Sets 13  --  2  -RS     Reps 13  --  10  -RS     Time 13  --  painfree ROM  -RS     Additional Comments  --  no resist  -RS        Exercise 14    Exercise Name 14  --  standing  D1   -RS     Cueing 14  --  Verbal;Demo  -RS     Reps 14  --  10  -RS     Time 14  --  painfree ROM  -RS     Additional Comments  --  no resist  -RS        Exercise 15    Exercise Name 15  --  circles at wall small ball  -RS     Reps 15  --  10/10  -RS        Exercise 16    Exercise Name 16  --  golf swing simulation with dowel  -RS     Cueing 16  --  Verbal  -RS     Reps 16  --  --  -RS     Additional Comments  --  2 min  -RS        Exercise 17    Exercise Name 17  --  shoulder extension with dowel  -RS     Cueing 17  --  Verbal  -RS     Reps 17  --  10  -RS     Time 17  --  3s  -RS     Additional Comments  --  cues for upright posture  -RS        Exercise 18    Exercise Name 18  --  --  -RS       User Key  (r) = Recorded By, (t) = Taken By, (c) = Cosigned By    Initials Name Provider Type    RS Flor Richards PT Physical Therapist                      Manual Rx (last 36 hours)      Manual Treatments     Row Name 08/01/19 1700             Total Minutes    56427 - PT Manual Therapy Minutes  10  -RS         Manual Rx 1    Manual Rx 1 Location  R shoulder  -RS      Manual Rx 1 Type  posterior GH mobs, inferior distract  -RS      Manual Rx 1 Grade  III  -RS         Manual Rx 3    Manual Rx 3 Location  scapulothoracic mobs  -RS      Manual Rx 3 Type  down and back  -RS        User Key  (r) = Recorded By, (t) = Taken By, (c) = Cosigned By    Initials Name Provider Type    RS Flor Richards PT Physical Therapist          PT OP Goals     Row Name 08/01/19 1600          PT  Short Term Goals    STG Date to Achieve  08/08/19  -RS     STG 1  Pt will be independent and verbalized good understanding of initial HEP to improve ability to manage pain, as well as improve strength and ROM.  -RS     STG 1 Progress  Partially Met  -RS     STG 2  Pt will improve R shoulder flexion to at least 165 deg to improve ability to reach overhead.   -RS     STG 2 Progress  Ongoing  -RS     STG 3  Pt will demo at least 4/5 R shouder strength to improve ability to reach overhead  -RS     STG 3 Progress  Ongoing  -RS        Long Term Goals    LTG Date to Achieve  09/05/19  -RS     LTG 1  Pt will be independent and verbalized good understanding of advanced HEP to improve ability to manage pain, as well as improve strength and ROM.  -RS     LTG 1 Progress  Ongoing  -RS     LTG 2  Pt will report </=1/10 pain in R shoulder with overhead activities to improve participation in ADLs.   -RS     LTG 2 Progress  Ongoing  -RS     LTG 3  Pt will improve DASH score to at least <10% perceived disability to show overall improved quality of life.  -RS     LTG 3 Progress  Ongoing  -RS       User Key  (r) = Recorded By, (t) = Taken By, (c) = Cosigned By    Initials Name Provider Type    RS Flor Richards, SHAJI Physical Therapist                         Time Calculation:   Start Time: 1615  Stop Time: 1700  Time Calculation (min): 45 min  Therapy Charges for Today     Code Description Service Date Service Provider Modifiers Qty    22169311256  PT THER PROC EA 15 MIN 8/1/2019 Flor Richards, PT GP 2    90952575485 HC PT MANUAL THERAPY EA 15 MIN 8/1/2019 Flor Richards, PT GP 1                    Flor Richards PT  8/1/2019

## 2019-08-06 ENCOUNTER — HOSPITAL ENCOUNTER (OUTPATIENT)
Dept: PHYSICAL THERAPY | Facility: HOSPITAL | Age: 84
Setting detail: THERAPIES SERIES
Discharge: HOME | End: 2019-08-06

## 2019-08-06 DIAGNOSIS — R29.3 POOR POSTURE: ICD-10-CM

## 2019-08-06 DIAGNOSIS — M25.511 ACUTE PAIN OF RIGHT SHOULDER: Primary | ICD-10-CM

## 2019-08-06 PROCEDURE — 97110 THERAPEUTIC EXERCISES: CPT

## 2019-08-07 NOTE — THERAPY TREATMENT NOTE
Outpatient Physical Therapy Ortho Treatment Note  [facility]     Patient Name: Jim Romero  : 3/17/1931  MRN: [id number]  Today's Date: 2019      Visit Date: 2019    Visit Dx:    ICD-10-CM ICD-9-CM   1. Acute pain of right shoulder M25.511 719.41   2. Poor posture R29.3 781.92       Patient Active Problem List   Diagnosis   • RLS (restless legs syndrome)   • BPH (benign prostatic hyperplasia)   • GERD (gastroesophageal reflux disease)   • Sciatica   • Chronic fatigue   • IGT (impaired glucose tolerance)   • PMR (polymyalgia rheumatica) (CMS/[facility])   • Osteoporosis        Past Medical History:   Diagnosis Date   • Benign prostatic hyperplasia    • GERD (gastroesophageal reflux disease)    • Medication management    • RLS (restless legs syndrome)    • Sciatica         No past surgical history on file.                    PT Assessment/Plan     Row Name 19 1345          PT Assessment    Assessment Comments  Pt reporting minimal inc symptoms with 40 or so golf swings over weekend and continued improvement in R shoulder pain. Poor posture, limited thoracic mobility and capsular tightness limit appropriate RUE mechanics with golf swing. Continued ROM and postural improvement exercises without difficulty today. Performed PNF in supine position due to compensations noted in standing. Pt eager to return to golfing and reports no pain with practice swings at home.   -LB        PT Plan    PT Plan Comments  Assess tolerance to golf swing at range x 20. Consider supine on noodle exercises.   -LB       User Key  (r) = Recorded By, (t) = Taken By, (c) = Cosigned By    Initials Name Provider Type    LB Lina Larios, PT Physical Therapist          Modalities     Row Name 19 1600             Ice    Ice Applied  Yes  -LB      Location  R shoulder   -LB      Rx Minutes  10 mins  -LB      Ice S/P Rx  Yes  -LB        User Key  (r) = Recorded By, (t) = Taken By, (c) = Cosigned By    Initials Name Provider  Type    LB Lina Larios, PT Physical Therapist        Exercises     Row Name 08/06/19 1600             Subjective Comments    Subjective Comments  I am doing pretty well. I have some minimal pain most of the time but if I do a certain movement it increases.  -LB         Subjective Pain    Able to rate subjective pain?  yes  -LB      Pre-Treatment Pain Level  1  -LB         Total Minutes    05206 - PT Therapeutic Exercise Minutes  42  -LB         Exercise 1    Exercise Name 1  pulleys  -LB      Cueing 1  Verbal  -LB      Sets 1  3  -LB      Reps 1  10  -LB      Time 1  flex/ scap/IR  -LB         Exercise 2    Exercise Name 2  scap clock  -LB      Cueing 2  Verbal  -LB      Reps 2  5 ea CW/CCW  -LB         Exercise 3    Exercise Name 3  row   -LB      Cueing 3  Verbal  -LB      Sets 3  2  -LB      Reps 3  10  -LB      Time 3  GTB  -LB         Exercise 4    Exercise Name 4  UBE  -LB      Cueing 4  Verbal  -LB      Time 4  4 min  -LB         Exercise 5    Exercise Name 5  shoulder extension with band  -LB      Cueing 5  Verbal  -LB      Sets 5  2  -LB      Reps 5  10  -LB      Additional Comments  GTB  -LB         Exercise 6    Exercise Name 6  doorway stretch low  -LB      Cueing 6  Verbal  -LB      Reps 6  3  -LB      Time 6  20s  -LB         Exercise 7    Exercise Name 7  sidelying shoulder ER  -LB      Cueing 7  Verbal  -LB      Sets 7  2  -LB      Reps 7  10  -LB         Exercise 8    Exercise Name 8  supine B shoulder ER  -LB      Cueing 8  Verbal  -LB      Sets 8  2  -LB      Reps 8  10  -LB      Additional Comments  RTB  -LB         Exercise 9    Exercise Name 9  supine shoulder HA  -LB      Cueing 9  Verbal  -LB      Sets 9  2  -LB      Reps 9  10  -LB      Additional Comments  GTB  -LB         Exercise 10    Exercise Name 10  sidelying thoracic rotation modified- arm bent (bow and arrow)  -LB      Cueing 10  Verbal;Demo  -LB      Reps 10  5 ea  -LB      Time 10  5s  -LB         Exercise 11    Exercise Name 11   sidelying scapular D2 pattern with maual resistance  -LB      Cueing 11  Verbal;Tactile  -LB      Reps 11  8 each  -LB      Additional Comments  slow reversal  -LB         Exercise 12    Exercise Name 12  wall wash  -LB      Cueing 12  Verbal  -LB      Reps 12  2x10  -LB      Time 12  5s  -LB      Additional Comments  plus scaption  -LB         Exercise 13    Exercise Name 13  supine D2 AROM  -LB      Cueing 13  Verbal;Demo  -LB      Sets 13  2  -LB      Reps 13  10  -LB      Time 13  painfree ROM  -LB      Additional Comments  --  -LB         Exercise 14    Exercise Name 14  supine D1  -LB      Cueing 14  Verbal;Demo  -LB      Sets 14  2  -LB      Reps 14  10  -LB      Time 14  --  -LB      Additional Comments  RTB  -LB         Exercise 15    Exercise Name 15  circles at wall small ball  -LB      Reps 15  10/10  -LB         Exercise 16    Exercise Name 16  golf swing simulation with dowel  -LB      Cueing 16  Verbal  -LB      Reps 16  10  -LB         Exercise 17    Exercise Name 17  shoulder extension with dowel  -LB      Cueing 17  Verbal  -LB      Reps 17  10  -LB      Time 17  3s  -LB         Exercise 18    Exercise Name 18  supine beach chair stretch  -LB      Reps 18  3  -LB      Time 18  20  -LB        User Key  (r) = Recorded By, (t) = Taken By, (c) = Cosigned By    Initials Name Provider Type    Lina Wolf, PT Physical Therapist                       PT OP Goals     Row Name 08/07/19 1100          PT Short Term Goals    STG Date to Achieve  08/08/19  -LB     STG 1  Pt will be independent and verbalized good understanding of initial HEP to improve ability to manage pain, as well as improve strength and ROM.  -LB     STG 1 Progress  Partially Met  -LB     STG 2  Pt will improve R shoulder flexion to at least 165 deg to improve ability to reach overhead.   -LB     STG 2 Progress  Ongoing  -LB     STG 3  Pt will demo at least 4/5 R shouder strength to improve ability to reach overhead  -LB     STG 3  Progress  Ongoing  -LB        Long Term Goals    LTG Date to Achieve  09/05/19  -LB     LTG 1  Pt will be independent and verbalized good understanding of advanced HEP to improve ability to manage pain, as well as improve strength and ROM.  -LB     LTG 1 Progress  Ongoing  -LB     LTG 2  Pt will report </=1/10 pain in R shoulder with overhead activities to improve participation in ADLs.   -LB     LTG 2 Progress  Ongoing  -LB     LTG 3  Pt will improve DASH score to at least <10% perceived disability to show overall improved quality of life.  -LB     LTG 3 Progress  Ongoing  -LB       User Key  (r) = Recorded By, (t) = Taken By, (c) = Cosigned By    Initials Name Provider Type    Lina Wolf, PT Physical Therapist          Therapy Education  Education Details: discussed allowing 20 swings on driving range with careful assessment to tolerance and stopping with shoulder pain  Given: Symptoms/condition management, HEP  Program: Reinforced  How Provided: Verbal, Demonstration  Provided to: Patient  Level of Understanding: Teach back education performed, Verbalized, Demonstrated              Time Calculation:   Start Time: 1600  Stop Time: 1655  Time Calculation (min): 55 min  Total Timed Code Minutes- PT: 43 minute(s)  Therapy Charges for Today     Code Description Service Date Service Provider Modifiers Qty    68790234031 HC PT THER PROC EA 15 MIN 8/6/2019 Lina Larios, PT GP 3    [id number]  PT HOT OR COLD PACK TREAT MCARE 8/6/2019 Lina Larios, PT GP 1                    Lina Larios, SHAJI  8/7/2019

## 2019-08-08 ENCOUNTER — HOSPITAL ENCOUNTER (OUTPATIENT)
Dept: PHYSICAL THERAPY | Facility: HOSPITAL | Age: 84
Setting detail: THERAPIES SERIES
Discharge: HOME | End: 2019-08-08

## 2019-08-08 DIAGNOSIS — R29.3 POOR POSTURE: ICD-10-CM

## 2019-08-08 DIAGNOSIS — M25.511 ACUTE PAIN OF RIGHT SHOULDER: Primary | ICD-10-CM

## 2019-08-08 PROCEDURE — 97110 THERAPEUTIC EXERCISES: CPT

## 2019-08-08 NOTE — THERAPY PROGRESS REPORT/RE-CERT
Outpatient Physical Therapy Ortho Progress Note  [facility]     Patient Name: Jim Romero  : 3/17/1931  MRN: [id number]  Today's Date: 2019      Visit Date: 2019    Patient Active Problem List   Diagnosis   • RLS (restless legs syndrome)   • BPH (benign prostatic hyperplasia)   • GERD (gastroesophageal reflux disease)   • Sciatica   • Chronic fatigue   • IGT (impaired glucose tolerance)   • PMR (polymyalgia rheumatica) (CMS/HCC)   • Osteoporosis        Past Medical History:   Diagnosis Date   • Benign prostatic hyperplasia    • GERD (gastroesophageal reflux disease)    • Medication management    • RLS (restless legs syndrome)    • Sciatica         No past surgical history on file.    Visit Dx:     ICD-10-CM ICD-9-CM   1. Acute pain of right shoulder M25.511 719.41   2. Poor posture R29.3 781.92                             Therapy Education  Education Details: discussed slow progression by 5-10 golf swings every other day on golf course  Given: Symptoms/condition management, HEP  Program: Reinforced  How Provided: Demonstration, Verbal  Provided to: Patient  Level of Understanding: Teach back education performed, Verbalized, Demonstrated     PT OP Goals     Row Name 19 1700          PT Short Term Goals    STG Date to Achieve  19  -LB     STG 1  Pt will be independent and verbalized good understanding of initial HEP to improve ability to manage pain, as well as improve strength and ROM.  -LB     STG 1 Progress  Met  -LB     STG 2  Pt will improve R shoulder flexion to at least 165 deg to improve ability to reach overhead.   -LB     STG 2 Progress  Not Met  -LB     STG 2 Progress Comments  150 deg R flexion  -LB     STG 3  Pt will demo at least 4/5 R shouder strength to improve ability to reach overhead  -LB     STG 3 Progress  Met  -LB     STG 3 Progress Comments  5/5 flexion  -LB        Long Term Goals    LTG Date to Achieve  19  -LB     LTG 1  Pt will be independent and  verbalized good understanding of advanced HEP to improve ability to manage pain, as well as improve strength and ROM.  -LB     LTG 1 Progress  Progressing  -LB     LTG 1 Progress Comments  Discussed safe return to sport.  -LB     LTG 2  Pt will report </=1/10 pain in R shoulder with overhead activities to improve participation in ADLs.   -LB     LTG 2 Progress  Ongoing  -LB     LTG 2 Progress Comments  2/10 after golfing x 24 balls yesterday  -LB     LTG 3  Pt will improve DASH score to at least <10% perceived disability to show overall improved quality of life.  -LB     LTG 3 Progress  Met  -LB     LTG 3 Progress Comments  8% disability   -LB       User Key  (r) = Recorded By, (t) = Taken By, (c) = Cosigned By    Initials Name Provider Type    Lina Wolf, PT Physical Therapist          PT Assessment/Plan     Row Name 08/08/19 1701          PT Assessment    Functional Limitations  Performance in sport activities;Performance in leisure activities;Limitations in functional capacity and performance  -LB     Impairments  Range of motion;Posture;Pain;Joint mobility  -LB     Assessment Comments  Pt has participated in 8 skilled PT sessions to address R shoulder pain and dec mobility. His greatest concern was ability to return to golfing. Pt demonstrates similar AROM of R shoudler with AROM to 150 deg. However, he reports 0/10 with AROM. His RUE strength has improved to 5/5 in all planes with minimal pain reported with resisted abduction only. His self reported DASH disability score has reduced from 33% to 8% disability. He has returned to hitting 20-30 golf balls without inc R shoulder symptoms. He understands and is compliant with HEP and management strategies to reduce overuse and inflammation. He will continue with one more skilled PT visit to finalize HEP and ensure understanding of continued management.   -LB     Please refer to paper survey for additional self-reported information  Yes  -LB     Rehab Potential   Good  -LB     Patient/caregiver participated in establishment of treatment plan and goals  Yes  -LB     Patient would benefit from skilled therapy intervention  Yes  -LB        PT Plan    PT Frequency  1x/week  -LB     Predicted Duration of Therapy Intervention (Therapy Eval)  1 final visit.  -LB     Planned CPT's?  PT EVAL MOD COMPLELITY: 25258;PT THER ACT EA 15 MIN: 61830;PT THER PROC EA 15 MIN: 79323;PT MANUAL THERAPY EA 15 MIN: 21057;PT HOT OR COLD PACK TREAT MCARE;PT HOT/COLD PACK WC NONMCARE: 82035  -LB     PT Plan Comments  finalize HEP and d/c  -LB       User Key  (r) = Recorded By, (t) = Taken By, (c) = Cosigned By    Initials Name Provider Type    LB Lina Larios, PT Physical Therapist            Exercises     Row Name 08/08/19 1600             Subjective Comments    Subjective Comments  I hit 24 golf balls and I have no increased pain. I am feeling pretty good.  -LB         Subjective Pain    Able to rate subjective pain?  yes  -LB      Pre-Treatment Pain Level  1  -LB         Total Minutes    11401 - PT Therapeutic Exercise Minutes  42  -LB         Exercise 1    Exercise Name 1  pulleys  -LB      Cueing 1  Verbal  -LB      Sets 1  3  -LB      Reps 1  10  -LB      Time 1  flex/ scap/IR  -LB         Exercise 2    Exercise Name 2  scap clock  -LB      Cueing 2  Verbal  -LB      Reps 2  5 ea CW/CCW  -LB         Exercise 3    Exercise Name 3  row   -LB      Cueing 3  Verbal  -LB      Sets 3  2  -LB      Reps 3  10  -LB      Time 3  GTB  -LB         Exercise 4    Exercise Name 4  UBE  -LB      Cueing 4  Verbal  -LB      Time 4  4 min  -LB         Exercise 5    Exercise Name 5  shoulder extension with band  -LB      Cueing 5  Verbal  -LB      Sets 5  2  -LB      Reps 5  10  -LB      Additional Comments  GTB  -LB         Exercise 6    Exercise Name 6  doorway stretch low  -LB      Cueing 6  Verbal  -LB      Reps 6  3  -LB      Time 6  20s  -LB         Exercise 7    Exercise Name 7  sidelying shoulder ER  -LB       Cueing 7  Verbal  -LB      Sets 7  2  -LB      Reps 7  10  -LB         Exercise 8    Exercise Name 8  supine B shoulder ER  -LB      Cueing 8  Verbal  -LB      Sets 8  2  -LB      Reps 8  10  -LB      Additional Comments  RTB  -LB         Exercise 9    Exercise Name 9  supine shoulder HA  -LB      Cueing 9  Verbal  -LB      Sets 9  2  -LB      Reps 9  10  -LB      Additional Comments  GTB  -LB         Exercise 10    Exercise Name 10  sidelying thoracic rotation modified- arm bent (bow and arrow)  -LB      Cueing 10  Verbal;Demo  -LB      Reps 10  5 ea  -LB      Time 10  5s  -LB         Exercise 11    Exercise Name 11  sidelying scapular D2 pattern with maual resistance  -LB      Cueing 11  Verbal;Tactile  -LB      Reps 11  8 each  -LB      Additional Comments  slow reversal  -LB         Exercise 12    Exercise Name 12  wall wash  -LB      Cueing 12  Verbal  -LB      Reps 12  2x10  -LB      Time 12  5s  -LB      Additional Comments  plus scaption  -LB         Exercise 13    Exercise Name 13  supine D2 AROM  -LB      Cueing 13  Verbal;Demo  -LB      Sets 13  2  -LB      Reps 13  10  -LB      Time 13  painfree ROM  -LB         Exercise 14    Exercise Name 14  supine D1  -LB      Cueing 14  Verbal;Demo  -LB      Sets 14  2  -LB      Reps 14  10  -LB      Additional Comments  RTB  -LB         Exercise 15    Exercise Name 15  circles at wall small ball  -LB      Reps 15  10/10  -LB         Exercise 16    Exercise Name 16  golf swing simulation with dowel  -LB      Cueing 16  Verbal  -LB      Reps 16  10  -LB         Exercise 17    Exercise Name 17  shoulder extension with dowel  -LB      Cueing 17  Verbal  -LB      Reps 17  10  -LB      Time 17  3s  -LB         Exercise 18    Exercise Name 18  supine beach chair stretch  -LB      Reps 18  3  -LB      Time 18  20  -LB        User Key  (r) = Recorded By, (t) = Taken By, (c) = Cosigned By    Initials Name Provider Type    Lina Wolf PT Physical Therapist                         Outcome Measure Options: Disabilities of the Arm, Shoulder, and Hand (DASH)  DASH  Open a tight or new jar.: No Difficulty  Write: No Difficulty  Turn a key: No Difficulty  Prepare a meal: No Difficulty  Push open a heavy door: Mild Difficulty  Place an object on a shelf above your head: Mild Difficulty  Do heavy household chores (e.g., wash walls, wash floors): Mild Difficulty  Garden or do yard work: Mild Difficulty  Make a bed: No Difficulty  Carry a shopping bag or briefcase: No Difficulty  Carry a heavy object (over 10 lbs): Mild Difficulty  Change a lightbulb overhead: Mild Difficulty  Wash or blow dry your hair: No Difficulty  Wash your back: No Difficulty  Put on a pullover sweater: Mild Difficulty  Use a knife to cut food: No Difficulty  Recreational activities in which require little effort (e.g., cardplaying, knitting, etc.): No Difficulty  Recreational activities in which you take some force or impact through your arm, should or hand (e.g. golf, hammering, tennis, etc.): No Difficulty  Recreational Activities in which you move your arm freely (e.g., frisbee, badminton, etc.): No Difficulty  Manage transportation needs (getting from one place to another): No Difficulty  Sexual Activities: No Difficulty  During the past week, to what extent has your arm, shoulder, or hand problem interfered with your normal social activites with family, friends, neighbors or groups?: Not at all  During the past week, were you limited in your work or other regular daily activities as a result of your arm, shoulder or hand problem?: Not limited at all  Arm, Shoulder, or hand pain: Mild  Arm, shoulder or hand pain when you performed any specific activity: Mild  Tingling (pins and needles) in your arm, shoulder, or hand: None  Weakness in your arm, shoulder or hand: None  Stiffness in your arm, shoulder or hand: None  During the past week, how much difficulty have you had sleeping because of the pain in your  arm, shoulder or hand?: No difficulty  I feel less capable, less confident or less useful because of my arm, shoulder or hand problem: Strongly disagree  DASH Sum : 39  Number of Questions Answered: 30  DASH Score: 7.5  DASH COMMENTS: 8% disability          Time Calculation:     Start Time: 1615  Stop Time: 1710  Time Calculation (min): 55 min  Total Timed Code Minutes- PT: 43 minute(s)     Therapy Charges for Today     Code Description Service Date Service Provider Modifiers Qty    34241636018 HC PT THER PROC EA 15 MIN 8/8/2019 Lina Larios, PT GP 3    70031417663 HC PT HOT OR COLD PACK TREAT MCARE 8/8/2019 Lina Larios, PT GP 1          PT G-Codes  Outcome Measure Options: Disabilities of the Arm, Shoulder, and Hand (DASH)         Lina Larios PT  8/8/2019

## 2019-08-13 ENCOUNTER — HOSPITAL ENCOUNTER (OUTPATIENT)
Dept: PHYSICAL THERAPY | Facility: HOSPITAL | Age: 84
Setting detail: THERAPIES SERIES
Discharge: HOME | End: 2019-08-13

## 2019-08-13 DIAGNOSIS — R29.3 POOR POSTURE: ICD-10-CM

## 2019-08-13 DIAGNOSIS — M25.511 ACUTE PAIN OF RIGHT SHOULDER: Primary | ICD-10-CM

## 2019-08-13 PROCEDURE — 97110 THERAPEUTIC EXERCISES: CPT

## 2019-08-13 NOTE — THERAPY TREATMENT NOTE
Outpatient Physical Therapy Ortho Treatment Note  [facility]     Patient Name: Jim Romero  : 3/17/1931  MRN: [id number]  Today's Date: 2019      Visit Date: 2019    Visit Dx:    ICD-10-CM ICD-9-CM   1. Acute pain of right shoulder M25.511 719.41   2. Poor posture R29.3 781.92       Patient Active Problem List   Diagnosis   • RLS (restless legs syndrome)   • BPH (benign prostatic hyperplasia)   • GERD (gastroesophageal reflux disease)   • Sciatica   • Chronic fatigue   • IGT (impaired glucose tolerance)   • PMR (polymyalgia rheumatica) (CMS/[facility])   • Osteoporosis        Past Medical History:   Diagnosis Date   • Benign prostatic hyperplasia    • GERD (gastroesophageal reflux disease)    • Medication management    • RLS (restless legs syndrome)    • Sciatica         No past surgical history on file.                    PT Assessment/Plan     Row Name 19 9548          PT Assessment    Assessment Comments  Pt returns for last visit today. Finalized and reviewed HEP. Pt understands need for continued performance to improve his AROM, trunk mobility, and R shoulder stabilizing strength. He continues to report good tolerance to slow return to golf game and understands slow return to full golf outing. Pt appropriate for d/c today.   -LB        PT Plan    PT Plan Comments  d/c to HEP.   -LB       User Key  (r) = Recorded By, (t) = Taken By, (c) = Cosigned By    Initials Name Provider Type    LB Lina Larios, PT Physical Therapist            Exercises     Row Name 19 1600             Subjective Comments    Subjective Comments  I hit quite a few balls and I feel pretty good.  -LB         Subjective Pain    Able to rate subjective pain?  yes  -LB      Pre-Treatment Pain Level  1  -LB         Total Minutes    78405 - PT Therapeutic Exercise Minutes  40  -LB         Exercise 1    Exercise Name 1  pulleys  -LB      Cueing 1  Verbal  -LB      Sets 1  3  -LB      Reps 1  10  -LB      Time 1  flex/  scap/IR  -LB         Exercise 3    Exercise Name 3  row   -LB      Cueing 3  Verbal  -LB      Sets 3  2  -LB      Reps 3  10  -LB      Time 3  GTB  -LB         Exercise 4    Exercise Name 4  UBE  -LB      Cueing 4  Verbal  -LB      Time 4  4 min  -LB         Exercise 5    Exercise Name 5  shoulder extension with band  -LB      Cueing 5  Verbal  -LB      Sets 5  2  -LB      Reps 5  10  -LB         Exercise 6    Exercise Name 6  doorway stretch low  -LB      Cueing 6  Verbal  -LB      Reps 6  3  -LB      Time 6  20s  -LB         Exercise 7    Exercise Name 7  sidelying shoulder ER  -LB      Cueing 7  Verbal  -LB      Sets 7  2  -LB      Reps 7  10  -LB      Additional Comments  2#  -LB         Exercise 8    Exercise Name 8  supine B shoulder ER  -LB      Cueing 8  Verbal  -LB      Sets 8  2  -LB      Reps 8  10  -LB      Additional Comments  RTB  -LB         Exercise 9    Exercise Name 9  supine shoulder HA  -LB      Cueing 9  Verbal  -LB      Sets 9  2  -LB      Reps 9  10  -LB      Additional Comments  GTB  -LB         Exercise 10    Exercise Name 10  sidelying thoracic rotation modified- arm bent (bow and arrow)  -LB      Cueing 10  Verbal;Demo  -LB      Reps 10  5 ea  -LB      Time 10  5s  -LB         Exercise 12    Exercise Name 12  wall wash  -LB      Cueing 12  Verbal  -LB      Reps 12  2x10  -LB      Time 12  5s  -LB         Exercise 15    Exercise Name 15  circles at wall small ball  -LB      Reps 15  10/10  -LB         Exercise 16    Exercise Name 16  golf swing simulation with dowel  -LB      Cueing 16  Verbal  -LB      Reps 16  10  -LB         Exercise 17    Exercise Name 17  shoulder extension with dowel  -LB      Cueing 17  Verbal  -LB      Reps 17  10  -LB      Time 17  3s  -LB         Exercise 18    Exercise Name 18  supine beach chair stretch  -LB      Reps 18  3  -LB      Time 18  20  -LB         Exercise 19    Exercise Name 19  supine AAROM abduction with dowel  -LB      Reps 19  10  -LB      Time  19  10  -LB        User Key  (r) = Recorded By, (t) = Taken By, (c) = Cosigned By    Initials Name Provider Type    Lina Wolf, PT Physical Therapist                       PT OP Goals     Row Name 08/13/19 1600          PT Short Term Goals    STG Date to Achieve  08/08/19  -LB     STG 1  Pt will be independent and verbalized good understanding of initial HEP to improve ability to manage pain, as well as improve strength and ROM.  -LB     STG 1 Progress  Met  -LB     STG 2  Pt will improve R shoulder flexion to at least 165 deg to improve ability to reach overhead.   -LB     STG 2 Progress  Not Met  -LB     STG 3  Pt will demo at least 4/5 R shouder strength to improve ability to reach overhead  -LB     STG 3 Progress  Met  -LB        Long Term Goals    LTG Date to Achieve  09/05/19  -LB     LTG 1  Pt will be independent and verbalized good understanding of advanced HEP to improve ability to manage pain, as well as improve strength and ROM.  -LB     LTG 1 Progress  Met  -LB     LTG 1 Progress Comments  Finalized today.  -LB     LTG 2  Pt will report </=1/10 pain in R shoulder with overhead activities to improve participation in ADLs.   -LB     LTG 2 Progress  Met  -LB     LTG 2 Progress Comments  1/10 after golfing yesterday.  -LB     LTG 3  Pt will improve DASH score to at least <10% perceived disability to show overall improved quality of life.  -LB     LTG 3 Progress  Met  -LB       User Key  (r) = Recorded By, (t) = Taken By, (c) = Cosigned By    Initials Name Provider Type    Lina Wolf PT Physical Therapist          Therapy Education  Education Details: issued finalized HEP, discussed continued management and slow return to full golf game  Given: HEP, Symptoms/condition management, Posture/body mechanics  Program: Progressed  How Provided: Verbal, Demonstration, Written  Provided to: Patient  Level of Understanding: Verbalized, Demonstrated, Teach back education performed              Time  Calculation:   Start Time: 1615  Stop Time: 1700  Time Calculation (min): 45 min  Total Timed Code Minutes- PT: 43 minute(s)  Therapy Charges for Today     Code Description Service Date Service Provider Modifiers Qty    96220090701 HC PT THER PROC EA 15 MIN 8/13/2019 Lina Larios, PT GP 3                    Lina Larios, PT  8/13/2019

## 2019-09-03 RX ORDER — PANTOPRAZOLE SODIUM 40 MG/1
TABLET, DELAYED RELEASE ORAL
Qty: 90 TABLET | Refills: 0 | Status: SHIPPED | OUTPATIENT
Start: 2019-09-03 | End: 2019-12-01 | Stop reason: SDUPTHER

## 2019-09-03 RX ORDER — TAMSULOSIN HYDROCHLORIDE 0.4 MG/1
CAPSULE ORAL
Qty: 180 CAPSULE | Refills: 0 | Status: SHIPPED | OUTPATIENT
Start: 2019-09-03 | End: 2019-12-01 | Stop reason: SDUPTHER

## 2019-09-03 RX ORDER — PRAMIPEXOLE DIHYDROCHLORIDE 0.5 MG/1
TABLET ORAL
Qty: 180 TABLET | Refills: 0 | Status: SHIPPED | OUTPATIENT
Start: 2019-09-03 | End: 2019-09-04 | Stop reason: SDUPTHER

## 2019-09-04 RX ORDER — PRAMIPEXOLE DIHYDROCHLORIDE 0.5 MG/1
TABLET ORAL
Qty: 180 TABLET | Refills: 0 | Status: SHIPPED | OUTPATIENT
Start: 2019-09-04 | End: 2019-09-10 | Stop reason: SDUPTHER

## 2019-09-09 ENCOUNTER — OFFICE VISIT (OUTPATIENT)
Dept: INTERNAL MEDICINE | Facility: CLINIC | Age: 84
End: 2019-09-09

## 2019-09-09 VITALS
HEART RATE: 73 BPM | SYSTOLIC BLOOD PRESSURE: 104 MMHG | BODY MASS INDEX: 23.84 KG/M2 | TEMPERATURE: 98.2 F | DIASTOLIC BLOOD PRESSURE: 60 MMHG | OXYGEN SATURATION: 95 % | RESPIRATION RATE: 16 BRPM | HEIGHT: 72 IN | WEIGHT: 176 LBS

## 2019-09-09 DIAGNOSIS — R73.02 IGT (IMPAIRED GLUCOSE TOLERANCE): ICD-10-CM

## 2019-09-09 DIAGNOSIS — M35.3 PMR (POLYMYALGIA RHEUMATICA) (HCC): Primary | ICD-10-CM

## 2019-09-09 DIAGNOSIS — K21.9 GASTROESOPHAGEAL REFLUX DISEASE, ESOPHAGITIS PRESENCE NOT SPECIFIED: ICD-10-CM

## 2019-09-09 DIAGNOSIS — M81.0 OSTEOPOROSIS, UNSPECIFIED OSTEOPOROSIS TYPE, UNSPECIFIED PATHOLOGICAL FRACTURE PRESENCE: ICD-10-CM

## 2019-09-09 DIAGNOSIS — Z23 NEED FOR IMMUNIZATION AGAINST INFLUENZA: ICD-10-CM

## 2019-09-09 PROCEDURE — G0008 ADMIN INFLUENZA VIRUS VAC: HCPCS | Performed by: INTERNAL MEDICINE

## 2019-09-09 PROCEDURE — 90674 CCIIV4 VAC NO PRSV 0.5 ML IM: CPT | Performed by: INTERNAL MEDICINE

## 2019-09-09 PROCEDURE — 99213 OFFICE O/P EST LOW 20 MIN: CPT | Performed by: INTERNAL MEDICINE

## 2019-09-09 NOTE — PROGRESS NOTES
Subjective   Jim Romero is a 88 y.o. male.     Chief Complaint   Patient presents with   • Polymyalgia Rheumatica         Hyperglycemia   This is a chronic problem. The current episode started more than 1 year ago. Associated symptoms include myalgias. Pertinent negatives include no chills or fatigue.   Pain   This is a chronic problem. The current episode started more than 1 month ago. The problem has been unchanged. Associated symptoms include myalgias. Pertinent negatives include no chills or fatigue.   Arthritis   Presents for follow-up visit. The symptoms have been stable. Affected locations include the right knee, left knee, left DIP and right hip. Pertinent negatives include no diarrhea, dysuria or fatigue.        The following portions of the patient's history were reviewed and updated as appropriate: allergies, current medications, past social history and problem list.    Outpatient Medications Marked as Taking for the 9/9/19 encounter (Office Visit) with Reji Gilman MD   Medication Sig Dispense Refill   • metFORMIN (GLUCOPHAGE) 500 MG tablet Take 1 tablet twice daily 180 tablet 1   • pantoprazole (PROTONIX) 40 MG EC tablet TAKE ONE TABLET BY MOUTH DAILY 90 tablet 0   • pramipexole (MIRAPEX) 0.5 MG tablet TAKE ONE TABLET BY MOUTH TWICE A  tablet 0   • predniSONE (DELTASONE) 1 MG tablet Take 4 tablets by mouth Every Morning. 240 tablet 1   • tamsulosin (FLOMAX) 0.4 MG capsule 24 hr capsule TAKE ONE CAPSULE BY MOUTH TWICE A  capsule 0       Review of Systems   Constitutional: Negative for chills and fatigue.   Gastrointestinal: Negative for diarrhea.   Genitourinary: Negative for dysuria.   Musculoskeletal: Positive for arthritis, back pain and myalgias.       Objective   Vitals:    09/09/19 1545   BP: 104/60   Pulse: 73   Resp: 16   Temp: 98.2 °F (36.8 °C)   SpO2: 95%          09/09/19  1545   Weight: 79.8 kg (176 lb)    [unfilled]  Body mass index is 23.86 kg/m².      Physical Exam    Constitutional: He appears well-developed and well-nourished.   Musculoskeletal: Normal range of motion. He exhibits no edema, tenderness or deformity.   Skin: Skin is warm and dry.         Problem List Items Addressed This Visit        Musculoskeletal and Integument    Osteoporosis    Relevant Orders    Ambulatory Referral to [facility] For Infusion Treatment      Other Visit Diagnoses     Need for immunization against influenza    -  Primary    Relevant Orders    Flucelvax Quad=>4Years (2701-4112) (Completed)        Assessment/Plan   In today for recheck of PMR.  Ache the lower back, hips, knees, thighs.  He states he was stiff and achy but that has cleared up.  No jaw claudication.  Some minor symptoms in the shoulder girdle.  Polymyalgia rheumatica would be the primary concern.  He had been on prednisone 4 mg daily for the past 8 weeks.  CRP was elevated.  Sedimentation rate was only minimally elevated.  Has started on Prolia.  Due for another Prolia injection.  On metformin now.  Sed rate, CRP, glucose, A1c today.  Currently is asymptomatic on 4 mg of prednisone.  Will recheck again in 8 weeks.  Cut prednisone to 3 mg daily today.  He started on prednisone on July 9, 2018.  Flu shot today.           .[email or web address]  Dragon disclaimer:   Much of this encounter note is an electronic transcription/translation of spoken language to printed text. The electronic translation of spoken language may permit erroneous, or at times, nonsensical words or phrases to be inadvertently transcribed; Although I have reviewed the note for such errors, some may still exist.

## 2019-09-09 NOTE — PATIENT INSTRUCTIONS
Influenza Virus Vaccine injection  What is this medicine?  INFLUENZA VIRUS VACCINE ([facility]) helps to reduce the risk of getting influenza also known as the flu. The vaccine only helps protect you against some strains of the flu.  This medicine may be used for other purposes; ask your health care provider or pharmacist if you have questions.  COMMON BRAND NAME(S): Afluria, Agriflu, Alfuria, FLUAD, Fluarix, Fluarix Quadrivalent, Flublok, Flublok Quadrivalent, FLUCELVAX, Flulaval, Fluvirin, Fluzone, Fluzone High-Dose, Fluzone Intradermal  What should I tell my health care provider before I take this medicine?  They need to know if you have any of these conditions:  -bleeding disorder like hemophilia  -fever or infection  -Guillain-Trout syndrome or other neurological problems  -immune system problems  -infection with the human immunodeficiency virus (HIV) or AIDS  -low blood platelet counts  -multiple sclerosis  -an unusual or allergic reaction to influenza virus vaccine, latex, other medicines, foods, dyes, or preservatives. Different brands of vaccines contain different allergens. Some may contain latex or eggs. Talk to your doctor about your allergies to make sure that you get the right vaccine.  -pregnant or trying to get pregnant  -breast-feeding  How should I use this medicine?  This vaccine is for injection into a muscle or under the skin. It is given by a health care professional.  A copy of Vaccine Information Statements will be given before each vaccination. Read this sheet carefully each time. The sheet may change frequently.  Talk to your healthcare provider to see which vaccines are right for you. Some vaccines should not be used in all age groups.  Overdosage: If you think you have taken too much of this medicine contact a poison control center or emergency room at once.  NOTE: This medicine is only for you. Do not share this medicine with others.  What if I miss a dose?  This  does not apply.  What may interact with this medicine?  -chemotherapy or radiation therapy  -medicines that lower your immune system like etanercept, anakinra, infliximab, and adalimumab  -medicines that treat or prevent blood clots like warfarin  -phenytoin  -steroid medicines like prednisone or cortisone  -theophylline  -vaccines  This list may not describe all possible interactions. Give your health care provider a list of all the medicines, herbs, non-prescription drugs, or dietary supplements you use. Also tell them if you smoke, drink alcohol, or use illegal drugs. Some items may interact with your medicine.  What should I watch for while using this medicine?  Report any side effects that do not go away within 3 days to your doctor or health care professional. Call your health care provider if any unusual symptoms occur within 6 weeks of receiving this vaccine.  You may still catch the flu, but the illness is not usually as bad. You cannot get the flu from the vaccine. The vaccine will not protect against colds or other illnesses that may cause fever. The vaccine is needed every year.  What side effects may I notice from receiving this medicine?  Side effects that you should report to your doctor or health care professional as soon as possible:  -allergic reactions like skin rash, itching or hives, swelling of the face, lips, or tongue  Side effects that usually do not require medical attention (report to your doctor or health care professional if they continue or are bothersome):  -fever  -headache  -muscle aches and pains  -pain, tenderness, redness, or swelling at the injection site  -tiredness  This list may not describe all possible side effects. Call your doctor for medical advice about side effects. You may report side effects to FDA at 8-027-FDA-9037.  Where should I keep my medicine?  The vaccine will be given by a health care professional in a clinic, pharmacy, doctor's office, or other health care  setting. You will not be given vaccine doses to store at home.  NOTE: This sheet is a summary. It may not cover all possible information. If you have questions about this medicine, talk to your doctor, pharmacist, or health care provider.  © 2019 Elsevier/Gold Standard (2016-07-08 10:07:28)

## 2019-09-10 DIAGNOSIS — M81.0 OSTEOPOROSIS, UNSPECIFIED OSTEOPOROSIS TYPE, UNSPECIFIED PATHOLOGICAL FRACTURE PRESENCE: Primary | ICD-10-CM

## 2019-09-10 LAB
CRP SERPL-MCNC: 0.11 MG/DL (ref 0–0.5)
ERYTHROCYTE [SEDIMENTATION RATE] IN BLOOD BY WESTERGREN METHOD: 5 MM/HR (ref 0–20)
GLUCOSE P FAST SERPL-MCNC: 114 MG/DL (ref 74–106)
HBA1C MFR BLD: 6.4 % (ref 4.8–5.6)

## 2019-09-10 RX ORDER — PREDNISONE 1 MG/1
3 TABLET ORAL EVERY MORNING
Qty: 270 TABLET | Refills: 1 | Status: SHIPPED | OUTPATIENT
Start: 2019-09-10 | End: 2019-11-05 | Stop reason: SDUPTHER

## 2019-09-10 RX ORDER — PRAMIPEXOLE DIHYDROCHLORIDE 0.5 MG/1
TABLET ORAL
Qty: 180 TABLET | Refills: 0 | Status: SHIPPED | OUTPATIENT
Start: 2019-09-10 | End: 2020-03-02 | Stop reason: SDUPTHER

## 2019-09-16 ENCOUNTER — OFFICE VISIT (OUTPATIENT)
Dept: INTERNAL MEDICINE | Facility: CLINIC | Age: 84
End: 2019-09-16

## 2019-09-16 DIAGNOSIS — M81.0 OSTEOPOROSIS, UNSPECIFIED OSTEOPOROSIS TYPE, UNSPECIFIED PATHOLOGICAL FRACTURE PRESENCE: Primary | ICD-10-CM

## 2019-09-16 LAB — CALCIUM SERPL-MCNC: 9.3 MG/DL (ref 8.6–10.5)

## 2019-09-18 ENCOUNTER — INFUSION (OUTPATIENT)
Dept: ONCOLOGY | Facility: HOSPITAL | Age: 84
End: 2019-09-18

## 2019-09-18 VITALS
WEIGHT: 176 LBS | BODY MASS INDEX: 23.86 KG/M2 | TEMPERATURE: 97.5 F | DIASTOLIC BLOOD PRESSURE: 80 MMHG | HEART RATE: 72 BPM | OXYGEN SATURATION: 97 % | RESPIRATION RATE: 16 BRPM | SYSTOLIC BLOOD PRESSURE: 151 MMHG

## 2019-09-18 DIAGNOSIS — M81.0 OSTEOPOROSIS, UNSPECIFIED OSTEOPOROSIS TYPE, UNSPECIFIED PATHOLOGICAL FRACTURE PRESENCE: Primary | ICD-10-CM

## 2019-09-18 PROCEDURE — 96372 THER/PROPH/DIAG INJ SC/IM: CPT | Performed by: NURSE PRACTITIONER

## 2019-09-18 PROCEDURE — 25010000002 DENOSUMAB 60 MG/ML SOLUTION PREFILLED SYRINGE: Performed by: INTERNAL MEDICINE

## 2019-09-18 RX ADMIN — DENOSUMAB 60 MG: 60 INJECTION SUBCUTANEOUS at 16:03

## 2019-11-04 ENCOUNTER — OFFICE VISIT (OUTPATIENT)
Dept: INTERNAL MEDICINE | Facility: CLINIC | Age: 84
End: 2019-11-04

## 2019-11-04 VITALS
SYSTOLIC BLOOD PRESSURE: 108 MMHG | WEIGHT: 176 LBS | OXYGEN SATURATION: 98 % | HEART RATE: 77 BPM | HEIGHT: 72 IN | RESPIRATION RATE: 16 BRPM | DIASTOLIC BLOOD PRESSURE: 52 MMHG | TEMPERATURE: 98.7 F | BODY MASS INDEX: 23.84 KG/M2

## 2019-11-04 DIAGNOSIS — R73.02 IGT (IMPAIRED GLUCOSE TOLERANCE): ICD-10-CM

## 2019-11-04 DIAGNOSIS — M35.3 PMR (POLYMYALGIA RHEUMATICA) (HCC): Primary | ICD-10-CM

## 2019-11-04 PROCEDURE — 99213 OFFICE O/P EST LOW 20 MIN: CPT | Performed by: INTERNAL MEDICINE

## 2019-11-04 NOTE — PROGRESS NOTES
Subjective   Jim Romero is a 88 y.o. male.     Chief Complaint   Patient presents with   • Med Management         Hyperglycemia   This is a chronic problem. The current episode started more than 1 year ago. Associated symptoms include myalgias. Pertinent negatives include no chills or fatigue.   Pain   This is a chronic problem. The current episode started more than 1 month ago. The problem has been unchanged. Associated symptoms include myalgias. Pertinent negatives include no chills or fatigue.   Arthritis   Presents for follow-up visit. The symptoms have been stable. Affected locations include the right knee, left knee, left DIP and right hip. Pertinent negatives include no diarrhea, dysuria or fatigue.        The following portions of the patient's history were reviewed and updated as appropriate: allergies, current medications, past social history and problem list.    Outpatient Medications Marked as Taking for the 11/4/19 encounter (Office Visit) with Reji Gilman MD   Medication Sig Dispense Refill   • calcium carbonate-cholecalciferol 500-400 MG-UNIT tablet tablet Take  by mouth Daily.     • metFORMIN (GLUCOPHAGE) 500 MG tablet Take 1 tablet twice daily 180 tablet 1   • pantoprazole (PROTONIX) 40 MG EC tablet TAKE ONE TABLET BY MOUTH DAILY 90 tablet 0   • pramipexole (MIRAPEX) 0.5 MG tablet TAKE ONE TABLET BY MOUTH TWICE A  tablet 0   • predniSONE (DELTASONE) 1 MG tablet Take 3 tablets by mouth Every Morning. 270 tablet 1   • tamsulosin (FLOMAX) 0.4 MG capsule 24 hr capsule TAKE ONE CAPSULE BY MOUTH TWICE A  capsule 0       Review of Systems   Constitutional: Negative for chills and fatigue.   Gastrointestinal: Negative for diarrhea.   Genitourinary: Negative for dysuria.   Musculoskeletal: Positive for arthritis, back pain and myalgias.       Objective   Vitals:    11/04/19 1522   BP: 108/52   Pulse: 77   Resp: 16   Temp: 98.7 °F (37.1 °C)   SpO2: 98%          11/04/19  1522   Weight:  79.8 kg (176 lb)    [unfilled]  Body mass index is 23.86 kg/m².      Physical Exam   Constitutional: He appears well-developed and well-nourished.   Musculoskeletal: Normal range of motion. He exhibits no edema, tenderness or deformity.   Skin: Skin is warm and dry.         Problem List Items Addressed This Visit        Nervous and Auditory    PMR (polymyalgia rheumatica) (CMS/[facility]) - Primary        Assessment/Plan   In today for recheck of PMR.  Ache the lower back, hips, knees, thighs.  He states he was stiff and achy but that has cleared up.  No jaw claudication.  Some minor symptoms in the shoulder girdle.  He has been on prednisone 3 mg daily for the past 8 weeks.  Has started on Prolia.  Due for another Prolia injection.  On metformin now.  Sed rate, CRP, glucose, A1c today.  Currently is asymptomatic on 3 mg of prednisone.  Will recheck again in 8 weeks.  Cut prednisone to 2 mg daily today.  He started on prednisone on July 9, 2018.  He is one HPP today.  Just some crackers.           .bmifollowup  Dragon disclaimer:   Much of this encounter note is an electronic transcription/translation of spoken language to printed text. The electronic translation of spoken language may permit erroneous, or at times, nonsensical words or phrases to be inadvertently transcribed; Although I have reviewed the note for such errors, some may still exist.

## 2019-11-05 LAB
CRP SERPL-MCNC: 0.2 MG/DL (ref 0–0.5)
ERYTHROCYTE [SEDIMENTATION RATE] IN BLOOD BY WESTERGREN METHOD: 8 MM/HR (ref 0–20)
GLUCOSE P FAST SERPL-MCNC: 96 MG/DL (ref 65–99)
HBA1C MFR BLD: 6.4 % (ref 4.8–5.6)

## 2019-11-05 RX ORDER — PREDNISONE 1 MG/1
2 TABLET ORAL EVERY MORNING
Qty: 60 TABLET | Refills: 0 | Status: SHIPPED | OUTPATIENT
Start: 2019-11-05 | End: 2020-03-03 | Stop reason: SDUPTHER

## 2019-12-02 RX ORDER — TAMSULOSIN HYDROCHLORIDE 0.4 MG/1
CAPSULE ORAL
Qty: 180 CAPSULE | Refills: 0 | Status: SHIPPED | OUTPATIENT
Start: 2019-12-02 | End: 2020-03-02 | Stop reason: SDUPTHER

## 2019-12-02 RX ORDER — PANTOPRAZOLE SODIUM 40 MG/1
TABLET, DELAYED RELEASE ORAL
Qty: 90 TABLET | Refills: 0 | Status: SHIPPED | OUTPATIENT
Start: 2019-12-02 | End: 2020-03-02 | Stop reason: SDUPTHER

## 2020-01-09 ENCOUNTER — OFFICE VISIT (OUTPATIENT)
Dept: INTERNAL MEDICINE | Facility: CLINIC | Age: 85
End: 2020-01-09

## 2020-01-09 VITALS
RESPIRATION RATE: 16 BRPM | HEART RATE: 76 BPM | BODY MASS INDEX: 23.7 KG/M2 | TEMPERATURE: 97.9 F | DIASTOLIC BLOOD PRESSURE: 58 MMHG | HEIGHT: 72 IN | WEIGHT: 175 LBS | OXYGEN SATURATION: 98 % | SYSTOLIC BLOOD PRESSURE: 128 MMHG

## 2020-01-09 DIAGNOSIS — R73.02 IGT (IMPAIRED GLUCOSE TOLERANCE): ICD-10-CM

## 2020-01-09 DIAGNOSIS — M35.3 PMR (POLYMYALGIA RHEUMATICA) (HCC): Primary | ICD-10-CM

## 2020-01-09 PROCEDURE — 99213 OFFICE O/P EST LOW 20 MIN: CPT | Performed by: INTERNAL MEDICINE

## 2020-01-09 NOTE — PROGRESS NOTES
Subjective   Jim Romero is a 88 y.o. male.     Chief Complaint   Patient presents with   • PMR         Hyperglycemia   This is a chronic problem. The current episode started more than 1 year ago. Associated symptoms include myalgias. Pertinent negatives include no chills or fatigue.   Pain   This is a chronic problem. The current episode started more than 1 month ago. The problem has been unchanged. Associated symptoms include myalgias. Pertinent negatives include no chills or fatigue.   Arthritis   Presents for follow-up visit. The symptoms have been stable. Affected locations include the right knee, left knee, left DIP and right hip. Pertinent negatives include no diarrhea, dysuria or fatigue.        The following portions of the patient's history were reviewed and updated as appropriate: allergies, current medications, past social history and problem list.    Outpatient Medications Marked as Taking for the 1/9/20 encounter (Office Visit) with Reji Gilman MD   Medication Sig Dispense Refill   • calcium carbonate-cholecalciferol 500-400 MG-UNIT tablet tablet Take  by mouth Daily.     • metFORMIN (GLUCOPHAGE) 500 MG tablet Take 1 tablet twice daily 180 tablet 1   • pantoprazole (PROTONIX) 40 MG EC tablet TAKE ONE TABLET BY MOUTH DAILY 90 tablet 0   • pramipexole (MIRAPEX) 0.5 MG tablet TAKE ONE TABLET BY MOUTH TWICE A  tablet 0   • predniSONE (DELTASONE) 1 MG tablet Take 2 tablets by mouth Every Morning. 60 tablet 0   • tamsulosin (FLOMAX) 0.4 MG capsule 24 hr capsule TAKE ONE CAPSULE BY MOUTH TWICE A  capsule 0       Review of Systems   Constitutional: Negative for chills and fatigue.   Gastrointestinal: Negative for diarrhea.   Genitourinary: Negative for dysuria.   Musculoskeletal: Positive for arthritis, back pain and myalgias.       Objective   Vitals:    01/09/20 1538   BP: 128/58   Pulse: 76   Resp: 16   Temp: 97.9 °F (36.6 °C)   SpO2: 98%          01/09/20  1538   Weight: 79.4 kg (175  lb)    [unfilled]  Body mass index is 23.73 kg/m².      Physical Exam   Constitutional: He appears well-developed and well-nourished.   Musculoskeletal: Normal range of motion. He exhibits no edema, tenderness or deformity.   Skin: Skin is warm and dry.         Problem List Items Addressed This Visit        Nervous and Auditory    PMR (polymyalgia rheumatica) (CMS/[facility]) - Primary        Assessment/Plan   In today for recheck of PMR.  Ache the lower back, hips, knees, thighs.  No jaw claudication.  He has been on prednisone 2 mg daily for the past 8 weeks.  Has started on Prolia.  Began 2 days ago with some left shoulder pain.  Worse when he gets up in the morning.  He boosted his prednisone up to 10 mg daily and that seems to have solved the problem.  On metformin now.  Sed rate, CRP, glucose, A1c today.  Will recheck again in 8 weeks.  He started on prednisone on July 9, 2018.  He is 1 HPP today.  Crackers.  Once we see his sed rate and CRP will decide how to handle his current steroid burst.           .bmifollowup  Dragon disclaimer:   Much of this encounter note is an electronic transcription/translation of spoken language to printed text. The electronic translation of spoken language may permit erroneous, or at times, nonsensical words or phrases to be inadvertently transcribed; Although I have reviewed the note for such errors, some may still exist.

## 2020-01-10 LAB
CRP SERPL-MCNC: 0.19 MG/DL (ref 0–0.5)
ERYTHROCYTE [SEDIMENTATION RATE] IN BLOOD BY WESTERGREN METHOD: 7 MM/HR (ref 0–20)
GLUCOSE P FAST SERPL-MCNC: 137 MG/DL (ref 65–99)
HBA1C MFR BLD: 6.3 % (ref 4.8–5.6)

## 2020-02-25 ENCOUNTER — TELEPHONE (OUTPATIENT)
Dept: INTERNAL MEDICINE | Facility: CLINIC | Age: 85
End: 2020-02-25

## 2020-02-25 NOTE — TELEPHONE ENCOUNTER
He is due for Prolia injection. No future appointment has been scheduled. But he states you had decided to stop Prolia injections. Please advise.    He should be back around 3/9/2020 to see me.  I can see that he is scheduled for 3/2/2020.  We can just cancel the Prolia shots for now.  I assume the above message means he has no future Prolia shots scheduled.

## 2020-03-02 ENCOUNTER — OFFICE VISIT (OUTPATIENT)
Dept: INTERNAL MEDICINE | Facility: CLINIC | Age: 85
End: 2020-03-02

## 2020-03-02 VITALS
HEIGHT: 72 IN | WEIGHT: 183 LBS | BODY MASS INDEX: 24.79 KG/M2 | HEART RATE: 74 BPM | SYSTOLIC BLOOD PRESSURE: 122 MMHG | RESPIRATION RATE: 16 BRPM | TEMPERATURE: 97.7 F | OXYGEN SATURATION: 98 % | DIASTOLIC BLOOD PRESSURE: 62 MMHG

## 2020-03-02 DIAGNOSIS — M35.3 PMR (POLYMYALGIA RHEUMATICA) (HCC): Primary | ICD-10-CM

## 2020-03-02 DIAGNOSIS — M75.52 BURSITIS OF LEFT SHOULDER: ICD-10-CM

## 2020-03-02 DIAGNOSIS — R73.02 IGT (IMPAIRED GLUCOSE TOLERANCE): ICD-10-CM

## 2020-03-02 DIAGNOSIS — R53.82 CHRONIC FATIGUE: ICD-10-CM

## 2020-03-02 PROCEDURE — 20610 DRAIN/INJ JOINT/BURSA W/O US: CPT | Performed by: INTERNAL MEDICINE

## 2020-03-02 PROCEDURE — 99213 OFFICE O/P EST LOW 20 MIN: CPT | Performed by: INTERNAL MEDICINE

## 2020-03-02 RX ORDER — TAMSULOSIN HYDROCHLORIDE 0.4 MG/1
1 CAPSULE ORAL 2 TIMES DAILY
Qty: 180 CAPSULE | Refills: 1 | Status: SHIPPED | OUTPATIENT
Start: 2020-03-02 | End: 2020-09-28

## 2020-03-02 RX ORDER — METHYLPREDNISOLONE ACETATE 80 MG/ML
80 INJECTION, SUSPENSION INTRA-ARTICULAR; INTRALESIONAL; INTRAMUSCULAR; SOFT TISSUE
Status: COMPLETED | OUTPATIENT
Start: 2020-03-02 | End: 2020-03-02

## 2020-03-02 RX ORDER — PANTOPRAZOLE SODIUM 40 MG/1
40 TABLET, DELAYED RELEASE ORAL DAILY
Qty: 90 TABLET | Refills: 3 | Status: SHIPPED | OUTPATIENT
Start: 2020-03-02 | End: 2020-03-03

## 2020-03-02 RX ORDER — PRAMIPEXOLE DIHYDROCHLORIDE 0.5 MG/1
0.5 TABLET ORAL 2 TIMES DAILY
Qty: 180 TABLET | Refills: 1 | Status: SHIPPED | OUTPATIENT
Start: 2020-03-02 | End: 2020-08-04 | Stop reason: SDUPTHER

## 2020-03-02 RX ADMIN — METHYLPREDNISOLONE ACETATE 80 MG: 80 INJECTION, SUSPENSION INTRA-ARTICULAR; INTRALESIONAL; INTRAMUSCULAR; SOFT TISSUE at 16:03

## 2020-03-02 NOTE — PATIENT INSTRUCTIONS
Advance Directive    Advance directives are legal documents that let you make choices ahead of time about your health care and medical treatment in case you become unable to communicate for yourself. Advance directives are a way for you to communicate your wishes to family, friends, and health care providers. This can help convey your decisions about end-of-life care if you become unable to communicate.  Discussing and writing advance directives should happen over time rather than all at once. Advance directives can be changed depending on your situation and what you want, even after you have signed the advance directives.  If you do not have an advance directive, some states assign family decision makers to act on your behalf based on how closely you are related to them. Each state has its own laws regarding advance directives. You may want to check with your health care provider, , or state representative about the laws in your state. There are different types of advance directives, such as:  · Medical power of .  · Living will.  · Do not resuscitate (DNR) or do not attempt resuscitation (DNAR) order.  Health care proxy and medical power of   A health care proxy, also called a health care agent, is a person who is appointed to make medical decisions for you in cases in which you are unable to make the decisions yourself. Generally, people choose someone they know well and trust to represent their preferences. Make sure to ask this person for an agreement to act as your proxy. A proxy may have to exercise judgment in the event of a medical decision for which your wishes are not known.  A medical power of  is a legal document that names your health care proxy. Depending on the laws in your state, after the document is written, it may also need to be:  · Signed.  · Notarized.  · Dated.  · Copied.  · Witnessed.  · Incorporated into your medical record.  You may also want to appoint  someone to manage your financial affairs in a situation in which you are unable to do so. This is called a durable power of  for finances. It is a separate legal document from the durable power of  for health care. You may choose the same person or someone different from your health care proxy to act as your agent in financial matters.  If you do not appoint a proxy, or if there is a concern that the proxy is not acting in your best interests, a court-appointed guardian may be designated to act on your behalf.  Living will  A living will is a set of instructions documenting your wishes about medical care when you cannot express them yourself. Health care providers should keep a copy of your living will in your medical record. You may want to give a copy to family members or friends. To alert caregivers in case of an emergency, you can place a card in your wallet to let them know that you have a living will and where they can find it. A living will is used if you become:  · Terminally ill.  · Incapacitated.  · Unable to communicate or make decisions.  Items to consider in your living will include:  · The use or non-use of life-sustaining equipment, such as dialysis machines and breathing machines (ventilators).  · A DNR or DNAR order, which is the instruction not to use cardiopulmonary resuscitation (CPR) if breathing or heartbeat stops.  · The use or non-use of tube feeding.  · Withholding of food and fluids.  · Comfort (palliative) care when the goal becomes comfort rather than a cure.  · Organ and tissue donation.  A living will does not give instructions for distributing your money and property if you should pass away. It is recommended that you seek the advice of a  when writing a will. Decisions about taxes, beneficiaries, and asset distribution will be legally binding. This process can relieve your family and friends of any concerns surrounding disputes or questions that may come up about  the distribution of your assets.  DNR or DNAR  A DNR or DNAR order is a request not to have CPR in the event that your heart stops beating or you stop breathing. If a DNR or DNAR order has not been made and shared, a health care provider will try to help any patient whose heart has stopped or who has stopped breathing. If you plan to have surgery, talk with your health care provider about how your DNR or DNAR order will be followed if problems occur.  Summary  · Advance directives are the legal documents that allow you to make choices ahead of time about your health care and medical treatment in case you become unable to communicate for yourself.  · The process of discussing and writing advance directives should happen over time. You can change the advance directives, even after you have signed them.  · Advance directives include DNR or DNAR orders, living pelayo, and designating an agent as your medical power of .  This information is not intended to replace advice given to you by your health care provider. Make sure you discuss any questions you have with your health care provider.  Document Released: 03/26/2009 Document Revised: 11/06/2017 Document Reviewed: 11/06/2017  [employer] Patient Education © 2020 [employer]

## 2020-03-02 NOTE — PROGRESS NOTES
Subjective   Jim Romero is a 88 y.o. male.     Chief Complaint   Patient presents with   • Hyperglycemia   • Polymyalgia Rheumatic         Hyperglycemia   This is a chronic problem. The current episode started more than 1 year ago. Associated symptoms include myalgias. Pertinent negatives include no chills or fatigue.   Pain   This is a chronic problem. The current episode started more than 1 month ago. The problem has been unchanged. Associated symptoms include myalgias. Pertinent negatives include no chills or fatigue.   Arthritis   Presents for follow-up visit. The symptoms have been stable. Affected locations include the right knee, left knee, left DIP and right hip. Pertinent negatives include no diarrhea, dysuria or fatigue.        The following portions of the patient's history were reviewed and updated as appropriate: allergies, current medications, past social history and problem list.    Outpatient Medications Marked as Taking for the 3/2/20 encounter (Office Visit) with Reji Gilman MD   Medication Sig Dispense Refill   • calcium carbonate-cholecalciferol 500-400 MG-UNIT tablet tablet Take 1 tablet by mouth Daily.     • metFORMIN (GLUCOPHAGE) 500 MG tablet Take 1 tablet twice daily 180 tablet 1   • pantoprazole (PROTONIX) 40 MG EC tablet Take 1 tablet by mouth Daily. 90 tablet 3   • pramipexole (MIRAPEX) 0.5 MG tablet Take 1 tablet by mouth 2 (Two) Times a Day. 180 tablet 1   • predniSONE (DELTASONE) 1 MG tablet Take 2 tablets by mouth Every Morning. 60 tablet 0   • tamsulosin (FLOMAX) 0.4 MG capsule 24 hr capsule Take 1 capsule by mouth 2 (Two) Times a Day. 180 capsule 1   • [DISCONTINUED] metFORMIN (GLUCOPHAGE) 500 MG tablet Take 1 tablet twice daily 180 tablet 1   • [DISCONTINUED] pantoprazole (PROTONIX) 40 MG EC tablet TAKE ONE TABLET BY MOUTH DAILY 90 tablet 0   • [DISCONTINUED] pramipexole (MIRAPEX) 0.5 MG tablet TAKE ONE TABLET BY MOUTH TWICE A  tablet 0   • [DISCONTINUED] tamsulosin  (FLOMAX) 0.4 MG capsule 24 hr capsule TAKE ONE CAPSULE BY MOUTH TWICE A  capsule 0       Review of Systems   Constitutional: Negative for chills and fatigue.   Gastrointestinal: Negative for diarrhea.   Genitourinary: Negative for dysuria.   Musculoskeletal: Positive for arthritis, back pain and myalgias.       Objective   Vitals:    03/02/20 1532   BP: 122/62   Pulse: 74   Resp: 16   Temp: 97.7 °F (36.5 °C)   SpO2: 98%          03/02/20  1532   Weight: 83 kg (183 lb)    [unfilled]  Body mass index is 24.81 kg/m².      Physical Exam   Constitutional: He appears well-developed and well-nourished.   Musculoskeletal: Normal range of motion. He exhibits no edema, tenderness or deformity.   Skin: Skin is warm and dry.         Problem List Items Addressed This Visit        Endocrine    IGT (impaired glucose tolerance)       Nervous and Auditory    PMR (polymyalgia rheumatica) (CMS/[facility]) - Primary       Other    Chronic fatigue        Assessment/Plan   In today for recheck of PMR.  No further ache in the lower back, hips, knees, thighs.  No jaw claudication.  He has been on prednisone 2 mg daily for the past 8 weeks.  Has started on Prolia.  Began 2 months ago with some left shoulder pain.  Worse when he gets up in the morning.  On metformin now.  Sed rate, CRP, glucose, A1c today.  Will recheck again in 8 weeks.  He started on prednisone on July 9, 2018.  He is 1/2 HPP today.  We will inject left shoulder with 40 mg Depo-Medrol today.  I think he is having a primary rotator cuff tendinitis and that his PMR is doing okay.  We will await his lab work to determine what to do with his prednisone dose.  Hopefully will cut down to 1 mg daily.  Follow-up in 2 months.  He is holding off on Prolia shot since he is nearing the end of his cycle of prednisone.  He has had some lightheaded spells and weak spells.  Some fatigue spells.  It may be his blood sugars dropping as his prednisone is reducing.  May need to cut down  on his metformin.  Currently on 500 mg once daily.  Will discontinue metformin if blood sugar is okay today.    Injection Tendon or Ligament  Date/Time: 3/2/2020 4:03 PM  Performed by: Reji Gilman MD  Authorized by: Reji Gilman MD   Local anesthesia used: yes  Anesthesia: local infiltration    Anesthesia:  Local anesthesia used: yes  Local Anesthetic: lidocaine 1% without epinephrine  Anesthetic total: 1 mL    Sedation:  Patient sedated: no    Patient tolerance: Patient tolerated the procedure well with no immediate complications  Comments: Total of half cc (40 mg) Solu-Medrol use.  Used 2 cc of plain lidocaine 1%, NDC 00189-632-28.  Batch CLC [id number].  Expiration date 7/21.  Medications administered: 80 mg methylPREDNISolone acetate 80 MG/ML                   .bmifollowup  Dragon disclaimer:   Much of this encounter note is an electronic transcription/translation of spoken language to printed text. The electronic translation of spoken language may permit erroneous, or at times, nonsensical words or phrases to be inadvertently transcribed; Although I have reviewed the note for such errors, some may still exist.

## 2020-03-03 LAB
CRP SERPL-MCNC: 0.14 MG/DL (ref 0–0.5)
ERYTHROCYTE [SEDIMENTATION RATE] IN BLOOD BY WESTERGREN METHOD: 6 MM/HR (ref 0–20)
GLUCOSE P FAST SERPL-MCNC: 141 MG/DL (ref 65–99)
HBA1C MFR BLD: 6.6 % (ref 4.8–5.6)

## 2020-03-03 RX ORDER — PREDNISONE 1 MG/1
1 TABLET ORAL DAILY
Qty: 30 TABLET | Refills: 0 | Status: SHIPPED | OUTPATIENT
Start: 2020-03-03 | End: 2020-08-04

## 2020-03-03 RX ORDER — PANTOPRAZOLE SODIUM 40 MG/1
TABLET, DELAYED RELEASE ORAL
Qty: 90 TABLET | Refills: 3 | Status: SHIPPED | OUTPATIENT
Start: 2020-03-03 | End: 2021-05-03

## 2020-03-19 ENCOUNTER — TELEPHONE (OUTPATIENT)
Dept: INTERNAL MEDICINE | Facility: CLINIC | Age: 85
End: 2020-03-19

## 2020-04-03 ENCOUNTER — DOCUMENTATION (OUTPATIENT)
Dept: PHYSICAL THERAPY | Facility: HOSPITAL | Age: 85
End: 2020-04-03

## 2020-04-03 DIAGNOSIS — M25.511 ACUTE PAIN OF RIGHT SHOULDER: Primary | ICD-10-CM

## 2020-04-03 DIAGNOSIS — R29.3 POOR POSTURE: ICD-10-CM

## 2020-04-03 NOTE — THERAPY DISCHARGE NOTE
Outpatient Physical Therapy Discharge Summary         Patient Name: Jim Romero  : 3/17/1931  MRN: [id number]    Today's Date: 4/3/2020    Visit Dx:    ICD-10-CM ICD-9-CM   1. Acute pain of right shoulder M25.511 719.41   2. Poor posture R29.3 781.92       PT OP Goals     Row Name 20 1000          PT Short Term Goals    STG Date to Achieve  19  -RS     STG 1  Pt will be independent and verbalized good understanding of initial HEP to improve ability to manage pain, as well as improve strength and ROM.  -RS     STG 1 Progress  Met  -RS     STG 2  Pt will improve R shoulder flexion to at least 165 deg to improve ability to reach overhead.   -RS     STG 2 Progress  Not Met  -RS     STG 3  Pt will demo at least 4/5 R shouder strength to improve ability to reach overhead  -RS     STG 3 Progress  Met  -RS        Long Term Goals    LTG Date to Achieve  19  -RS     LTG 1  Pt will be independent and verbalized good understanding of advanced HEP to improve ability to manage pain, as well as improve strength and ROM.  -RS     LTG 1 Progress  Met  -RS     LTG 2  Pt will report </=1/10 pain in R shoulder with overhead activities to improve participation in ADLs.   -RS     LTG 2 Progress  Met  -RS     LTG 3  Pt will improve DASH score to at least <10% perceived disability to show overall improved quality of life.  -RS     LTG 3 Progress  Met  -RS       User Key  (r) = Recorded By, (t) = Taken By, (c) = Cosigned By    Initials Name Provider Type    Flor Latif PT Physical Therapist          OP PT Discharge Summary  Date of Discharge: 19  Reason for Discharge: Independent, At baseline function  Outcomes Achieved: Patient able to partially acheive established goals  Discharge Destination: Home with home program  Discharge Instructions/Additional Comments: see previous assessment- pt discharged to home program      Time Calculation:                    Flor Richards PT  4/3/2020

## 2020-05-04 ENCOUNTER — OFFICE VISIT (OUTPATIENT)
Dept: INTERNAL MEDICINE | Facility: CLINIC | Age: 85
End: 2020-05-04

## 2020-05-04 DIAGNOSIS — M35.3 PMR (POLYMYALGIA RHEUMATICA) (HCC): Primary | ICD-10-CM

## 2020-05-04 DIAGNOSIS — R73.02 IGT (IMPAIRED GLUCOSE TOLERANCE): ICD-10-CM

## 2020-05-04 PROCEDURE — 99213 OFFICE O/P EST LOW 20 MIN: CPT | Performed by: INTERNAL MEDICINE

## 2020-05-08 ENCOUNTER — OFFICE VISIT (OUTPATIENT)
Dept: INTERNAL MEDICINE | Facility: CLINIC | Age: 85
End: 2020-05-08

## 2020-05-08 DIAGNOSIS — Z01.89 LABORATORY PROCEDURE: Primary | ICD-10-CM

## 2020-05-09 LAB
CRP SERPL-MCNC: 0.13 MG/DL (ref 0–0.5)
ERYTHROCYTE [SEDIMENTATION RATE] IN BLOOD BY WESTERGREN METHOD: 4 MM/HR (ref 0–20)
GLUCOSE P FAST SERPL-MCNC: 189 MG/DL (ref 65–99)
HBA1C MFR BLD: 6.19 % (ref 4.8–5.6)

## 2020-08-04 ENCOUNTER — HOSPITAL ENCOUNTER (OUTPATIENT)
Dept: GENERAL RADIOLOGY | Facility: HOSPITAL | Age: 85
Discharge: HOME | End: 2020-08-04
Admitting: INTERNAL MEDICINE

## 2020-08-04 ENCOUNTER — OFFICE VISIT (OUTPATIENT)
Dept: INTERNAL MEDICINE | Facility: CLINIC | Age: 85
End: 2020-08-04

## 2020-08-04 VITALS
OXYGEN SATURATION: 98 % | WEIGHT: 177.6 LBS | HEART RATE: 71 BPM | SYSTOLIC BLOOD PRESSURE: 128 MMHG | TEMPERATURE: 98.4 F | HEIGHT: 72 IN | RESPIRATION RATE: 18 BRPM | DIASTOLIC BLOOD PRESSURE: 66 MMHG | BODY MASS INDEX: 24.06 KG/M2

## 2020-08-04 DIAGNOSIS — M25.512 CHRONIC LEFT SHOULDER PAIN: ICD-10-CM

## 2020-08-04 DIAGNOSIS — R73.02 IGT (IMPAIRED GLUCOSE TOLERANCE): ICD-10-CM

## 2020-08-04 DIAGNOSIS — G89.29 CHRONIC LEFT SHOULDER PAIN: ICD-10-CM

## 2020-08-04 DIAGNOSIS — K21.9 GASTROESOPHAGEAL REFLUX DISEASE, ESOPHAGITIS PRESENCE NOT SPECIFIED: ICD-10-CM

## 2020-08-04 DIAGNOSIS — G25.81 RLS (RESTLESS LEGS SYNDROME): ICD-10-CM

## 2020-08-04 DIAGNOSIS — M35.3 PMR (POLYMYALGIA RHEUMATICA) (HCC): Primary | ICD-10-CM

## 2020-08-04 DIAGNOSIS — Z79.899 MEDICATION MANAGEMENT: ICD-10-CM

## 2020-08-04 PROCEDURE — 99214 OFFICE O/P EST MOD 30 MIN: CPT | Performed by: INTERNAL MEDICINE

## 2020-08-04 PROCEDURE — 96372 THER/PROPH/DIAG INJ SC/IM: CPT | Performed by: INTERNAL MEDICINE

## 2020-08-04 PROCEDURE — G0439 PPPS, SUBSEQ VISIT: HCPCS | Performed by: INTERNAL MEDICINE

## 2020-08-04 PROCEDURE — 73030 X-RAY EXAM OF SHOULDER: CPT

## 2020-08-04 RX ORDER — PRAMIPEXOLE DIHYDROCHLORIDE 1.5 MG/1
1.5 TABLET ORAL NIGHTLY
Qty: 90 TABLET | Refills: 1 | Status: SHIPPED | OUTPATIENT
Start: 2020-08-04 | End: 2021-01-21

## 2020-08-04 RX ORDER — METHYLPREDNISOLONE ACETATE 40 MG/ML
80 INJECTION, SUSPENSION INTRA-ARTICULAR; INTRALESIONAL; INTRAMUSCULAR; SOFT TISSUE
Status: COMPLETED | OUTPATIENT
Start: 2020-08-04 | End: 2020-08-04

## 2020-08-04 RX ADMIN — METHYLPREDNISOLONE ACETATE 80 MG: 40 INJECTION, SUSPENSION INTRA-ARTICULAR; INTRALESIONAL; INTRAMUSCULAR; SOFT TISSUE at 16:20

## 2020-08-04 NOTE — PROGRESS NOTES
The ABCs of the Annual Wellness Visit  Subsequent Medicare Wellness Visit    Chief Complaint   Patient presents with   • Shoulder Pain     would like a cortisone injection       Subjective   History of Present Illness:  Jim Romero is a 89 y.o. male who presents for a Subsequent Medicare Wellness Visit.    HEALTH RISK ASSESSMENT    Recent Hospitalizations:  No hospitalization(s) within the last year.    Current Medical Providers:  Patient Care Team:  Reji Gilman MD as PCP - General (Internal Medicine)  Reji Gilman MD as PCP - Internal Medicine (Internal Medicine)  Reji Gilman MD as PCP - Claims Attributed    Smoking Status:  Social History     Tobacco Use   Smoking Status Former Smoker   • Packs/day: 1.50   • Types: Cigarettes   • Start date:    • Last attempt to quit:    • Years since quittin.6   Smokeless Tobacco Never Used       Alcohol Consumption:  Social History     Substance and Sexual Activity   Alcohol Use Yes   • Frequency: 2-4 times a month   • Drinks per session: 1 or 2   • Binge frequency: Never    Comment: 1-2 x month       Depression Screen:   PHQ-2/PHQ-9 Depression Screening 2020   Little interest or pleasure in doing things 0   Feeling down, depressed, or hopeless 0   Trouble falling or staying asleep, or sleeping too much -   Feeling tired or having little energy -   Poor appetite or overeating -   Feeling bad about yourself - or that you are a failure or have let yourself or your family down -   Trouble concentrating on things, such as reading the newspaper or watching television -   Moving or speaking so slowly that other people could have noticed. Or the opposite - being so fidgety or restless that you have been moving around a lot more than usual -   Thoughts that you would be better off dead, or of hurting yourself in some way -   Total Score 0   If you checked off any problems, how difficult have these problems made it for you to do your work, take care of  things at home, or get along with other people? -       Fall Risk Screen:  LICO Fall Risk Assessment was completed, and patient is at LOW risk for falls.Assessment completed on:8/4/2020    Health Habits and Functional and Cognitive Screening:  Functional & Cognitive Status 8/4/2020   Do you have difficulty preparing food and eating? No   Do you have difficulty bathing yourself, getting dressed or grooming yourself? No   Do you have difficulty using the toilet? No   Do you have difficulty moving around from place to place? No   Do you have trouble with steps or getting out of a bed or a chair? Yes   Current Diet Well Balanced Diet   Dental Exam Up to date   Eye Exam Up to date   Exercise (times per week) 2 times per week   Current Exercise Activities Include Gardening   Do you need help using the phone?  No   Are you deaf or do you have serious difficulty hearing?  Yes   Do you need help with transportation? No   Do you need help shopping? No   Do you need help preparing meals?  No   Do you need help with housework?  No   Do you need help with laundry? No   Do you need help taking your medications? No   Do you need help managing money? No   Do you ever drive or ride in a car without wearing a seat belt? -   Have you felt unusual stress, anger or loneliness in the last month? No   Who do you live with? Spouse   If you need help, do you have trouble finding someone available to you? No   Have you been bothered in the last four weeks by sexual problems? -   Do you have difficulty concentrating, remembering or making decisions? No         Does the patient have evidence of cognitive impairment? No    Asprin use counseling:Does not need ASA but is currently taking (advised patient that ASA is not indicated and patient chooses to stop it)    Age-appropriate Screening Schedule:  Refer to the list below for future screening recommendations based on patient's age, sex and/or medical conditions. Orders for these recommended  tests are listed in the plan section. The patient has been provided with a written plan.    Health Maintenance   Topic Date Due   • DXA SCAN  07/23/2020   • INFLUENZA VACCINE  08/01/2020   • TDAP/TD VACCINES (2 - Td) 05/17/2028   • ZOSTER VACCINE  Discontinued          The following portions of the patient's history were reviewed and updated as appropriate: allergies, current medications, past family history, past medical history, past social history, past surgical history and problem list.    Outpatient Medications Prior to Visit   Medication Sig Dispense Refill   • calcium carbonate-cholecalciferol 500-400 MG-UNIT tablet tablet Take 1 tablet by mouth Daily.     • metFORMIN (GLUCOPHAGE) 500 MG tablet Take 1 tablet twice daily (Patient taking differently: Take 500 mg by mouth Daily With Breakfast. Take 1 tablet twice daily) 180 tablet 1   • pantoprazole (PROTONIX) 40 MG EC tablet TAKE ONE TABLET BY MOUTH DAILY 90 tablet 3   • pramipexole (MIRAPEX) 0.5 MG tablet Take 1 tablet by mouth 2 (Two) Times a Day. 180 tablet 1   • tamsulosin (FLOMAX) 0.4 MG capsule 24 hr capsule Take 1 capsule by mouth 2 (Two) Times a Day. 180 capsule 1   • Aspirin Buf,CaCarb-MgCarb-MgO, 81 MG tablet Take 81 mg by mouth Daily.     • predniSONE (DELTASONE) 1 MG tablet Take 1 tablet by mouth Daily. 30 tablet 0     No facility-administered medications prior to visit.        Patient Active Problem List   Diagnosis   • RLS (restless legs syndrome)   • BPH (benign prostatic hyperplasia)   • GERD (gastroesophageal reflux disease)   • Sciatica   • Chronic fatigue   • IGT (impaired glucose tolerance)   • PMR (polymyalgia rheumatica) (CMS/[facility])   • Osteoporosis       Advanced Care Planning:  ACP discussion was held with the patient during this visit. Patient has an advance directive in EMR which is still valid.     Review of Systems    Compared to one year ago, the patient feels his physical health is the same.  Compared to one year ago, the patient  "feels his mental health is the same.    Reviewed chart for potential of high risk medication in the elderly: no  Reviewed chart for potential of harmful drug interactions in the elderly:no    Objective         Vitals:    08/04/20 1452   BP: 128/66   BP Location: Right arm   Patient Position: Sitting   Cuff Size: Adult   Pulse: 71   Resp: 18   Temp: 98.4 °F (36.9 °C)   SpO2: 98%   Weight: 80.6 kg (177 lb 9.6 oz)   Height: 182.9 cm (72.01\")   PainSc:   2   PainLoc: Shoulder       Body mass index is 24.08 kg/m².  Discussed the patient's BMI with him. The BMI is in the acceptable range.    Physical Exam    Lab Results   Component Value Date    HGBA1C 6.19 (H) 05/08/2020        Assessment/Plan   Medicare Risks and Personalized Health Plan  CMS Preventative Services Quick Reference  Advance Directive Discussion    The above risks/problems have been discussed with the patient.  Pertinent information has been shared with the patient in the After Visit Summary.  Follow up plans and orders are seen below in the Assessment/Plan Section.    There are no diagnoses linked to this encounter.  Follow Up:  No follow-ups on file.     An After Visit Summary and PPPS were given to the patient.   2          "

## 2020-08-04 NOTE — PROGRESS NOTES
Subjective   Jim Romero is a 89 y.o. male.     Chief Complaint   Patient presents with   • Shoulder Pain     would like a cortisone injection         Hyperglycemia   This is a chronic problem. The current episode started more than 1 year ago. Associated symptoms include arthralgias ( left shoulder pain) and myalgias. Pertinent negatives include no chest pain, chills, coughing or fatigue.   Pain   This is a chronic problem. The current episode started more than 1 month ago. The problem has been unchanged. Associated symptoms include arthralgias ( left shoulder pain) and myalgias. Pertinent negatives include no chest pain, chills, coughing or fatigue.   Arthritis   Presents for follow-up visit. The symptoms have been stable. Affected locations include the right knee, left knee, left DIP and right hip. Pertinent negatives include no diarrhea, dysuria or fatigue.        The following portions of the patient's history were reviewed and updated as appropriate: allergies, current medications, past social history and problem list.    Outpatient Medications Marked as Taking for the 8/4/20 encounter (Office Visit) with Reji Gilman MD   Medication Sig Dispense Refill   • calcium carbonate-cholecalciferol 500-400 MG-UNIT tablet tablet Take 1 tablet by mouth Daily.     • metFORMIN (GLUCOPHAGE) 500 MG tablet Take 1 tablet twice daily (Patient taking differently: Take 500 mg by mouth Daily With Breakfast. Take 1 tablet twice daily) 180 tablet 1   • pantoprazole (PROTONIX) 40 MG EC tablet TAKE ONE TABLET BY MOUTH DAILY 90 tablet 3   • pramipexole (MIRAPEX) 0.5 MG tablet Take 1 tablet by mouth 2 (Two) Times a Day. 180 tablet 1   • tamsulosin (FLOMAX) 0.4 MG capsule 24 hr capsule Take 1 capsule by mouth 2 (Two) Times a Day. 180 capsule 1       Review of Systems   Constitutional: Negative for chills and fatigue.   Respiratory: Negative for cough, shortness of breath and wheezing.    Cardiovascular: Negative for chest pain,  palpitations and leg swelling.   Gastrointestinal: Negative for diarrhea.   Genitourinary: Negative for dysuria.   Musculoskeletal: Positive for arthralgias ( left shoulder pain), arthritis, back pain and myalgias.       Objective   Vitals:    08/04/20 1452   BP: 128/66   Pulse: 71   Resp: 18   Temp: 98.4 °F (36.9 °C)   SpO2: 98%          08/04/20  1452   Weight: 80.6 kg (177 lb 9.6 oz)    [unfilled]  Body mass index is 24.08 kg/m².      Physical Exam   Constitutional: He appears well-developed and well-nourished. No distress.   Cardiovascular: Normal rate and regular rhythm. Exam reveals no friction rub.   Murmur heard.   Crescendo decrescendo systolic murmur is present with a grade of 1/6.  Grade 1-2 WAGNER along the left midsternal border and pulmonic area   Pulmonary/Chest: Effort normal and breath sounds normal. No stridor. No respiratory distress. He has no wheezes. He has no rales.   Musculoskeletal: Normal range of motion. He exhibits no edema, tenderness or deformity.   Skin: He is not diaphoretic.   Psychiatric: He has a normal mood and affect. His behavior is normal. Judgment and thought content normal.         Problem List Items Addressed This Visit        Digestive    GERD (gastroesophageal reflux disease)       Endocrine    IGT (impaired glucose tolerance)       Nervous and Auditory    PMR (polymyalgia rheumatica) (CMS/[facility]) - Primary       Other    RLS (restless legs syndrome)        Assessment/Plan   In today for recheck of PMR.  No further ache in the lower back, hips, knees, thighs.  No jaw claudication.  Some left shoulder pain persists.  He has been off of prednisone for about 3 months now.  Now off of Prolia.  Began 6 months ago with the left shoulder pain.   He failed an injection 5 months ago.  He would like to try another 1.  On metformin now.  Sed rate, CRP, glucose, A1c every 3 months.  Annual lab work today August 2020.  That will include CBC, CMP, A1c, CRP, sed rate.  Will recheck again  in 12 weeks.  He started on prednisone on July 9, 2018.  Lightheaded spells have resolved.  Annual wellness visit today August 2020.  Left shoulder injected with 80 mg Depo-Medrol and 2 cc plain lidocaine today.  There is very little tenderness in the shoulder on exam.  Good range of motion.  He is 4 HPP today.  We will x-ray the left shoulder today to see if there is any arthritic changes.    Injection Tendon or Ligament  Date/Time: 8/4/2020 4:20 PM  Performed by: Reji Gilman MD  Authorized by: Reji Gilman MD   Local anesthesia used: no    Anesthesia:  Local anesthesia used: no    Sedation:  Patient sedated: no    Patient tolerance: Patient tolerated the procedure well with no immediate complications  Comments: 1 cc plain lidocaine added to the Depo-Medrol.  NDC #55 150-161-02.  Lot number [id number].  Expiration 11/21.  Tolerated the procedure well.  Pain seemed improved following the procedure.  No localized tenderness on exam.  Medications administered: 80 mg methylPREDNISolone acetate 40 MG/ML                 Carly disclaimer:   Much of this encounter note is an electronic transcription/translation of spoken language to printed text. The electronic translation of spoken language may permit erroneous, or at times, nonsensical words or phrases to be inadvertently transcribed; Although I have reviewed the note for such errors, some may still exist.

## 2020-08-05 LAB
ALBUMIN SERPL-MCNC: 4.2 G/DL (ref 3.5–5.2)
ALBUMIN/GLOB SERPL: 2 G/DL
ALP SERPL-CCNC: 45 U/L (ref 39–117)
ALT SERPL-CCNC: 9 U/L (ref 1–41)
AST SERPL-CCNC: 12 U/L (ref 1–40)
BASOPHILS # BLD AUTO: 0.08 10*3/MM3 (ref 0–0.2)
BASOPHILS NFR BLD AUTO: 0.9 % (ref 0–1.5)
BILIRUB SERPL-MCNC: 0.5 MG/DL (ref 0–1.2)
BUN SERPL-MCNC: 20 MG/DL (ref 8–23)
BUN/CREAT SERPL: 18.7 (ref 7–25)
CALCIUM SERPL-MCNC: 9.5 MG/DL (ref 8.6–10.5)
CHLORIDE SERPL-SCNC: 102 MMOL/L (ref 98–107)
CO2 SERPL-SCNC: 28.8 MMOL/L (ref 22–29)
CREAT SERPL-MCNC: 1.07 MG/DL (ref 0.76–1.27)
CRP SERPL-MCNC: 0.28 MG/DL (ref 0–0.5)
EOSINOPHIL # BLD AUTO: 0.12 10*3/MM3 (ref 0–0.4)
EOSINOPHIL NFR BLD AUTO: 1.3 % (ref 0.3–6.2)
ERYTHROCYTE [DISTWIDTH] IN BLOOD BY AUTOMATED COUNT: 14.3 % (ref 12.3–15.4)
ERYTHROCYTE [SEDIMENTATION RATE] IN BLOOD BY WESTERGREN METHOD: 7 MM/HR (ref 0–20)
GLOBULIN SER CALC-MCNC: 2.1 GM/DL
GLUCOSE SERPL-MCNC: 98 MG/DL (ref 65–99)
HBA1C MFR BLD: 6.2 % (ref 4.8–5.6)
HCT VFR BLD AUTO: 38.3 % (ref 37.5–51)
HGB BLD-MCNC: 12 G/DL (ref 13–17.7)
IMM GRANULOCYTES # BLD AUTO: 0.03 10*3/MM3 (ref 0–0.05)
IMM GRANULOCYTES NFR BLD AUTO: 0.3 % (ref 0–0.5)
LYMPHOCYTES # BLD AUTO: 1.8 10*3/MM3 (ref 0.7–3.1)
LYMPHOCYTES NFR BLD AUTO: 19.7 % (ref 19.6–45.3)
MCH RBC QN AUTO: 25.9 PG (ref 26.6–33)
MCHC RBC AUTO-ENTMCNC: 31.3 G/DL (ref 31.5–35.7)
MCV RBC AUTO: 82.7 FL (ref 79–97)
MONOCYTES # BLD AUTO: 0.63 10*3/MM3 (ref 0.1–0.9)
MONOCYTES NFR BLD AUTO: 6.9 % (ref 5–12)
NEUTROPHILS # BLD AUTO: 6.46 10*3/MM3 (ref 1.7–7)
NEUTROPHILS NFR BLD AUTO: 70.9 % (ref 42.7–76)
NRBC BLD AUTO-RTO: 0 /100 WBC (ref 0–0.2)
PLATELET # BLD AUTO: 244 10*3/MM3 (ref 140–450)
POTASSIUM SERPL-SCNC: 4.5 MMOL/L (ref 3.5–5.2)
PROT SERPL-MCNC: 6.3 G/DL (ref 6–8.5)
RBC # BLD AUTO: 4.63 10*6/MM3 (ref 4.14–5.8)
SODIUM SERPL-SCNC: 139 MMOL/L (ref 136–145)
WBC # BLD AUTO: 9.12 10*3/MM3 (ref 3.4–10.8)

## 2020-09-28 RX ORDER — TAMSULOSIN HYDROCHLORIDE 0.4 MG/1
CAPSULE ORAL
Qty: 180 CAPSULE | Refills: 1 | Status: SHIPPED | OUTPATIENT
Start: 2020-09-28 | End: 2021-04-12

## 2020-11-02 ENCOUNTER — TELEMEDICINE (OUTPATIENT)
Dept: INTERNAL MEDICINE | Facility: CLINIC | Age: 85
End: 2020-11-02

## 2020-11-02 VITALS — SYSTOLIC BLOOD PRESSURE: 140 MMHG | DIASTOLIC BLOOD PRESSURE: 69 MMHG

## 2020-11-02 DIAGNOSIS — M81.0 OSTEOPOROSIS, UNSPECIFIED OSTEOPOROSIS TYPE, UNSPECIFIED PATHOLOGICAL FRACTURE PRESENCE: ICD-10-CM

## 2020-11-02 DIAGNOSIS — G25.81 RLS (RESTLESS LEGS SYNDROME): ICD-10-CM

## 2020-11-02 DIAGNOSIS — K21.9 GASTROESOPHAGEAL REFLUX DISEASE, UNSPECIFIED WHETHER ESOPHAGITIS PRESENT: ICD-10-CM

## 2020-11-02 DIAGNOSIS — R73.02 IGT (IMPAIRED GLUCOSE TOLERANCE): ICD-10-CM

## 2020-11-02 DIAGNOSIS — M35.3 PMR (POLYMYALGIA RHEUMATICA) (HCC): Primary | ICD-10-CM

## 2020-11-02 PROCEDURE — 99213 OFFICE O/P EST LOW 20 MIN: CPT | Performed by: INTERNAL MEDICINE

## 2020-11-02 NOTE — PROGRESS NOTES
Subjective   Jim Romero is a 89 y.o. male.     Chief Complaint   Patient presents with   • Hyperglycemia   • PMR   • Restless Legs Syndrome         Hyperglycemia  This is a chronic problem. The current episode started more than 1 year ago. Associated symptoms include arthralgias ( left shoulder pain) and myalgias. Pertinent negatives include no chest pain, chills, coughing or fatigue.   Pain  This is a chronic problem. The current episode started more than 1 month ago. The problem has been unchanged. Associated symptoms include arthralgias ( left shoulder pain) and myalgias. Pertinent negatives include no chest pain, chills, coughing or fatigue.   Arthritis  Presents for follow-up visit. The symptoms have been stable. Affected locations include the right knee, left knee, left DIP and right hip. Pertinent negatives include no diarrhea or fatigue.        The following portions of the patient's history were reviewed and updated as appropriate: allergies, current medications, past social history and problem list.    Outpatient Medications Marked as Taking for the 11/2/20 encounter (Telemedicine) with Reji Gilman MD   Medication Sig Dispense Refill   • Aspirin Buf,CaCarb-MgCarb-MgO, 81 MG tablet Take 81 mg by mouth Daily.     • calcium carbonate-cholecalciferol 500-400 MG-UNIT tablet tablet Take 1 tablet by mouth Daily.     • pantoprazole (PROTONIX) 40 MG EC tablet TAKE ONE TABLET BY MOUTH DAILY 90 tablet 3   • pramipexole (MIRAPEX) 1.5 MG tablet Take 1 tablet by mouth Every Night. 90 tablet 1   • tamsulosin (FLOMAX) 0.4 MG capsule 24 hr capsule TAKE ONE CAPSULE BY MOUTH TWICE A  capsule 1       Review of Systems   Constitutional: Negative for chills and fatigue.   Respiratory: Negative for cough, shortness of breath and wheezing.    Cardiovascular: Negative for chest pain, palpitations and leg swelling.   Gastrointestinal: Negative for constipation and diarrhea.   Musculoskeletal: Positive for arthralgias  ( left shoulder pain), arthritis, back pain and myalgias.       Objective   There were no vitals filed for this visit.   There were no vitals filed for this visit. [unfilled]  There is no height or weight on file to calculate BMI.      Physical Exam   Pulmonary/Chest: Effort normal.   Abdominal: Normal appearance.   Neurological: He is alert.   Psychiatric: His behavior is normal. Mood, judgment and thought content normal.         Problems Addressed this Visit        Digestive    GERD (gastroesophageal reflux disease)       Endocrine    IGT (impaired glucose tolerance)       Nervous and Auditory    PMR (polymyalgia rheumatica) (CMS/[facility]) - Primary       Musculoskeletal and Integument    Osteoporosis       Other    RLS (restless legs syndrome)      Diagnoses       Codes Comments    PMR (polymyalgia rheumatica) (CMS/[facility])    -  Primary ICD-10-CM: M35.3  ICD-9-CM: 725     IGT (impaired glucose tolerance)     ICD-10-CM: R73.02  ICD-9-CM: 790.22     Gastroesophageal reflux disease, unspecified whether esophagitis present     ICD-10-CM: K21.9  ICD-9-CM: 530.81     Osteoporosis, unspecified osteoporosis type, unspecified pathological fracture presence     ICD-10-CM: M81.0  ICD-9-CM: 733.00     RLS (restless legs syndrome)     ICD-10-CM: G25.81  ICD-9-CM: 333.94         Assessment/Plan   Video visit today due to the Covid pandemic.  In today for recheck of PMR, impaired glucose tolerance, restless leg syndrome, and osteoporosis.  No further ache in the lower back, hips, knees, thighs.  No jaw claudication.  Some left shoulder pain persists.  He has been off of prednisone for about 6 months now.  Now off of Prolia.  Began 6 months ago with the left shoulder pain.   He failed an injection 5 months ago.  He failed a second steroid injection in August 2020.  On metformin now.  Sed rate, CRP, glucose, A1c every 3 months.  Annual lab work August 2020.  That will include CBC, CMP, A1c, CRP, sed rate.  Will recheck again in 12  weeks.  He started on prednisone on July 9, 2018.  Lightheaded spells have resolved.  Annual wellness visit August 2020.   Will consider orthopedic opinion on left shoulder to see if he has any evidence of bursitis or tendinitis that could be treated differently.  He is disinterested.  Discussed physical therapy and he is doing exercises on his own.  Kristian.  Spent 15 minutes with patient on the visit today.  He will get flu shot when he comes in for his lab work.         Carly disclaimer:   Much of this encounter note is an electronic transcription/translation of spoken language to printed text. The electronic translation of spoken language may permit erroneous, or at times, nonsensical words or phrases to be inadvertently transcribed; Although I have reviewed the note for such errors, some may still exist.

## 2020-11-09 ENCOUNTER — OFFICE VISIT (OUTPATIENT)
Dept: INTERNAL MEDICINE | Facility: CLINIC | Age: 85
End: 2020-11-09

## 2020-11-09 DIAGNOSIS — R73.02 IGT (IMPAIRED GLUCOSE TOLERANCE): Primary | ICD-10-CM

## 2020-11-10 LAB
CRP SERPL-MCNC: 0.29 MG/DL (ref 0–0.5)
ERYTHROCYTE [SEDIMENTATION RATE] IN BLOOD BY WESTERGREN METHOD: 9 MM/HR (ref 0–20)
GLUCOSE SERPL-MCNC: 83 MG/DL (ref 65–99)
HBA1C MFR BLD: 6.1 % (ref 4.8–5.6)

## 2021-01-12 NOTE — PROGRESS NOTES
Carlos A Boyer Jr.  [id number]  1958  62 y.o.  male           Referring Provider: Vinayak Correia PA    Reason for Follow-up Visit:     Prior ER visit for Paroxysmal atrial fibrillation with with rapid ventricular response    CARLOS guided cardioversion, on anticoagulation with Eliquis  coronary artery disease stented coronary artery   Type 2 diabetes mellitus   S/p CABG x 2 Dr Tang 7/2019  S/P  right rotator cuff surgery Dr Divina Michelle [facility]   myocardial perfusion scan as below  Coronary CT angiography report as below    Here for routine follow up today    Subjective       Overall feels the same   No new events or complaints since last visit except Covid in past and recovered   Overall the patient feels no major change from baseline symptoms   Similar symptoms as during last visit     Tolerating current medications well with no untoward side effects   Compliant with prescribed medication regimen. Tries to adhere to cardiac diet.      Chest pain at random and not necessarily related to exertion  Overall feels better and chest pain now improved     No change with intake of food or antacids  No change with breathing  Moderate associated dyspnea      BP well controlled at home.     Blood sugars well controlled at home      History of present illness:  Carlos A Boyer Jr. is a 62 y.o. yo male with history of chest pain who presents today for   Chief Complaint   Patient presents with   • Coronary Artery Disease     3 mo f/u   • Chest Pain     occasional episodes - pt states he has taken Nitro & it relives the pain   .    History  Past Medical History:   Diagnosis Date   • Arthritis    • Asthma    • Cancer (CMS/HCC)    • Carotid disease, bilateral (CMS/HCC)    • Chest pain    • Chronic ischemic heart disease    • Chronic sinusitis    • Maribell bullosa    • Coronary artery disease    • Deviated septum    • Diabetes mellitus (CMS/HCC)    • Difficulty urinating    • Diverticulitis    • Enlarged prostate    • Fatty  Subjective   Jim Romero is a 88 y.o. male.     Chief Complaint   Patient presents with   • Heartburn   • Restless Legs Syndrome   • Sciatica         Heartburn   He complains of coughing. Pertinent negatives include no fatigue.   Sciatica   This is a chronic problem. The current episode started more than 1 year ago. Associated symptoms include coughing and myalgias. Pertinent negatives include no chills or fatigue.   Pain   This is a chronic problem. The current episode started more than 1 month ago. The problem has been unchanged. Associated symptoms include coughing and myalgias. Pertinent negatives include no chills or fatigue.   Arthritis   Presents for follow-up visit. The symptoms have been stable. Affected locations include the right knee, left knee, left DIP and right hip. Pertinent negatives include no diarrhea, dysuria or fatigue.        The following portions of the patient's history were reviewed and updated as appropriate: allergies, current medications, past social history and problem list.    Outpatient Medications Marked as Taking for the 5/22/19 encounter (Office Visit) with Reji Gilman MD   Medication Sig Dispense Refill   • metFORMIN (GLUCOPHAGE) 500 MG tablet Take 1 tablet twice daily 180 tablet 1   • pantoprazole (PROTONIX) 40 MG EC tablet TAKE ONE TABLET BY MOUTH DAILY 90 tablet 0   • pramipexole (MIRAPEX) 0.5 MG tablet TAKE ONE TABLET BY MOUTH TWICE A  tablet 0   • predniSONE (DELTASONE) 1 MG tablet Take 1 tablet by mouth Daily. 30 tablet 3   • predniSONE (DELTASONE) 5 MG tablet Take 1 tablet by mouth Daily. 30 tablet 3   • tamsulosin (FLOMAX) 0.4 MG capsule 24 hr capsule TAKE ONE CAPSULE BY MOUTH TWICE A  capsule 0       Review of Systems   Constitutional: Negative for chills and fatigue.   HENT: Positive for postnasal drip and rhinorrhea.    Respiratory: Positive for cough. Negative for shortness of breath.    Gastrointestinal: Negative for diarrhea.   Genitourinary:  Negative for dysuria.   Musculoskeletal: Positive for arthritis, back pain and myalgias.       Objective   Vitals:    05/22/19 0954   BP: 118/64   Pulse: 77   Resp: 16   Temp: 98.8 °F (37.1 °C)   SpO2: 95%          05/22/19  0954   Weight: 82.9 kg (182 lb 12.8 oz)    [unfilled]  Body mass index is 24.79 kg/m².      Physical Exam   Constitutional: He appears well-developed and well-nourished.   HENT:   Head: Normocephalic and atraumatic.   Right Ear: External ear normal.   Left Ear: External ear normal.   Mouth/Throat: Oropharynx is clear and moist.   Pulmonary/Chest: Effort normal and breath sounds normal. No stridor. No respiratory distress. He has no wheezes.   Musculoskeletal: Normal range of motion. He exhibits no edema, tenderness or deformity.   Skin: Skin is warm and dry.         Problem List Items Addressed This Visit        Digestive    GERD (gastroesophageal reflux disease)       Endocrine    IGT (impaired glucose tolerance)       Nervous and Auditory    PMR (polymyalgia rheumatica) (CMS/[facility]) - Primary       Musculoskeletal and Integument    Osteoporosis        Assessment/Plan   In today for recheck of PMR.  Ache the lower back, hips, knees, thighs.  He states he was stiff and achy but that has cleared up.  No jaw claudication.  He had been on prednisone 7.5 mg daily for the prior 10 weeks.  CRP was elevated.  Sedimentation rate was only minimally elevated.  Has started on Prolia.  Added caltrate D.  On metformin now.  Sed rate, CRP, glucose, A1c today.  Currently is asymptomatic.  On 6 mg daily of prednisone now.  He had no problems at all transitioning from 7.5 to 6 mg daily.  Will cut down to 5 mg now.  He started on prednisone on July 9, 2018.  Recheck in 2 months.  Annual lab work August 2018.  He has had a cold for 4 weeks now.  Cough and chest congestion.  Postnasal drip and runny nose.  Head congestion.  Wants another Z-Martin but is only thing that works for him.  This will be okay.  Annual  liver    • GERD (gastroesophageal reflux disease)    • Heart disease    • Hyperlipidemia    • Hypertension    • Hypertrophy of nasal turbinates    • Keratoderma    • Kidney stone    • Migraine    • Murmur, heart    • Myocardial infarction (CMS/HCC)    • Obesity    • PONV (postoperative nausea and vomiting)    • Psoriasis    • Seizures (CMS/HCC)    • Sinus congestion    • Skin cancer    • Sleep apnea    • SOB (shortness of breath)    • Stroke (CMS/HCC)    • UTI (urinary tract infection)    ,   Past Surgical History:   Procedure Laterality Date   • CARDIAC CATHETERIZATION  01/2016    Dr. Broadbent; widely patent previously placed stents in the left anterior descending and obstructive disease involving the diagonal branch which was treated medically   • CARDIAC CATHETERIZATION N/A 7/14/2017    Procedure: Left Heart Cath;  Surgeon: Wade Ramey MD;  Location:  [facility] CATH INVASIVE LOCATION;  Service:    • CARDIAC CATHETERIZATION Left 10/15/2018    Procedure: Cardiac Catheterization/Vascular Study;  Surgeon: Wade Ramey MD;  Location:  [facility] CATH INVASIVE LOC[facility];  Service: Cardiology   • CARDIAC CATHETERIZATION  10/15/2018    Procedure: Functional Flow [place];  Surgeon: Wade Ramey MD;  Location:  [facility] CATH INVASIVE [facility];  Service: Cardiology   • CARDIAC CATHETERIZATION N/A 10/15/2018    Procedure: Left ventriculography;  Surgeon: Wade Ramey MD;  Location:  [facility] CATH INVASIVE [facility];  Service: Cardiology   • CARDIAC CATHETERIZATION Left 6/26/2019    Procedure: Cardiac Catheterization/Vascular Study VEL OK  HE WILL WAIT 1 YEAR FOR SHOULDER SURGERY ;  Surgeon: Wade Ramey MD;  Location: [facility] CATH INVASIVE LOCATION;  Service: Cardiology   • CHOLECYSTECTOMY     • CHOLECYSTECTOMY WITH INTRAOPERATIVE CHOLANGIOGRAM N/A 8/1/2018    Procedure: CHOLECYSTECTOMY LAPAROSCOPIC INTRAOPERATIVE CHOLANGIOGRAM;  Surgeon: Shane Ann MD;  Location: [facility] OR;  Service: General   • COLONOSCOPY N/A 7/14/2020     Procedure: COLONOSCOPY WITH ANESTHESIA;  Surgeon: Anupam Morales DO;  Location: [facility] ENDOSCOPY;  Service: Gastroenterology;  Laterality: N/A;  pre: abdominal pain  post: diverticulosis  Vinayak Correia PA   • CORONARY ANGIOPLASTY     • CORONARY ARTERY BYPASS GRAFT N/A 7/6/2019    Procedure: CABG X2 WITH LIMA, LEFT LEG OVH, AND PLACEMENT OF LEFT FEMORAL ARTERIAL LINE;  Surgeon: Steven Tang MD;  Location: [facility] OR;  Service: Cardiothoracic   • CORONARY STENT PLACEMENT      x 6   • ENDOSCOPIC FUNCTIONAL SINUS SURGERY (FESS) Bilateral 12/13/2017    Procedure: PROCEDURE PERFORMED:  Bilateral functional endoscopic anterior ethmoidectomy with bilateral middle meatal antrostomy Septoplasty Right kathia bullosa resection Bilateral inferior turbinate reduction via Coblation;  Surgeon: Mayank Ibarra MD;  Location: [facility] OR;  Service:    • ENDOSCOPY N/A 7/30/2018    Procedure: ESOPHAGOGASTRODUODENOSCOPY WITH ANESTHESIA;  Surgeon: Benitez Mas MD;  Location: [facility] ENDOSCOPY;  Service: Gastroenterology   • ENDOSCOPY N/A 7/14/2020    Procedure: ESOPHAGOGASTRODUODENOSCOPY WITH ANESTHESIA;  Surgeon: Anupam Morales DO;  Location: [facility] ENDOSCOPY;  Service: Gastroenterology;  Laterality: N/A;  pre: abdominal pain  post: esophagitis  Vinayak Correia PA   • HERNIA REPAIR      x2 inguinal area   • KIDNEY STONE SURGERY     • KNEE SURGERY Right    • OTHER SURGICAL HISTORY      urolift   • PROSTATE SURGERY      Dr. Badillo - 2017   • ROTATOR CUFF REPAIR     • THUMB AMPUTATION Left     partial   • TOE AMPUTATION Right     big   ,   Family History   Problem Relation Age of Onset   • Heart disease Father    • COPD Mother    • Hypertension Mother    • Asthma Mother    • No Known Problems Sister    • Colon cancer Paternal Uncle    • Prostate cancer Maternal Grandfather    • No Known Problems Sister    • Colon cancer Maternal Grandmother    • Colon polyps Maternal Grandmother    ,   Social History  wellness visit today.           .[email or web address]  Dragon disclaimer:   Much of this encounter note is an electronic transcription/translation of spoken language to printed text. The electronic translation of spoken language may permit erroneous, or at times, nonsensical words or phrases to be inadvertently transcribed; Although I have reviewed the note for such errors, some may still exist.        Tobacco Use   • Smoking status: Former Smoker     Packs/day: 1.50     Years: 4.50     Pack years: 6.75     Types: Cigarettes, Cigars     Quit date:      Years since quittin.0   • Smokeless tobacco: Former User     Types: Chew     Quit date:    Substance Use Topics   • Alcohol use: No   • Drug use: No   ,     Medications  Current Outpatient Medications   Medication Sig Dispense Refill   • albuterol (PROVENTIL HFA;VENTOLIN HFA) 108 (90 BASE) MCG/ACT inhaler Inhale 2 puffs Every 6 (Six) Hours As Needed for wheezing.     • apixaban (ELIQUIS) 5 MG tablet tablet Take 1 tablet by mouth Every 12 (Twelve) Hours. 180 tablet 3   • aspirin 81 MG chewable tablet Chew 81 mg Daily.     • calcium polycarbophil (FIBERCON) 625 MG tablet Take 625 mg by mouth Daily.     • carboxymethylcellulose (REFRESH PLUS) 0.5 % solution Administer 1 drop to both eyes 4 (Four) Times a Day As Needed for Dry Eyes.     • Empagliflozin (JARDIANCE) 10 MG tablet Take 2 tablets by mouth Daily. 20 mg total     • fluticasone (FLONASE) 50 MCG/ACT nasal spray 2 sprays into each nostril Daily.     • furosemide (LASIX) 40 MG tablet TAKE 1 TABLET BY MOUTH ONCE DAILY AS NEEDED FOR WEIGHT GAIN GREATER THAN 2 LBS IN A DAY OR INCREASING CHEST CONGESTION  0   • gabapentin (Neurontin) 300 MG capsule Take 1 capsule by mouth Every Night. 90 capsule 0   • insulin aspart (novoLOG FLEXPEN) 100 UNIT/ML solution pen-injector sc pen Inject 30 Units under the skin into the appropriate area as directed Every Morning. 34 units  at breakfast, 30 units at lunch, 38 units hs     • Insulin Glargine (LANTUS SOLOSTAR) 100 UNIT/ML injection pen Inject 60-90 Units under the skin into the appropriate area as directed 2 (Two) Times a Day. 70 units QAM AND 80 units QPM     • isosorbide mononitrate (IMDUR) 30 MG 24 hr tablet Take 1 tablet by mouth Daily. 30 tablet 0   • lamoTRIgine (LaMICtal) 200 MG tablet Take 1 tablet by mouth 2 (Two) Times a Day. (Patient taking  differently: Take 100 mg by mouth 2 (Two) Times a Day.) 180 tablet 1   • levETIRAcetam (KEPPRA) 500 MG tablet Take 3 tablets every morning, take 4 tablets every night. (Patient taking differently: Take 1,500 mg by mouth Daily With Breakfast. Patient takes 1500 MG in the morning and 2000 MG at night) 630 tablet 1   • Liraglutide (VICTOZA SC) Inject 1.2 mg under the skin into the appropriate area as directed Every Night. 1.2     • lisinopril (PRINIVIL,ZESTRIL) 20 MG tablet Take 1 tablet by mouth Daily. 90 tablet 3   • metFORMIN (GLUCOPHAGE) 1000 MG tablet Take 1 tablet by mouth 2 (Two) Times a Day With Meals. HOLD x 48 hours post contrast. May resume 3/18/18     • Multiple Vitamin (MULTI VITAMIN PO) Take 1 tablet by mouth Daily.     • nitroglycerin (NITROSTAT) 0.4 MG SL tablet Place 0.4 mg under the tongue Every 5 (Five) Minutes As Needed for chest pain. Take no more than 3 doses in 15 minutes.     • ondansetron ODT (ZOFRAN-ODT) 4 MG disintegrating tablet Take 1 tablet by mouth Every 4 (Four) Hours As Needed for Nausea or Vomiting. 10 tablet 0   • pantoprazole (PROTONIX) 40 MG EC tablet Take 40 mg by mouth 2 (Two) Times a Day.     • psyllium (METAMUCIL) 58.6 % packet Take 1 packet by mouth Daily As Needed (constipation).       No current facility-administered medications for this visit.        Allergies:  Flagyl [metronidazole], Atorvastatin, and Ciprofloxacin    Review of Systems  Review of Systems   Constitution: Positive for malaise/fatigue.   HENT: Negative.    Eyes: Negative.    Cardiovascular: Positive for dyspnea on exertion. Negative for chest pain, claudication, cyanosis, irregular heartbeat, leg swelling, near-syncope, orthopnea, palpitations, paroxysmal nocturnal dyspnea and syncope.   Respiratory: Negative.    Endocrine: Negative.    Hematologic/Lymphatic: Negative.    Skin: Negative.    Musculoskeletal: Positive for arthritis and back pain.   Gastrointestinal: Negative for anorexia.   Genitourinary:  "Negative.    Neurological: Positive for dizziness, light-headedness and weakness.   Psychiatric/Behavioral: Negative.        Objective     Physical Exam:      Vitals:    01/12/21 0932   BP: 142/90   Pulse: 84   SpO2: 99%   Weight: 115 kg (253 lb)   Height: 182.9 cm (72\")         Physical Exam   Constitutional: He appears well-developed.   HENT:   Head: Normocephalic.   Neck: Normal carotid pulses and no JVD present. No tracheal tenderness present. Carotid bruit is not present. No tracheal deviation and no edema present.   Cardiovascular: Regular rhythm, normal heart sounds and normal pulses.   Pulmonary/Chest: Effort normal. No stridor.   Abdominal: Soft. He exhibits no distension. There is no abdominal tenderness.   Neurological: He is alert. No cranial nerve deficit or sensory deficit.   Skin: Skin is warm.   Psychiatric: His speech is normal and behavior is normal.       Results Review:    Cardiac CT angiography 9/16/2020     Impression:      1. Total calcium score : 3345.6  2.  Patent left internal mammary artery graft.  Severe stenosis of the mid left anterior descending coronary artery where there are earlier implanted stents  3.  Suspected more than 50% stenosis of the left anterior descending coronary artery after touchdown site of the left internal mammary artery graft  4.  Severe stenosis of proximal first obtuse marginal branch  5.   Patent saphenous venous graft to the first obtuse marginal branch with suspected more than 50% stenosis at the touchdown site.  Significant coronary calcification preventing good visualization of the anastomotic site.  6.   50 to 60% stenosis of the right posterior descending coronary artery        ___________________________________________________________________________     Recommendations:     Ongoing risk factor modifications  Further evaluation if ongoing chest pain or high risk of suspicion of significant ischemic heart disease          Carlos A Boyer Jr. "   Regadenoson Stress Test With Myocardial Perfusion SPECT (Multi Study)   Order# [id number]   Reading physician: Wade Ramey MD Ordering physician: Wade Ramey MD Study date: 20   Patient Information     Patient Name  Carlos A Boyer Jr. MRN  [id number] Sex  Male  (Age)  1958 (62 y.o.)   Interpretation Summary     · Left ventricular ejection fraction is normal (Calculated EF = 54%).  · Myocardial perfusion imaging indicates a medium-sized infarct located in the anterior wall and apex with no significant ischemia noted.  · Diaphragmatic attenuation and GI artifacts are present.  · Raw images reviewed with the following abnormalities noted: vertical motion.           Conclusion of cardiac catheterization     Left main coronary arteries normal  Left anterior descending coronary artery has patent stents  The distal edge of the stent in the mid left anterior descending coronary artery has approximately 60% stenosis  Highly abnormal fractional flow reserve of this at 0.78 without adenosine stress.  PTCA done of the segment.  Despite multiple attempts and use of guide liner could not advance the stent due to earlier implanted stents.  Left circumflex coronary artery is codominant and large  The proximal portion of the first obtuse marginal branch has a 60% stenosis with normal fractional flow reserve above 0.85.  Right coronary artery is large and codominant without any high-grade lesions.     Left ventricular end-diastolic pressure is mildly elevated to 15 mmHg.  No gradient across aortic valve on pullback.  Left ventriculography shows a hyperdynamic left ventricle with ejection fraction of 75%.     Percutaneous coronary intervention     XB 3.5 guide advised used for fractional flow reserve of the obtuse marginal branch as well as of the left anterior descending coronary artery  Then we did try to advance a 2.5 mm stent.  We used 2 different size stents and could not cross  We used a guide liner and does  not did not have placed cross across the stent.  We then did balloon angioplasty using 2.0 mm balloon.  Stenosis was reduced from 60% down to less than 10%.  BEVERLEY-3 flow before and after procedure.           ____________________________________________________________________________________________________________________________________________     Plan after cardiac catheterization        Dual antiplatelet therapy minimum of 1 year preferably longer  Statin ACE inhibitors beta-blockers  If there is repeat restenosis of the left anterior descending coronary artery would consider surgical revascularization.  Target LDL less than 70 mg/dL.  Maximize antianginal therapy.  Intensive risk factor modifications for both primary and secondary prevention if applicable  Hydration   Observation     Results for orders placed during the hospital encounter of 09/05/19   Adult Transesophageal Echo (CARLOS) W/ Cont if Necessary Per Protocol    Narrative · No evidence of left atrial appendage thrombus - okay to proceed with   cardioversion.  · Left ventricular systolic function is normal.  · Moderate mitral valve regurgitation is present  · Mild aortic insufficiency.  · Normal size and function of the right ventricle.  · Negative bubble study.      10/15/18        Conclusion     The left main coronary artery is normal  Left anterior descending coronary artery and diagonal branches are patent with a widely patent stent noted in the  left anterior descending coronary artery    Mid left anterior descending coronary artery is a 50% stenosis and hemodynamically not significant.  Left circumflex coronary artery is large with a first obtuse marginal around 40-60% stenosis.  Fractional flow reserve of this is completely normal and at above 0.9.  Left circumflex coronary artery is a codominant.  Right coronary artery is codominant large with distal small vessel disease.     Left ventricular end-diastolic pressure is normal at 11 mmHg no  gradient across aortic valve on pullback  Left ventriculography shows ejection fraction of 55%.           Plan     Continue current medication  Symptomatic treatment for small vessel disease with antianginals  If stable can be discharged home later today  Intensive risk factor modifications for both primary and secondary prevention if applicable  Hydration   Observation         7/17 cath  Conclusion  Nonocclusive epicardial coronary arteries with widely patent stents in the  left anterior descending coronary artery   artery and left circumflex coronary artery.  Hyperdynamic left ventricle with ejection fraction of 75%.  LVEDP   17    mm Hg     Plan  Workup for noncardiac causes of chest pain this can be done as outpatient.  His d-dimer is within normal range  After a period of observation of stable can be discharged either later today.  Keep follow-up appointment with me  Ongoing risk factor modifications keep LDL below 70 mg/dL  Hydration   Observation       ECG 12 Lead    Date/Time: 1/12/2021 10:28 AM  Performed by: Wade Ramey MD  Authorized by: Wade Ramey MD   Comparison: compared with previous ECG from 10/21/2020  Rhythm: sinus rhythm  Rate: normal  T inversion: I, aVL, V4, V5 and V6  QRS axis: normal    Clinical impression: abnormal EKG            Assessment/Plan   Patient Active Problem List   Diagnosis   • Type 2 diabetes mellitus without complication, with long-term current use of insulin (CMS/[facility])   • Gastroesophageal reflux disease without esophagitis   • Essential hypertension   • Seizure disorder (CMS/[facility])   • Carotid disease, bilateral (CMS/[facility])   • Coronary artery disease involving native coronary artery of native heart with angina pectoris (CMS/HCC)   • Mixed hyperlipidemia   • Chronic left arterial ischemic stroke, ICA (internal carotid artery)   • HOA on CPAP   • Benign non-nodular prostatic hyperplasia with lower urinary tract symptoms   • Deviated nasal septum   • Maribell bullosa   •  Hypertrophy of both inferior nasal turbinates   • Chronic sinusitis of both maxillary sinuses   • S/P FESS (functional endoscopic sinus surgery)   • Diabetic complication (CMS/HCC)   • Epigastric pain   • Diabetic peripheral neuropathy (CMS/HCC)   • Abnormal stress test   • Stenosis of left carotid artery   • Class 1 obesity in adult   • Aspirin-like platelet function defect (CMS/HCC)   • History of seizure   • Lung nodules   • Cardiac murmur   • Thoracic aortic aneurysm without rupture (CMS/HCC)   • Paroxysmal atrial fibrillation (CMS/HCC)   • Atrial flutter (CMS/HCC) with cardioversion on Sept 5, 2019   • Acute diastolic congestive heart failure (CMS/HCC)   • Fluid overload   • Pleural effusion   • History of hyperlipidemia   • Respiratory distress   • Long-term use of high-risk medication   • Dysphagia   • Left lower quadrant abdominal pain   • Chest pain   • COVID-19 virus detected   • Shortness of breath       ____________________________________________________________________________________________________________________________________________  Health maintenance and recommendations      Offered to give patient  a copy      Questions were encouraged, asked and answered to the patient's  understanding and satisfaction. Questions if any regarding current medications and side effects, need for refills and importance of compliance to medications stressed.    Reviewed available prior notes, consults, prior visits, laboratory findings, radiology and cardiology relevant reports. Updated chart as applicable. I have reviewed the patient's medical history in detail and updated the computerized patient record as relevant.      Updated patient regarding any new or relevant abnormalities on review of records or any new findings on physical exam. Mentioned to patient about purpose of visit and desirable health short and long term goals and objectives.    Primary to monitor CBC CMP Lipid panel and TSH as  applicable    ___________________________________________________________________________________________________________________________________________      Plan      The current medical regimen is effective;  continue present plan and medications.   Keep A1c less than 7 Primary to monitor  Keep LDL below 70 mg/dl. Monitor liver and renal functions.   Monitor CBC, CMP, TSH (as indicated) and Lipid Panel by primary      S/L NTG PRN for chest pain, call me or go to ER if has to use S/L nitroglycerin     Overall doing well no new cardiovascular symptoms and therefore no additional cardiac testing is required   If any interim issues arise will call me for further evaluation.     I support the patient's decision to take the Covid -19 vaccine    Monitor for any signs of bleeding including red or dark stools as well as easy bruisabilty. Fall precautions.      Check BP and heart rates twice daily at least 3x / week, week a month  at home and bring a recording for me to review next visit  If BP >130/85 or < 100/60 persistently over 3 reading 30 mins apart call sooner            Return in about 6 months (around 7/12/2021).

## 2021-01-21 RX ORDER — PRAMIPEXOLE DIHYDROCHLORIDE 1.5 MG/1
TABLET ORAL
Qty: 90 TABLET | Refills: 0 | Status: SHIPPED | OUTPATIENT
Start: 2021-01-21 | End: 2021-05-03

## 2021-02-08 ENCOUNTER — TELEMEDICINE (OUTPATIENT)
Dept: INTERNAL MEDICINE | Facility: CLINIC | Age: 86
End: 2021-02-08

## 2021-02-08 DIAGNOSIS — R73.02 IGT (IMPAIRED GLUCOSE TOLERANCE): ICD-10-CM

## 2021-02-08 DIAGNOSIS — K21.9 GASTROESOPHAGEAL REFLUX DISEASE, UNSPECIFIED WHETHER ESOPHAGITIS PRESENT: ICD-10-CM

## 2021-02-08 DIAGNOSIS — G25.81 RLS (RESTLESS LEGS SYNDROME): ICD-10-CM

## 2021-02-08 DIAGNOSIS — M35.3 PMR (POLYMYALGIA RHEUMATICA) (HCC): Primary | ICD-10-CM

## 2021-02-08 PROCEDURE — 99214 OFFICE O/P EST MOD 30 MIN: CPT | Performed by: INTERNAL MEDICINE

## 2021-02-08 RX ORDER — CLOTRIMAZOLE 10 MG/1
LOZENGE ORAL; TOPICAL
Refills: 0 | OUTPATIENT
Start: 2021-02-08

## 2021-02-08 NOTE — PROGRESS NOTES
Subjective   Jim Romero is a 89 y.o. male.     Chief Complaint   Patient presents with   • Restless Legs Syndrome   • Hyperglycemia   • PMR    • Mucus buildup in mouth     Brown in color          Hyperglycemia  This is a chronic problem. The current episode started more than 1 year ago. Associated symptoms include arthralgias ( left shoulder pain) and myalgias. Pertinent negatives include no chest pain or coughing.   Pain  This is a chronic problem. The current episode started more than 1 month ago. The problem has been unchanged. Associated symptoms include arthralgias ( left shoulder pain) and myalgias. Pertinent negatives include no chest pain or coughing.   Arthritis  Presents for follow-up visit. The symptoms have been stable. Affected locations include the right knee, left knee, left DIP and right hip. Pertinent negatives include no diarrhea.        The following portions of the patient's history were reviewed and updated as appropriate: allergies, current medications, past social history and problem list.    Outpatient Medications Marked as Taking for the 2/8/21 encounter (Telemedicine) with Reji Gilman MD   Medication Sig Dispense Refill   • Aspirin Buf,CaCarb-MgCarb-MgO, 81 MG tablet Take 81 mg by mouth Daily.     • calcium carbonate-cholecalciferol 500-400 MG-UNIT tablet tablet Take 1 tablet by mouth Daily.     • pantoprazole (PROTONIX) 40 MG EC tablet TAKE ONE TABLET BY MOUTH DAILY 90 tablet 3   • pramipexole (MIRAPEX) 1.5 MG tablet TAKE ONE TABLET BY MOUTH ONCE NIGHTLY 90 tablet 0   • tamsulosin (FLOMAX) 0.4 MG capsule 24 hr capsule TAKE ONE CAPSULE BY MOUTH TWICE A  capsule 1       Review of Systems   Respiratory: Negative for cough, shortness of breath and wheezing.    Cardiovascular: Negative for chest pain, palpitations and leg swelling.   Gastrointestinal: Negative for constipation and diarrhea.   Musculoskeletal: Positive for arthralgias ( left shoulder pain), arthritis, back pain and  myalgias.       Objective   There were no vitals filed for this visit.   There were no vitals filed for this visit. [unfilled]  There is no height or weight on file to calculate BMI.      Physical Exam   Pulmonary/Chest: Effort normal.   Abdominal: Normal appearance.   Neurological: He is alert.   Psychiatric: His behavior is normal. Mood, judgment and thought content normal.         Problems Addressed this Visit        Endocrine and Metabolic    IGT (impaired glucose tolerance)       Gastrointestinal Abdominal     GERD (gastroesophageal reflux disease)       Multi-system (Lupus, Sarcoid...)    PMR (polymyalgia rheumatica) (CMS/[facility]) - Primary       Neuro    RLS (restless legs syndrome)      Diagnoses       Codes Comments    PMR (polymyalgia rheumatica) (CMS/[facility])    -  Primary ICD-10-CM: M35.3  ICD-9-CM: 725     IGT (impaired glucose tolerance)     ICD-10-CM: R73.02  ICD-9-CM: 790.22     Gastroesophageal reflux disease, unspecified whether esophagitis present     ICD-10-CM: K21.9  ICD-9-CM: 530.81     RLS (restless legs syndrome)     ICD-10-CM: G25.81  ICD-9-CM: 333.94         Assessment/Plan   Video visit today due to the Covid pandemic.  In today for recheck of PMR, impaired glucose tolerance, restless leg syndrome, and osteoporosis.  No further ache in the lower back, hips, knees, thighs.  No jaw claudication.  Left shoulder pain has resolved.  He has been off of prednisone for about 9 months now.  Now off of Prolia.  Off metformin now.  Sed rate, CRP, glucose, A1c every 3 months.  Annual lab work August 2020 included CBC, CMP, A1c, CRP, sed rate.  Will recheck again in 12 weeks.  He started on prednisone on July 9, 2018.  Annual wellness visit August 2020.   He has been having some brown gunk in his lips and mouth at least once per day and is not sure where it is coming from.  He said nothing coming up from his GERD or from coughing.  It is possible is dropping down from his sinuses.  Irregardless we are not  going to treat it.  Already has problems with dry mouth.  Reviewed medications today.  No changes made.  Spent 15 minutes with patient on the visit today.           Dragon disclaimer:   Much of this encounter note is an electronic transcription/translation of spoken language to printed text. The electronic translation of spoken language may permit erroneous, or at times, nonsensical words or phrases to be inadvertently transcribed; Although I have reviewed the note for such errors, some may still exist.

## 2021-02-16 ENCOUNTER — OFFICE VISIT (OUTPATIENT)
Dept: INTERNAL MEDICINE | Facility: CLINIC | Age: 86
End: 2021-02-16

## 2021-02-16 DIAGNOSIS — Z01.89 LABORATORY PROCEDURE: Primary | ICD-10-CM

## 2021-02-16 LAB
CRP SERPL-MCNC: <0.3 MG/DL (ref 0–0.5)
ERYTHROCYTE [SEDIMENTATION RATE] IN BLOOD BY WESTERGREN METHOD: 4 MM/HR (ref 0–20)
GLUCOSE SERPL-MCNC: 149 MG/DL (ref 65–99)
HBA1C MFR BLD: 6 % (ref 4.8–5.6)

## 2021-03-09 DIAGNOSIS — Z23 IMMUNIZATION DUE: ICD-10-CM

## 2021-04-08 ENCOUNTER — TELEPHONE (OUTPATIENT)
Dept: INTERNAL MEDICINE | Facility: CLINIC | Age: 86
End: 2021-04-08

## 2021-04-08 NOTE — TELEPHONE ENCOUNTER
Caller: Jim Romero    Relationship: Self    Best call back number: [phone]    What medication are you requesting: ZPACK    What are your current symptoms: COUGH, PHLEGM, RUNNY NOSE    How long have you been experiencing symptoms: WEEK AND A HALF    Have you had these symptoms before:    [] Yes  [] No    Have you been treated for these symptoms before:   [] Yes  [] No    If a prescription is needed, what is your preferred pharmacy and phone number: [facility] [address] AT [facility] [phone] [facility] [phone] FX

## 2021-04-09 ENCOUNTER — OFFICE VISIT (OUTPATIENT)
Dept: INTERNAL MEDICINE | Facility: CLINIC | Age: 86
End: 2021-04-09

## 2021-04-09 DIAGNOSIS — J40 BRONCHITIS: Primary | ICD-10-CM

## 2021-04-09 PROCEDURE — 99213 OFFICE O/P EST LOW 20 MIN: CPT | Performed by: INTERNAL MEDICINE

## 2021-04-09 RX ORDER — AMOXICILLIN AND CLAVULANATE POTASSIUM 875; 125 MG/1; MG/1
1 TABLET, FILM COATED ORAL EVERY 12 HOURS SCHEDULED
Qty: 14 TABLET | Refills: 0 | Status: SHIPPED | OUTPATIENT
Start: 2021-04-09 | End: 2021-06-07

## 2021-04-09 RX ORDER — BENZONATATE 200 MG/1
200 CAPSULE ORAL 3 TIMES DAILY PRN
Qty: 30 CAPSULE | Refills: 0 | Status: SHIPPED | OUTPATIENT
Start: 2021-04-09 | End: 2021-06-07

## 2021-04-09 RX ORDER — AZITHROMYCIN 250 MG/1
TABLET, FILM COATED ORAL
Qty: 6 TABLET | Refills: 0 | Status: SHIPPED | OUTPATIENT
Start: 2021-04-09 | End: 2021-06-07

## 2021-04-09 NOTE — TELEPHONE ENCOUNTER
Pt informed. Pt states he already took one tab of Augmentin, so he will stop by for zpak & discontinue Augmentin.

## 2021-04-09 NOTE — TELEPHONE ENCOUNTER
Caller: Jim Romero    Relationship: Self    Best call back number: [phone]    What medication are you requesting: Z-PACK    What are your current symptoms: RUNNY NOSE/GREEN PHLEGM     If a prescription is needed, what is your preferred pharmacy and phone number: [facility] [address] AT [facility] & ([facility] [phone] [facility] [phone] FX     Additional notes: PATIENT THOUGHT THAT HE WAS GOING TO BE PRESCRIBED A Z-PACK?  THIS IS THE MEDICATION THAT HE WOULD LIKE      ”

## 2021-04-09 NOTE — PROGRESS NOTES
Subjective   Jim Romero is a 89 y.o. male.     Chief Complaint   Patient presents with   • Cough   • Nasal Congestion     x 2 weeks          Cough  This is a new problem. The current episode started 1 to 4 weeks ago. The problem has been gradually worsening. The cough is productive of sputum. Associated symptoms include nasal congestion, rhinorrhea and a sore throat. Pertinent negatives include no chills, fever, headaches or shortness of breath. He has tried OTC cough suppressant for the symptoms.        The following portions of the patient's history were reviewed and updated as appropriate: allergies, current medications, past social history and problem list.    Outpatient Medications Marked as Taking for the 4/9/21 encounter (Office Visit) with Reji Gilman MD   Medication Sig Dispense Refill   • Aspirin Buf,CaCarb-MgCarb-MgO, 81 MG tablet Take 81 mg by mouth Daily.     • calcium carbonate-cholecalciferol 500-400 MG-UNIT tablet tablet Take 1 tablet by mouth Daily.     • pantoprazole (PROTONIX) 40 MG EC tablet TAKE ONE TABLET BY MOUTH DAILY 90 tablet 3   • pramipexole (MIRAPEX) 1.5 MG tablet TAKE ONE TABLET BY MOUTH ONCE NIGHTLY 90 tablet 0   • tamsulosin (FLOMAX) 0.4 MG capsule 24 hr capsule TAKE ONE CAPSULE BY MOUTH TWICE A  capsule 1       Review of Systems   Constitutional: Negative for chills and fever.   HENT: Positive for rhinorrhea and sore throat.    Respiratory: Positive for cough. Negative for shortness of breath.    Neurological: Negative for headaches.       Objective   There were no vitals filed for this visit.   Wt Readings from Last 3 Encounters:   08/04/20 80.6 kg (177 lb 9.6 oz)   03/02/20 83 kg (183 lb)   01/09/20 79.4 kg (175 lb)    There is no height or weight on file to calculate BMI.      Physical Exam  Constitutional:       Appearance: Normal appearance.   Pulmonary:      Effort: Pulmonary effort is normal.   Neurological:      Mental Status: He is alert.   Psychiatric:          Mood and Affect: Mood normal.         Behavior: Behavior normal.         Thought Content: Thought content normal.         Judgment: Judgment normal.           Problems Addressed this Visit     None      Visit Diagnoses     Bronchitis    -  Primary      Diagnoses       Codes Comments    Bronchitis    -  Primary ICD-10-CM: J40  ICD-9-CM: 490         Assessment/Plan   Video visit today due to Covid pandemic.  In with 2-week illness.  Runny nose and head congestion and sore throat.  Sore throat resolved early on.  He has had a lingering cough.  Is not getting any better.   If anything he is worse.  Some yellow sputum production.  He has been OTC Mucinex and cough tablets. In the past he is responded nicely to Zithromax.  There is been no systemic symptoms of fever or chills.  We will try another Z-Martin.  Also Tessalon cough Perles 200 mg 3 times daily as needed.  To take a daily dose of Zyrtec.  Spent 11 minutes with patient on the visit today.    The above information was reviewed again today 04/09/21.  It continues to be accurate as reflected above and is unchanged.  History, physical and review of systems all reviewed and are unchanged.  Medications were reviewed today and continue the current dosing.             Carly disclaimer:   Much of this encounter note is an electronic transcription/translation of spoken language to printed text. The electronic translation of spoken language may permit erroneous, or at times, nonsensical words or phrases to be inadvertently transcribed; Although I have reviewed the note for such errors, some may still exist.

## 2021-04-12 RX ORDER — TAMSULOSIN HYDROCHLORIDE 0.4 MG/1
CAPSULE ORAL
Qty: 180 CAPSULE | Refills: 1 | Status: SHIPPED | OUTPATIENT
Start: 2021-04-12 | End: 2021-10-18

## 2021-05-03 RX ORDER — PANTOPRAZOLE SODIUM 40 MG/1
TABLET, DELAYED RELEASE ORAL
Qty: 90 TABLET | Refills: 1 | Status: SHIPPED | OUTPATIENT
Start: 2021-05-03 | End: 2021-05-24

## 2021-05-03 RX ORDER — PRAMIPEXOLE DIHYDROCHLORIDE 1.5 MG/1
TABLET ORAL
Qty: 90 TABLET | Refills: 1 | Status: SHIPPED | OUTPATIENT
Start: 2021-05-03 | End: 2021-09-22

## 2021-05-24 RX ORDER — PANTOPRAZOLE SODIUM 40 MG/1
TABLET, DELAYED RELEASE ORAL
Qty: 90 TABLET | Refills: 2 | Status: SHIPPED | OUTPATIENT
Start: 2021-05-24 | End: 2022-07-11

## 2021-06-07 ENCOUNTER — TELEMEDICINE (OUTPATIENT)
Dept: INTERNAL MEDICINE | Facility: CLINIC | Age: 86
End: 2021-06-07

## 2021-06-07 DIAGNOSIS — M35.3 PMR (POLYMYALGIA RHEUMATICA) (HCC): Primary | ICD-10-CM

## 2021-06-07 DIAGNOSIS — R73.02 IGT (IMPAIRED GLUCOSE TOLERANCE): ICD-10-CM

## 2021-06-07 DIAGNOSIS — K21.9 GASTROESOPHAGEAL REFLUX DISEASE, UNSPECIFIED WHETHER ESOPHAGITIS PRESENT: ICD-10-CM

## 2021-06-07 PROCEDURE — 99213 OFFICE O/P EST LOW 20 MIN: CPT | Performed by: INTERNAL MEDICINE

## 2021-06-07 NOTE — PROGRESS NOTES
Subjective   Jim Romero is a 89 y.o. male.     Chief Complaint   Patient presents with   • IGT   • Heartburn         Heartburn  He reports no chest pain, no coughing or no wheezing.   Hyperglycemia  This is a chronic problem. The current episode started more than 1 year ago. Associated symptoms include arthralgias ( left shoulder pain) and myalgias. Pertinent negatives include no chest pain or coughing.   Pain  This is a chronic problem. The current episode started more than 1 month ago. The problem has been unchanged. Associated symptoms include arthralgias ( left shoulder pain) and myalgias. Pertinent negatives include no chest pain or coughing.   Arthritis  Presents for follow-up visit. The symptoms have been stable. Affected locations include the right knee, left knee, left DIP and right hip. Pertinent negatives include no diarrhea.        The following portions of the patient's history were reviewed and updated as appropriate: allergies, current medications, past social history and problem list.    Outpatient Medications Marked as Taking for the 6/7/21 encounter (Telemedicine) with Reji Gilman MD   Medication Sig Dispense Refill   • Aspirin Buf,CaCarb-MgCarb-MgO, 81 MG tablet Take 81 mg by mouth Daily.     • calcium carbonate-cholecalciferol 500-400 MG-UNIT tablet tablet Take 1 tablet by mouth Daily.     • pantoprazole (PROTONIX) 40 MG EC tablet TAKE ONE TABLET BY MOUTH DAILY 90 tablet 2   • pramipexole (MIRAPEX) 1.5 MG tablet TAKE ONE TABLET BY MOUTH ONCE NIGHTLY 90 tablet 1   • tamsulosin (FLOMAX) 0.4 MG capsule 24 hr capsule TAKE ONE CAPSULE BY MOUTH TWICE A  capsule 1       Review of Systems   Respiratory: Negative for cough, shortness of breath and wheezing.    Cardiovascular: Negative for chest pain, palpitations and leg swelling.   Gastrointestinal: Negative for constipation and diarrhea.   Musculoskeletal: Positive for arthralgias ( left shoulder pain), arthritis, back pain and myalgias.        Objective   There were no vitals filed for this visit.   There were no vitals filed for this visit. [unfilled]  There is no height or weight on file to calculate BMI.      Physical Exam   Pulmonary/Chest: Effort normal.   Abdominal: Normal appearance.   Neurological: He is alert.   Psychiatric: His behavior is normal. Mood, judgment and thought content normal.         Problems Addressed this Visit        Endocrine and Metabolic    IGT (impaired glucose tolerance)       Gastrointestinal Abdominal     GERD (gastroesophageal reflux disease)       Multi-system (Lupus, Sarcoid...)    PMR (polymyalgia rheumatica) (CMS/[facility]) - Primary      Diagnoses       Codes Comments    PMR (polymyalgia rheumatica) (CMS/[facility])    -  Primary ICD-10-CM: M35.3  ICD-9-CM: 725     IGT (impaired glucose tolerance)     ICD-10-CM: R73.02  ICD-9-CM: 790.22     Gastroesophageal reflux disease, unspecified whether esophagitis present     ICD-10-CM: K21.9  ICD-9-CM: 530.81         Assessment/Plan   Video visit today due to the Covid pandemic.  In today for recheck of PMR, impaired glucose tolerance, restless leg syndrome, and osteoporosis.  No further ache in the lower back, hips, knees, thighs.  Left shoulder pain has resolved.  He has been off of prednisone for about 12 months now.  Now off of Prolia.  Off metformin now.  We will check sed rate, CRP, glucose, A1c every 3 months.  Annual lab work August 2020 included CBC, CMP, A1c, CRP, sed rate.  Will recheck again in 3 months.  He started on prednisone on July 9, 2018.  Annual wellness visit August 2020.  Reviewed medications today.  No changes made.  Spent 15 minutes with patient on the visit today.      The above information was reviewed again today 06/07/21.  It continues to be accurate as reflected above and is unchanged.  History, physical and review of systems all reviewed and are unchanged.  Medications were reviewed today and continue the current dosing.           Dragon  disclaimer:   Much of this encounter note is an electronic transcription/translation of spoken language to printed text. The electronic translation of spoken language may permit erroneous, or at times, nonsensical words or phrases to be inadvertently transcribed; Although I have reviewed the note for such errors, some may still exist.

## 2021-07-12 ENCOUNTER — OFFICE VISIT (OUTPATIENT)
Dept: INTERNAL MEDICINE | Facility: CLINIC | Age: 86
End: 2021-07-12

## 2021-07-12 DIAGNOSIS — Z01.89 LABORATORY PROCEDURE: Primary | ICD-10-CM

## 2021-07-13 LAB
CRP SERPL-MCNC: <0.3 MG/DL (ref 0–0.5)
ERYTHROCYTE [SEDIMENTATION RATE] IN BLOOD BY WESTERGREN METHOD: 8 MM/HR (ref 0–20)
GLUCOSE SERPL-MCNC: 99 MG/DL (ref 65–99)
HBA1C MFR BLD: 6.2 % (ref 4.8–5.6)

## 2021-09-13 ENCOUNTER — OFFICE VISIT (OUTPATIENT)
Dept: INTERNAL MEDICINE | Facility: CLINIC | Age: 86
End: 2021-09-13

## 2021-09-13 VITALS
SYSTOLIC BLOOD PRESSURE: 100 MMHG | BODY MASS INDEX: 24.79 KG/M2 | TEMPERATURE: 98.4 F | HEIGHT: 72 IN | OXYGEN SATURATION: 98 % | WEIGHT: 183 LBS | RESPIRATION RATE: 16 BRPM | HEART RATE: 58 BPM | DIASTOLIC BLOOD PRESSURE: 62 MMHG

## 2021-09-13 DIAGNOSIS — R73.02 IGT (IMPAIRED GLUCOSE TOLERANCE): ICD-10-CM

## 2021-09-13 DIAGNOSIS — K21.9 GASTROESOPHAGEAL REFLUX DISEASE, UNSPECIFIED WHETHER ESOPHAGITIS PRESENT: ICD-10-CM

## 2021-09-13 DIAGNOSIS — M81.0 OSTEOPOROSIS, UNSPECIFIED OSTEOPOROSIS TYPE, UNSPECIFIED PATHOLOGICAL FRACTURE PRESENCE: ICD-10-CM

## 2021-09-13 DIAGNOSIS — M35.3 PMR (POLYMYALGIA RHEUMATICA) (HCC): Primary | ICD-10-CM

## 2021-09-13 DIAGNOSIS — Z79.899 MEDICATION MANAGEMENT: ICD-10-CM

## 2021-09-13 PROCEDURE — G0439 PPPS, SUBSEQ VISIT: HCPCS | Performed by: INTERNAL MEDICINE

## 2021-09-13 PROCEDURE — 99214 OFFICE O/P EST MOD 30 MIN: CPT | Performed by: INTERNAL MEDICINE

## 2021-09-13 NOTE — PROGRESS NOTES
The ABCs of the Annual Wellness Visit  Subsequent Medicare Wellness Visit    Chief Complaint   Patient presents with   • Annual Exam     AWV   • Hypertension      Subjective    History of Present Illness:  Jim Romero is a 89 y.o. male who presents for a Subsequent Medicare Wellness Visit.    The following portions of the patient's history were reviewed and   updated as appropriate: allergies, current medications, past family history, past medical history, past social history, past surgical history and problem list.    Compared to one year ago, the patient feels his physical   health is the same.    Compared to one year ago, the patient feels his mental   health is the same.    Recent Hospitalizations:  He was not admitted to the hospital during the last year.       Current Medical Providers:  Patient Care Team:  Reji Gilman MD as PCP - General (Internal Medicine)  Reji Gilman MD as PCP - Internal Medicine (Internal Medicine)    Outpatient Medications Prior to Visit   Medication Sig Dispense Refill   • Aspirin Buf,CaCarb-MgCarb-MgO, 81 MG tablet Take 81 mg by mouth Daily.     • calcium carbonate-cholecalciferol 500-400 MG-UNIT tablet tablet Take 1 tablet by mouth Daily.     • pantoprazole (PROTONIX) 40 MG EC tablet TAKE ONE TABLET BY MOUTH DAILY 90 tablet 2   • pramipexole (MIRAPEX) 1.5 MG tablet TAKE ONE TABLET BY MOUTH ONCE NIGHTLY 90 tablet 1   • tamsulosin (FLOMAX) 0.4 MG capsule 24 hr capsule TAKE ONE CAPSULE BY MOUTH TWICE A  capsule 1     No facility-administered medications prior to visit.       No opioid medication identified on active medication list. I have reviewed chart for other potential  high risk medication/s and harmful drug interactions in the elderly.          Aspirin is on active medication list. Aspirin use is indicated based on review of current medical condition/s. Pros and cons of this therapy have been discussed today. Benefits of this medication outweigh potential harm.   "Patient has been encouraged to continue taking this medication.  .      Patient Active Problem List   Diagnosis   • RLS (restless legs syndrome)   • BPH (benign prostatic hyperplasia)   • GERD (gastroesophageal reflux disease)   • Sciatica   • Chronic fatigue   • IGT (impaired glucose tolerance)   • PMR (polymyalgia rheumatica) (CMS/[facility])   • Osteoporosis     Advance Care Planning  Advance Directive is not on file.  ACP discussion was held with the patient during this visit. Patient has an advance directive (not in EMR), copy requested.          Objective    Vitals:    21 1457   Weight: 83 kg (183 lb)   Height: 182.9 cm (72\")     BMI Readings from Last 1 Encounters:   21 24.82 kg/m²   BMI is within normal parameters. No follow-up required.    Does the patient have evidence of cognitive impairment? No    Physical Exam  Lab Results   Component Value Date    GLU 99 2021    HGBA1C 6.20 (H) 2021            HEALTH RISK ASSESSMENT    Smoking Status:  Social History     Tobacco Use   Smoking Status Former Smoker   • Packs/day: 1.50   • Types: Cigarettes   • Start date:    • Quit date:    • Years since quittin.7   Smokeless Tobacco Never Used     Alcohol Consumption:  Social History     Substance and Sexual Activity   Alcohol Use Yes    Comment: 1-2 x month     Fall Risk Screen:    CJADI Fall Risk Assessment has not been completed.    Depression Screening:  PHQ-2/PHQ-9 Depression Screening 2020   Little interest or pleasure in doing things 0   Feeling down, depressed, or hopeless 0   Trouble falling or staying asleep, or sleeping too much -   Feeling tired or having little energy -   Poor appetite or overeating -   Feeling bad about yourself - or that you are a failure or have let yourself or your family down -   Trouble concentrating on things, such as reading the newspaper or watching television -   Moving or speaking so slowly that other people could have noticed. Or the opposite - " being so fidgety or restless that you have been moving around a lot more than usual -   Thoughts that you would be better off dead, or of hurting yourself in some way -   Total Score 0   If you checked off any problems, how difficult have these problems made it for you to do your work, take care of things at home, or get along with other people? -       Health Habits and Functional and Cognitive Screening:  Functional & Cognitive Status 8/4/2020   Do you have difficulty preparing food and eating? No   Do you have difficulty bathing yourself, getting dressed or grooming yourself? No   Do you have difficulty using the toilet? No   Do you have difficulty moving around from place to place? No   Do you have trouble with steps or getting out of a bed or a chair? Yes   Current Diet Well Balanced Diet   Dental Exam Up to date   Eye Exam Up to date   Exercise (times per week) 2 times per week   Current Exercise Activities Include Gardening   Do you need help using the phone?  No   Are you deaf or do you have serious difficulty hearing?  Yes   Do you need help with transportation? No   Do you need help shopping? No   Do you need help preparing meals?  No   Do you need help with housework?  No   Do you need help with laundry? No   Do you need help taking your medications? No   Do you need help managing money? No   Do you ever drive or ride in a car without wearing a seat belt? -   Have you felt unusual stress, anger or loneliness in the last month? No   Who do you live with? Spouse   If you need help, do you have trouble finding someone available to you? No   Have you been bothered in the last four weeks by sexual problems? -   Do you have difficulty concentrating, remembering or making decisions? No       Age-appropriate Screening Schedule:  Refer to the list below for future screening recommendations based on patient's age, sex and/or medical conditions. Orders for these recommended tests are listed in the plan section. The  patient has been provided with a written plan.    Health Maintenance   Topic Date Due   • DXA SCAN  07/23/2020   • INFLUENZA VACCINE  10/01/2021   • TDAP/TD VACCINES (2 - Td or Tdap) 05/17/2028   • ZOSTER VACCINE  Discontinued              Assessment/Plan   CMS Preventative Services Quick Reference  Risk Factors Identified During Encounter  Fall Risk-High or Moderate  Hearing Problem  The above risks/problems have been discussed with the patient.  Follow up actions/plans if indicated are seen below in the Assessment/Plan Section.  Pertinent information has been shared with the patient in the After Visit Summary.    There are no diagnoses linked to this encounter.    Follow Up:   No follow-ups on file.     An After Visit Summary and PPPS were made available to the patient.        I spent 15 minutes caring for Gene on this date of service. This time includes time spent by me in the following activities:preparing for the visit

## 2021-09-13 NOTE — PROGRESS NOTES
Subjective   Jim Romero is a 89 y.o. male.     Chief Complaint   Patient presents with   • Annual Exam     AWV   • Hypertension         Hypertension  Pertinent negatives include no palpitations or shortness of breath.   Heartburn  He reports no wheezing.   Hyperglycemia  This is a chronic problem. The current episode started more than 1 year ago. Associated symptoms include arthralgias ( LEFT SHOULDER).   Arthritis  Presents for follow-up visit. The symptoms have been stable. Affected locations include the right knee, left knee, left DIP and right hip. Pertinent negatives include no diarrhea.        The following portions of the patient's history were reviewed and updated as appropriate: allergies, current medications, past social history and problem list.    Outpatient Medications Marked as Taking for the 9/13/21 encounter (Office Visit) with Reji Gilman MD   Medication Sig Dispense Refill   • Aspirin Buf,CaCarb-MgCarb-MgO, 81 MG tablet Take 81 mg by mouth Daily.     • calcium carbonate-cholecalciferol 500-400 MG-UNIT tablet tablet Take 1 tablet by mouth Daily.     • pantoprazole (PROTONIX) 40 MG EC tablet TAKE ONE TABLET BY MOUTH DAILY 90 tablet 2   • pramipexole (MIRAPEX) 1.5 MG tablet TAKE ONE TABLET BY MOUTH ONCE NIGHTLY 90 tablet 1   • tamsulosin (FLOMAX) 0.4 MG capsule 24 hr capsule TAKE ONE CAPSULE BY MOUTH TWICE A  capsule 1       Review of Systems   Respiratory: Negative for shortness of breath and wheezing.    Cardiovascular: Negative for palpitations and leg swelling.   Gastrointestinal: Negative for constipation and diarrhea.   Musculoskeletal: Positive for arthralgias ( LEFT SHOULDER), arthritis and back pain.       Objective   Vitals:    09/13/21 1457   BP: 100/62   Pulse: 58   Resp: 16   Temp: 98.4 °F (36.9 °C)   SpO2: 98%          09/13/21  1457   Weight: 83 kg (183 lb)    [unfilled]  Body mass index is 24.82 kg/m².      Physical Exam   Cardiovascular: Normal rate, regular rhythm  and normal heart sounds. Exam reveals no gallop.   No murmur heard.  Pulmonary/Chest: Effort normal and breath sounds normal. No respiratory distress. He has no wheezes.   Abdominal: Soft. Normal appearance. He exhibits no distension. There is no abdominal tenderness. There is no guarding.   Neurological: He is alert.         Problems Addressed this Visit        Endocrine and Metabolic    IGT (impaired glucose tolerance)       Gastrointestinal Abdominal     GERD (gastroesophageal reflux disease)       Multi-system (Lupus, Sarcoid...)    PMR (polymyalgia rheumatica) (CMS/[facility]) - Primary       Musculoskeletal and Injuries    Osteoporosis      Diagnoses       Codes Comments    PMR (polymyalgia rheumatica) (CMS/[facility])    -  Primary ICD-10-CM: M35.3  ICD-9-CM: 725     Gastroesophageal reflux disease, unspecified whether esophagitis present     ICD-10-CM: K21.9  ICD-9-CM: 530.81     IGT (impaired glucose tolerance)     ICD-10-CM: R73.02  ICD-9-CM: 790.22     Osteoporosis, unspecified osteoporosis type, unspecified pathological fracture presence     ICD-10-CM: M81.0  ICD-9-CM: 733.00         Assessment/Plan   In today for recheck of PMR, impaired glucose tolerance, restless leg syndrome, and osteoporosis.  No further ache in the lower back, hips, knees, thighs.  Left shoulder pain has resolved.  He has been off of prednisone for about 15 months now.  Now off of Prolia.  Off metformin now.  We will check sed rate, CRP, glucose, A1c every 3 months.  Annual lab work today September 2021 Included CBC, CMP, A1c, CRP, sed rate.  Will recheck again in 3 months.  He started on prednisone on July 9, 2018.  He complains of forearm purpura.  Takes aspirin 81 mg twice weekly.  Advised to stop the aspirin completely now.  Part of this is related to age and part of this is related to previous steroid use.  Annual wellness visit today September 2021.  He is 3.5 HPP today.    The above information was reviewed again today 09/13/21.  It  continues to be accurate as reflected above and is unchanged.  History, physical and review of systems all reviewed and are unchanged.  Medications were reviewed today and continue the current dosing.    PPE today includes face mask and eye shield.         Dragon disclaimer:   Much of this encounter note is an electronic transcription/translation of spoken language to printed text. The electronic translation of spoken language may permit erroneous, or at times, nonsensical words or phrases to be inadvertently transcribed; Although I have reviewed the note for such errors, some may still exist.

## 2021-09-14 LAB
ALBUMIN SERPL-MCNC: 3.7 G/DL (ref 3.5–5.2)
ALBUMIN/GLOB SERPL: 1.8 G/DL
ALP SERPL-CCNC: 64 U/L (ref 39–117)
ALT SERPL-CCNC: 12 U/L (ref 1–41)
AST SERPL-CCNC: 15 U/L (ref 1–40)
BASOPHILS # BLD AUTO: 0.06 10*3/MM3 (ref 0–0.2)
BASOPHILS NFR BLD AUTO: 0.7 % (ref 0–1.5)
BILIRUB SERPL-MCNC: 0.4 MG/DL (ref 0–1.2)
BUN SERPL-MCNC: 25 MG/DL (ref 8–23)
BUN/CREAT SERPL: 20.8 (ref 7–25)
CALCIUM SERPL-MCNC: 9.1 MG/DL (ref 8.2–9.6)
CHLORIDE SERPL-SCNC: 104 MMOL/L (ref 98–107)
CO2 SERPL-SCNC: 23.2 MMOL/L (ref 22–29)
CREAT SERPL-MCNC: 1.2 MG/DL (ref 0.76–1.27)
CRP SERPL-MCNC: <0.3 MG/DL (ref 0–0.5)
EOSINOPHIL # BLD AUTO: 0.13 10*3/MM3 (ref 0–0.4)
EOSINOPHIL NFR BLD AUTO: 1.5 % (ref 0.3–6.2)
ERYTHROCYTE [DISTWIDTH] IN BLOOD BY AUTOMATED COUNT: 14.2 % (ref 12.3–15.4)
ERYTHROCYTE [SEDIMENTATION RATE] IN BLOOD BY WESTERGREN METHOD: 9 MM/HR (ref 0–20)
GLOBULIN SER CALC-MCNC: 2.1 GM/DL
GLUCOSE SERPL-MCNC: 100 MG/DL (ref 65–99)
HBA1C MFR BLD: 6 % (ref 4.8–5.6)
HCT VFR BLD AUTO: 35.5 % (ref 37.5–51)
HGB BLD-MCNC: 11.3 G/DL (ref 13–17.7)
IMM GRANULOCYTES # BLD AUTO: 0.03 10*3/MM3 (ref 0–0.05)
IMM GRANULOCYTES NFR BLD AUTO: 0.3 % (ref 0–0.5)
LYMPHOCYTES # BLD AUTO: 1.35 10*3/MM3 (ref 0.7–3.1)
LYMPHOCYTES NFR BLD AUTO: 15.6 % (ref 19.6–45.3)
MCH RBC QN AUTO: 25.9 PG (ref 26.6–33)
MCHC RBC AUTO-ENTMCNC: 31.8 G/DL (ref 31.5–35.7)
MCV RBC AUTO: 81.4 FL (ref 79–97)
MONOCYTES # BLD AUTO: 0.53 10*3/MM3 (ref 0.1–0.9)
MONOCYTES NFR BLD AUTO: 6.1 % (ref 5–12)
NEUTROPHILS # BLD AUTO: 6.55 10*3/MM3 (ref 1.7–7)
NEUTROPHILS NFR BLD AUTO: 75.8 % (ref 42.7–76)
NRBC BLD AUTO-RTO: 0 /100 WBC (ref 0–0.2)
PLATELET # BLD AUTO: 219 10*3/MM3 (ref 140–450)
POTASSIUM SERPL-SCNC: 4.5 MMOL/L (ref 3.5–5.2)
PROT SERPL-MCNC: 5.8 G/DL (ref 6–8.5)
RBC # BLD AUTO: 4.36 10*6/MM3 (ref 4.14–5.8)
SODIUM SERPL-SCNC: 139 MMOL/L (ref 136–145)
WBC # BLD AUTO: 8.65 10*3/MM3 (ref 3.4–10.8)

## 2021-09-22 RX ORDER — PRAMIPEXOLE DIHYDROCHLORIDE 1.5 MG/1
TABLET ORAL
Qty: 90 TABLET | Refills: 1 | Status: SHIPPED | OUTPATIENT
Start: 2021-09-22 | End: 2022-03-14

## 2021-10-18 RX ORDER — TAMSULOSIN HYDROCHLORIDE 0.4 MG/1
CAPSULE ORAL
Qty: 180 CAPSULE | Refills: 1 | Status: SHIPPED | OUTPATIENT
Start: 2021-10-18 | End: 2022-05-09

## 2021-12-15 ENCOUNTER — TELEMEDICINE (OUTPATIENT)
Dept: INTERNAL MEDICINE | Facility: CLINIC | Age: 86
End: 2021-12-15

## 2021-12-15 DIAGNOSIS — M81.0 OSTEOPOROSIS, UNSPECIFIED OSTEOPOROSIS TYPE, UNSPECIFIED PATHOLOGICAL FRACTURE PRESENCE: ICD-10-CM

## 2021-12-15 DIAGNOSIS — R73.02 IGT (IMPAIRED GLUCOSE TOLERANCE): ICD-10-CM

## 2021-12-15 DIAGNOSIS — K21.9 GASTROESOPHAGEAL REFLUX DISEASE, UNSPECIFIED WHETHER ESOPHAGITIS PRESENT: ICD-10-CM

## 2021-12-15 DIAGNOSIS — M35.3 PMR (POLYMYALGIA RHEUMATICA) (HCC): Primary | ICD-10-CM

## 2021-12-15 PROCEDURE — 99213 OFFICE O/P EST LOW 20 MIN: CPT | Performed by: INTERNAL MEDICINE

## 2021-12-15 NOTE — PROGRESS NOTES
Subjective   Jim Romero is a 89 y.o. male.     Chief Complaint   Patient presents with   • Heartburn   • IGT         Heartburn  He reports no chest pain.   Hyperglycemia  This is a chronic problem. The current episode started more than 1 year ago. Pertinent negatives include no arthralgias ( left shoulder pain), chest pain, headaches or myalgias.   Pain  This is a chronic problem. The current episode started more than 1 month ago. Pertinent negatives include no arthralgias ( left shoulder pain), chest pain, headaches or myalgias.   Arthritis  Presents for follow-up visit. The symptoms have been stable. Affected locations include the right knee, left knee, left DIP and right hip. Pertinent negatives include no diarrhea.        The following portions of the patient's history were reviewed and updated as appropriate: allergies, current medications, past social history and problem list.    Outpatient Medications Marked as Taking for the 12/15/21 encounter (Telemedicine) with Reji Gilman MD   Medication Sig Dispense Refill   • calcium carbonate-cholecalciferol 500-400 MG-UNIT tablet tablet Take 1 tablet by mouth Daily.     • pantoprazole (PROTONIX) 40 MG EC tablet TAKE ONE TABLET BY MOUTH DAILY 90 tablet 2   • pramipexole (MIRAPEX) 1.5 MG tablet TAKE ONE TABLET BY MOUTH ONCE NIGHTLY 90 tablet 1   • tamsulosin (FLOMAX) 0.4 MG capsule 24 hr capsule TAKE ONE CAPSULE BY MOUTH TWICE A  capsule 1       Review of Systems   Respiratory: Negative for shortness of breath.    Cardiovascular: Negative for chest pain, palpitations and leg swelling.   Gastrointestinal: Negative for constipation and diarrhea.   Musculoskeletal: Positive for arthritis. Negative for arthralgias ( left shoulder pain) and myalgias.   Neurological: Positive for light-headedness. Negative for headaches.       Objective   There were no vitals filed for this visit.   There were no vitals filed for this visit. [unfilled]  There is no height or  weight on file to calculate BMI.      Physical Exam   Pulmonary/Chest: Effort normal.   Abdominal: Normal appearance.   Neurological: He is alert.   Psychiatric: His behavior is normal. Mood, judgment and thought content normal.         Problems Addressed this Visit        Endocrine and Metabolic    IGT (impaired glucose tolerance)       Gastrointestinal Abdominal     GERD (gastroesophageal reflux disease)       Multi-system (Lupus, Sarcoid...)    PMR (polymyalgia rheumatica) ([facility]) - Primary       Musculoskeletal and Injuries    Osteoporosis      Diagnoses       Codes Comments    PMR (polymyalgia rheumatica) ([facility])    -  Primary ICD-10-CM: M35.3  ICD-9-CM: 725     IGT (impaired glucose tolerance)     ICD-10-CM: R73.02  ICD-9-CM: 790.22     Gastroesophageal reflux disease, unspecified whether esophagitis present     ICD-10-CM: K21.9  ICD-9-CM: 530.81     Osteoporosis, unspecified osteoporosis type, unspecified pathological fracture presence     ICD-10-CM: M81.0  ICD-9-CM: 733.00         Assessment/Plan   Video visit today due to the Covid pandemic.  In today for recheck of PMR, impaired glucose tolerance, restless leg syndrome, and osteoporosis.  No further ache in the lower back, hips, knees, thighs.  Left shoulder pain has resolved.  He has been off of prednisone for about 16 months now.  Now off of Prolia.  Off metformin now.  We will check glucose, A1c every 3 months.  Annual lab work September 2021 included CBC, CMP, A1c, CRP, sed rate.  Will recheck again in 3 months.  He started on prednisone on July 9, 2018.  Annual wellness visit September 2022.  No changes made.  Remains on Protonix, Flomax, and Mirapex and those are reviewed today.  Spent 12 minutes with patient on the visit today.   [employer] platform used.    The above information was reviewed again today 12/15/21.  It continues to be accurate as reflected above and is unchanged.  History, physical and review of systems all reviewed and are unchanged.   Medications were reviewed today and continue the current dosing.         Dragon disclaimer:   Much of this encounter note is an electronic transcription/translation of spoken language to printed text. The electronic translation of spoken language may permit erroneous, or at times, nonsensical words or phrases to be inadvertently transcribed; Although I have reviewed the note for such errors, some may still exist.

## 2021-12-17 ENCOUNTER — LAB (OUTPATIENT)
Dept: INTERNAL MEDICINE | Facility: CLINIC | Age: 86
End: 2021-12-17

## 2021-12-17 PROCEDURE — G0008 ADMIN INFLUENZA VIRUS VAC: HCPCS | Performed by: INTERNAL MEDICINE

## 2021-12-17 PROCEDURE — 90662 IIV NO PRSV INCREASED AG IM: CPT | Performed by: INTERNAL MEDICINE

## 2021-12-18 LAB
GLUCOSE SERPL-MCNC: 79 MG/DL (ref 65–99)
HBA1C MFR BLD: 6.5 % (ref 4.8–5.6)

## 2022-03-14 RX ORDER — PRAMIPEXOLE DIHYDROCHLORIDE 1.5 MG/1
TABLET ORAL
Qty: 90 TABLET | Refills: 1 | Status: SHIPPED | OUTPATIENT
Start: 2022-03-14 | End: 2022-08-22

## 2022-03-18 ENCOUNTER — TELEPHONE (OUTPATIENT)
Dept: INTERNAL MEDICINE | Facility: CLINIC | Age: 87
End: 2022-03-18

## 2022-03-18 ENCOUNTER — TELEMEDICINE (OUTPATIENT)
Dept: INTERNAL MEDICINE | Facility: CLINIC | Age: 87
End: 2022-03-18

## 2022-03-18 DIAGNOSIS — R73.02 IGT (IMPAIRED GLUCOSE TOLERANCE): Primary | ICD-10-CM

## 2022-03-18 DIAGNOSIS — K21.9 GASTROESOPHAGEAL REFLUX DISEASE, UNSPECIFIED WHETHER ESOPHAGITIS PRESENT: ICD-10-CM

## 2022-03-18 DIAGNOSIS — M81.0 OSTEOPOROSIS, UNSPECIFIED OSTEOPOROSIS TYPE, UNSPECIFIED PATHOLOGICAL FRACTURE PRESENCE: ICD-10-CM

## 2022-03-18 DIAGNOSIS — G25.81 RLS (RESTLESS LEGS SYNDROME): ICD-10-CM

## 2022-03-18 PROBLEM — M35.3 PMR (POLYMYALGIA RHEUMATICA): Status: RESOLVED | Noted: 2018-07-09 | Resolved: 2022-03-18

## 2022-03-18 PROCEDURE — 99214 OFFICE O/P EST MOD 30 MIN: CPT | Performed by: INTERNAL MEDICINE

## 2022-03-18 NOTE — PROGRESS NOTES
Subjective   Jim Romero is a 89 y.o. male.     Chief Complaint   Patient presents with   • Benign Prostatic Hypertrophy   • Hyperglycemia         Benign Prostatic Hypertrophy  This is a chronic problem. The problem is unchanged.   Hyperglycemia  This is a chronic problem. The current episode started more than 1 year ago. Associated symptoms include arthralgias ( LEFT SHOULDER).   Hypertension  This is a chronic problem. The current episode started more than 1 year ago. The problem is unchanged. Pertinent negatives include no palpitations or shortness of breath.   Heartburn  He complains of heartburn. He reports no wheezing.   Arthritis  Presents for follow-up visit. The symptoms have been stable. Affected locations include the right knee, left knee, left DIP and right hip. Pertinent negatives include no diarrhea.        The following portions of the patient's history were reviewed and updated as appropriate: allergies, current medications, past social history and problem list.    Outpatient Medications Marked as Taking for the 3/18/22 encounter (Telemedicine) with Reji Gilman MD   Medication Sig Dispense Refill   • calcium carbonate-cholecalciferol 500-400 MG-UNIT tablet tablet Take 1 tablet by mouth Daily.     • pantoprazole (PROTONIX) 40 MG EC tablet TAKE ONE TABLET BY MOUTH DAILY 90 tablet 2   • pramipexole (MIRAPEX) 1.5 MG tablet TAKE ONE TABLET BY MOUTH ONCE NIGHTLY 90 tablet 1   • tamsulosin (FLOMAX) 0.4 MG capsule 24 hr capsule TAKE ONE CAPSULE BY MOUTH TWICE A  capsule 1       Review of Systems   Respiratory: Negative for shortness of breath and wheezing.    Cardiovascular: Negative for palpitations and leg swelling.   Gastrointestinal: Positive for heartburn. Negative for constipation and diarrhea.   Musculoskeletal: Positive for arthralgias ( LEFT SHOULDER), arthritis and back pain.       Objective   There were no vitals filed for this visit.   There were no vitals filed for this visit.  [unfilled]  There is no height or weight on file to calculate BMI.      Physical Exam   Pulmonary/Chest: Effort normal.   Abdominal: Normal appearance.   Neurological: He is alert.   Psychiatric: His behavior is normal. Mood, judgment and thought content normal.         Problems Addressed this Visit        Endocrine and Metabolic    IGT (impaired glucose tolerance) - Primary       Gastrointestinal Abdominal     GERD (gastroesophageal reflux disease)       Musculoskeletal and Injuries    Osteoporosis       Neuro    RLS (restless legs syndrome)      Diagnoses       Codes Comments    IGT (impaired glucose tolerance)    -  Primary ICD-10-CM: R73.02  ICD-9-CM: 790.22     Gastroesophageal reflux disease, unspecified whether esophagitis present     ICD-10-CM: K21.9  ICD-9-CM: 530.81     Osteoporosis, unspecified osteoporosis type, unspecified pathological fracture presence     ICD-10-CM: M81.0  ICD-9-CM: 733.00     RLS (restless legs syndrome)     ICD-10-CM: G25.81  ICD-9-CM: 333.94         Assessment/Plan   Video visit today due to Covid pandemic.  In for recheck of PMR, IGT, restless leg syndrome, BPH and osteoporosis.  No further ache in the lower back, hips, knees, thighs.  He has been off of prednisone for about 21 months now.  Now off of Prolia.  Off metformin now.  We will check glucose and A1c every 6 months.  Annual lab work September 2022 Included CBC, CMP, A1c.  Will recheck again in 6 months.  He started on prednisone on July 9, 2018.  Annual wellness visit September 2022.  Remains on Mirapex 1.5 mg daily at bedtime for restless leg syndrome along with him to less than 0.4 mg daily for his BPH symptoms.  Those are reviewed today.  He will need lab visits in the future given his severe hearing deficit.  Difficult to have a conversation on the phone.  [employer] platform used today for the visit.    The above information was reviewed again today 03/18/22.  It continues to be accurate as reflected above and is  unchanged.  History, physical and review of systems all reviewed and are unchanged.  Medications were reviewed today and continue the current dosing.    PPE today includes face mask and eye shield.         Dragon disclaimer:   Much of this encounter note is an electronic transcription/translation of spoken language to printed text. The electronic translation of spoken language may permit erroneous, or at times, nonsensical words or phrases to be inadvertently transcribed; Although I have reviewed the note for such errors, some may still exist.

## 2022-03-18 NOTE — TELEPHONE ENCOUNTER
Caller: Jim Romero    Relationship: Self    Best call back number: [phone] (H)      PATIENT RETURNING A MISSED CALL. NO HUB TO READ MESSAGES IN PATIENTS CHART. NOT SURE WHO WAS REACHING OUT TO HIM HE SAYS BUT HE'S IN TRAFFIC AND WOULD LIKE US TO GIVE HIM ANOTHER CALLBACK HERE SHORTLY.

## 2022-05-09 RX ORDER — TAMSULOSIN HYDROCHLORIDE 0.4 MG/1
CAPSULE ORAL
Qty: 180 CAPSULE | Refills: 1 | Status: SHIPPED | OUTPATIENT
Start: 2022-05-09 | End: 2022-11-14

## 2022-07-11 RX ORDER — PANTOPRAZOLE SODIUM 40 MG/1
TABLET, DELAYED RELEASE ORAL
Qty: 90 TABLET | Refills: 2 | Status: SHIPPED | OUTPATIENT
Start: 2022-07-11

## 2022-08-22 RX ORDER — PRAMIPEXOLE DIHYDROCHLORIDE 1.5 MG/1
TABLET ORAL
Qty: 90 TABLET | Refills: 1 | Status: SHIPPED | OUTPATIENT
Start: 2022-08-22 | End: 2023-02-02

## 2022-09-15 ENCOUNTER — OFFICE VISIT (OUTPATIENT)
Dept: INTERNAL MEDICINE | Facility: CLINIC | Age: 87
End: 2022-09-15

## 2022-09-15 VITALS
WEIGHT: 175 LBS | HEIGHT: 72 IN | DIASTOLIC BLOOD PRESSURE: 60 MMHG | TEMPERATURE: 98 F | SYSTOLIC BLOOD PRESSURE: 112 MMHG | RESPIRATION RATE: 16 BRPM | BODY MASS INDEX: 23.7 KG/M2 | HEART RATE: 81 BPM | OXYGEN SATURATION: 99 %

## 2022-09-15 DIAGNOSIS — Z79.899 MEDICATION MANAGEMENT: ICD-10-CM

## 2022-09-15 DIAGNOSIS — M81.0 OSTEOPOROSIS, UNSPECIFIED OSTEOPOROSIS TYPE, UNSPECIFIED PATHOLOGICAL FRACTURE PRESENCE: ICD-10-CM

## 2022-09-15 DIAGNOSIS — R73.02 IGT (IMPAIRED GLUCOSE TOLERANCE): Primary | ICD-10-CM

## 2022-09-15 DIAGNOSIS — G25.81 RLS (RESTLESS LEGS SYNDROME): ICD-10-CM

## 2022-09-15 DIAGNOSIS — K21.9 GASTROESOPHAGEAL REFLUX DISEASE, UNSPECIFIED WHETHER ESOPHAGITIS PRESENT: ICD-10-CM

## 2022-09-15 PROCEDURE — 99214 OFFICE O/P EST MOD 30 MIN: CPT | Performed by: INTERNAL MEDICINE

## 2022-09-15 NOTE — PROGRESS NOTES
Subjective   Jim Romero is a 89 y.o. male.     Chief Complaint   Patient presents with   • IGT    • Heartburn         Heartburn  He reports no chest pain, no coughing or no wheezing.   Hyperglycemia  This is a chronic problem. The current episode started more than 1 year ago. Associated symptoms include arthralgias ( left shoulder pain) and myalgias. Pertinent negatives include no chest pain or coughing.   Pain  This is a chronic problem. The current episode started more than 1 month ago. The problem has been unchanged. Associated symptoms include arthralgias ( left shoulder pain) and myalgias. Pertinent negatives include no chest pain or coughing.   Arthritis  Presents for follow-up visit. The symptoms have been stable. Affected locations include the right knee, left knee, left DIP and right hip. Pertinent negatives include no diarrhea.        The following portions of the patient's history were reviewed and updated as appropriate: allergies, current medications, past social history and problem list.    Outpatient Medications Marked as Taking for the 9/15/22 encounter (Office Visit) with Reji Gilman MD   Medication Sig Dispense Refill   • calcium carbonate-cholecalciferol 500-400 MG-UNIT tablet tablet Take 1 tablet by mouth Daily.     • pantoprazole (PROTONIX) 40 MG EC tablet TAKE ONE TABLET BY MOUTH DAILY 90 tablet 2   • pramipexole (MIRAPEX) 1.5 MG tablet TAKE ONE TABLET BY MOUTH ONCE NIGHTLY 90 tablet 1   • tamsulosin (FLOMAX) 0.4 MG capsule 24 hr capsule TAKE ONE CAPSULE BY MOUTH TWICE A  capsule 1       Review of Systems   Respiratory: Negative for cough, shortness of breath and wheezing.    Cardiovascular: Negative for chest pain, palpitations and leg swelling.   Gastrointestinal: Negative for constipation and diarrhea.   Musculoskeletal: Positive for arthralgias ( left shoulder pain), arthritis, back pain and myalgias.   Psychiatric/Behavioral: Nervous/anxious:          Objective   Vitals:     09/15/22 0838   BP: 112/60   Pulse: 81   Resp: 16   Temp: 98 °F (36.7 °C)   SpO2: 99%          09/15/22  0838   Weight: 79.4 kg (175 lb)    [unfilled]  Body mass index is 23.73 kg/m².      Physical Exam   Pulmonary/Chest: Effort normal.   Abdominal: Normal appearance.   Neurological: He is alert.   Psychiatric: His behavior is normal. Mood, judgment and thought content normal.         Problems Addressed this Visit        Endocrine and Metabolic    IGT (impaired glucose tolerance) - Primary (Chronic)       Gastrointestinal Abdominal     GERD (gastroesophageal reflux disease) (Chronic)       Musculoskeletal and Injuries    Osteoporosis (Chronic)       Neuro    RLS (restless legs syndrome) (Chronic)      Diagnoses       Codes Comments    IGT (impaired glucose tolerance)    -  Primary ICD-10-CM: R73.02  ICD-9-CM: 790.22     Gastroesophageal reflux disease, unspecified whether esophagitis present     ICD-10-CM: K21.9  ICD-9-CM: 530.81     Osteoporosis, unspecified osteoporosis type, unspecified pathological fracture presence     ICD-10-CM: M81.0  ICD-9-CM: 733.00     RLS (restless legs syndrome)     ICD-10-CM: G25.81  ICD-9-CM: 333.94         Assessment & Plan   In today for recheck of PMR, impaired glucose tolerance, restless leg syndrome, and osteoporosis.  No further ache in the lower back, hips, knees, thighs.  He has been off of prednisone for about 18 months now.  Now off of Prolia.  We will stop checking sed rate, CRP, glucose, A1c every 3 months.  Annual lab work today September 2022 including CBC, CMP, A1c.  Will recheck again in 6 months.  He started on prednisone on July 9, 2018.  Annual wellness visit is due now September 2022.  He declines future COVID vaccines.    The above information was reviewed again today 09/15/22.  It continues to be accurate as reflected above and is unchanged.  History, physical and review of systems all reviewed and are unchanged.  Medications were reviewed today and continue  the current dosing.    PPE today includes face mask and eye shield.         Dragon disclaimer:   Much of this encounter note is an electronic transcription/translation of spoken language to printed text. The electronic translation of spoken language may permit erroneous, or at times, nonsensical words or phrases to be inadvertently transcribed; Although I have reviewed the note for such errors, some may still exist.

## 2022-11-14 RX ORDER — TAMSULOSIN HYDROCHLORIDE 0.4 MG/1
CAPSULE ORAL
Qty: 180 CAPSULE | Refills: 1 | Status: SHIPPED | OUTPATIENT
Start: 2022-11-14

## 2022-11-15 ENCOUNTER — HOSPITAL ENCOUNTER (OUTPATIENT)
Facility: HOSPITAL | Age: 87
Setting detail: OBSERVATION
Discharge: HOME | End: 2022-11-16
Attending: EMERGENCY MEDICINE | Admitting: EMERGENCY MEDICINE

## 2022-11-15 ENCOUNTER — TELEPHONE (OUTPATIENT)
Dept: INTERNAL MEDICINE | Facility: CLINIC | Age: 87
End: 2022-11-15

## 2022-11-15 ENCOUNTER — APPOINTMENT (OUTPATIENT)
Dept: GENERAL RADIOLOGY | Facility: HOSPITAL | Age: 87
End: 2022-11-15

## 2022-11-15 DIAGNOSIS — R07.9 CHEST PAIN IN ADULT: Primary | ICD-10-CM

## 2022-11-15 LAB
ALBUMIN SERPL-MCNC: 3.8 G/DL (ref 3.5–5.2)
ALBUMIN/GLOB SERPL: 1.5 G/DL
ALP SERPL-CCNC: 63 U/L (ref 39–117)
ALT SERPL W P-5'-P-CCNC: 10 U/L (ref 1–41)
ANION GAP SERPL CALCULATED.3IONS-SCNC: 11.2 MMOL/L (ref 5–15)
AST SERPL-CCNC: 15 U/L (ref 1–40)
BASOPHILS # BLD AUTO: 0.04 10*3/MM3 (ref 0–0.2)
BASOPHILS NFR BLD AUTO: 0.6 % (ref 0–1.5)
BILIRUB SERPL-MCNC: 0.5 MG/DL (ref 0–1.2)
BUN SERPL-MCNC: 17 MG/DL (ref 8–23)
BUN/CREAT SERPL: 14.7 (ref 7–25)
CALCIUM SPEC-SCNC: 9.2 MG/DL (ref 8.2–9.6)
CHLORIDE SERPL-SCNC: 102 MMOL/L (ref 98–107)
CO2 SERPL-SCNC: 26.8 MMOL/L (ref 22–29)
CREAT SERPL-MCNC: 1.16 MG/DL (ref 0.76–1.27)
DEPRECATED RDW RBC AUTO: 39.7 FL (ref 37–54)
EGFRCR SERPLBLD CKD-EPI 2021: 59.5 ML/MIN/1.73
EOSINOPHIL # BLD AUTO: 0.11 10*3/MM3 (ref 0–0.4)
EOSINOPHIL NFR BLD AUTO: 1.6 % (ref 0.3–6.2)
ERYTHROCYTE [DISTWIDTH] IN BLOOD BY AUTOMATED COUNT: 13.6 % (ref 12.3–15.4)
GLOBULIN UR ELPH-MCNC: 2.6 GM/DL
GLUCOSE SERPL-MCNC: 148 MG/DL (ref 65–99)
HCT VFR BLD AUTO: 37.4 % (ref 37.5–51)
HGB BLD-MCNC: 12.3 G/DL (ref 13–17.7)
HOLD SPECIMEN: NORMAL
HOLD SPECIMEN: NORMAL
IMM GRANULOCYTES # BLD AUTO: 0.03 10*3/MM3 (ref 0–0.05)
IMM GRANULOCYTES NFR BLD AUTO: 0.4 % (ref 0–0.5)
LYMPHOCYTES # BLD AUTO: 1.32 10*3/MM3 (ref 0.7–3.1)
LYMPHOCYTES NFR BLD AUTO: 19.5 % (ref 19.6–45.3)
MCH RBC QN AUTO: 26.2 PG (ref 26.6–33)
MCHC RBC AUTO-ENTMCNC: 32.9 G/DL (ref 31.5–35.7)
MCV RBC AUTO: 79.7 FL (ref 79–97)
MONOCYTES # BLD AUTO: 0.35 10*3/MM3 (ref 0.1–0.9)
MONOCYTES NFR BLD AUTO: 5.2 % (ref 5–12)
NEUTROPHILS NFR BLD AUTO: 4.93 10*3/MM3 (ref 1.7–7)
NEUTROPHILS NFR BLD AUTO: 72.7 % (ref 42.7–76)
NRBC BLD AUTO-RTO: 0.1 /100 WBC (ref 0–0.2)
PLATELET # BLD AUTO: 206 10*3/MM3 (ref 140–450)
PMV BLD AUTO: 10.2 FL (ref 6–12)
POTASSIUM SERPL-SCNC: 4.1 MMOL/L (ref 3.5–5.2)
PROT SERPL-MCNC: 6.4 G/DL (ref 6–8.5)
QT INTERVAL: 411 MS
QT INTERVAL: 413 MS
RBC # BLD AUTO: 4.69 10*6/MM3 (ref 4.14–5.8)
SODIUM SERPL-SCNC: 140 MMOL/L (ref 136–145)
TROPONIN T SERPL-MCNC: 0.02 NG/ML (ref 0–0.03)
WBC NRBC COR # BLD: 6.78 10*3/MM3 (ref 3.4–10.8)
WHOLE BLOOD HOLD COAG: NORMAL
WHOLE BLOOD HOLD SPECIMEN: NORMAL

## 2022-11-15 PROCEDURE — G0378 HOSPITAL OBSERVATION PER HR: HCPCS

## 2022-11-15 PROCEDURE — 93005 ELECTROCARDIOGRAM TRACING: CPT | Performed by: EMERGENCY MEDICINE

## 2022-11-15 PROCEDURE — 85025 COMPLETE CBC W/AUTO DIFF WBC: CPT

## 2022-11-15 PROCEDURE — 93010 ELECTROCARDIOGRAM REPORT: CPT | Performed by: INTERNAL MEDICINE

## 2022-11-15 PROCEDURE — 99284 EMERGENCY DEPT VISIT MOD MDM: CPT

## 2022-11-15 PROCEDURE — 80053 COMPREHEN METABOLIC PANEL: CPT | Performed by: EMERGENCY MEDICINE

## 2022-11-15 PROCEDURE — 84484 ASSAY OF TROPONIN QUANT: CPT | Performed by: EMERGENCY MEDICINE

## 2022-11-15 PROCEDURE — 71046 X-RAY EXAM CHEST 2 VIEWS: CPT

## 2022-11-15 PROCEDURE — 84484 ASSAY OF TROPONIN QUANT: CPT | Performed by: PHYSICIAN ASSISTANT

## 2022-11-15 PROCEDURE — 93005 ELECTROCARDIOGRAM TRACING: CPT

## 2022-11-15 RX ORDER — ONDANSETRON 2 MG/ML
4 INJECTION INTRAMUSCULAR; INTRAVENOUS EVERY 6 HOURS PRN
Status: DISCONTINUED | OUTPATIENT
Start: 2022-11-15 | End: 2022-11-16 | Stop reason: HOSPADM

## 2022-11-15 RX ORDER — PANTOPRAZOLE SODIUM 40 MG/1
40 TABLET, DELAYED RELEASE ORAL DAILY
Status: DISCONTINUED | OUTPATIENT
Start: 2022-11-15 | End: 2022-11-16 | Stop reason: HOSPADM

## 2022-11-15 RX ORDER — SODIUM CHLORIDE 0.9 % (FLUSH) 0.9 %
10 SYRINGE (ML) INJECTION AS NEEDED
Status: DISCONTINUED | OUTPATIENT
Start: 2022-11-15 | End: 2022-11-16 | Stop reason: HOSPADM

## 2022-11-15 RX ORDER — ACETAMINOPHEN 325 MG/1
650 TABLET ORAL EVERY 4 HOURS PRN
Status: DISCONTINUED | OUTPATIENT
Start: 2022-11-15 | End: 2022-11-16 | Stop reason: HOSPADM

## 2022-11-15 RX ORDER — SODIUM CHLORIDE 0.9 % (FLUSH) 0.9 %
10 SYRINGE (ML) INJECTION EVERY 12 HOURS SCHEDULED
Status: DISCONTINUED | OUTPATIENT
Start: 2022-11-15 | End: 2022-11-16 | Stop reason: HOSPADM

## 2022-11-15 RX ORDER — ASPIRIN 325 MG
325 TABLET ORAL ONCE
Status: COMPLETED | OUTPATIENT
Start: 2022-11-15 | End: 2022-11-15

## 2022-11-15 RX ORDER — NITROGLYCERIN 0.4 MG/1
0.4 TABLET SUBLINGUAL
Status: DISCONTINUED | OUTPATIENT
Start: 2022-11-15 | End: 2022-11-16 | Stop reason: HOSPADM

## 2022-11-15 RX ORDER — CHOLECALCIFEROL (VITAMIN D3) 125 MCG
5 CAPSULE ORAL NIGHTLY PRN
Status: DISCONTINUED | OUTPATIENT
Start: 2022-11-15 | End: 2022-11-16 | Stop reason: HOSPADM

## 2022-11-15 RX ORDER — ONDANSETRON 4 MG/1
4 TABLET, FILM COATED ORAL EVERY 6 HOURS PRN
Status: DISCONTINUED | OUTPATIENT
Start: 2022-11-15 | End: 2022-11-16 | Stop reason: HOSPADM

## 2022-11-15 RX ORDER — TAMSULOSIN HYDROCHLORIDE 0.4 MG/1
0.4 CAPSULE ORAL 2 TIMES DAILY
Status: DISCONTINUED | OUTPATIENT
Start: 2022-11-15 | End: 2022-11-16 | Stop reason: HOSPADM

## 2022-11-15 RX ORDER — PRAMIPEXOLE DIHYDROCHLORIDE 1.5 MG/1
1.5 TABLET ORAL NIGHTLY
Status: DISCONTINUED | OUTPATIENT
Start: 2022-11-15 | End: 2022-11-16 | Stop reason: HOSPADM

## 2022-11-15 RX ADMIN — TAMSULOSIN HYDROCHLORIDE 0.4 MG: 0.4 CAPSULE ORAL at 22:48

## 2022-11-15 RX ADMIN — PRAMIPEXOLE DIHYDROCHLORIDE 1.5 MG: 1.5 TABLET ORAL at 22:49

## 2022-11-15 RX ADMIN — Medication 10 ML: at 22:56

## 2022-11-15 RX ADMIN — PANTOPRAZOLE SODIUM 40 MG: 40 TABLET, DELAYED RELEASE ORAL at 22:48

## 2022-11-15 RX ADMIN — ASPIRIN 325 MG: 325 TABLET ORAL at 16:16

## 2022-11-15 NOTE — ED PROVIDER NOTES
" EMERGENCY DEPARTMENT ENCOUNTER    Room Number:  110/1  Date of encounter:  11/15/2022  PCP: Reji Gilman MD  Historian: Patient      HPI:  Chief Complaint: \"Heartburn\"  A complete HPI/ROS/PMH/PSH/SH/FH are unobtainable due to: None    Context: Jim Romero is a 89 y.o. male who presents to the ED c/o intermittent \"heartburn\" which he describes as a burning sensation in the lower chest and epigastric area.  He said he has noticed it off and on for the last week or so.  The pain tends to occur when he gets up and walks around and gets better with rest, however he specifically said that his been working out in the yard raking leaves and does not notice anything when he does that.    He is not having any symptoms at this time, but the discomfort does go down the left arm at times.  He did try to take some Tums for it and thinks it helped a little bit.  It does not appear to be associated with food, he is not short of breath, not nauseated, no vomiting, no fever and no other associated symptoms    Patient said the only reason he came to the emergency room today is that he went to have cochlear surgery today and was referred to the ER by the preoperative team because of the symptoms he was describing    Patient said he has no history of coronary artery disease.      PAST MEDICAL HISTORY  Active Ambulatory Problems     Diagnosis Date Noted   • RLS (restless legs syndrome) 02/02/2017   • BPH (benign prostatic hyperplasia) 02/02/2017   • GERD (gastroesophageal reflux disease) 02/02/2017   • Sciatica 02/02/2017   • Chronic fatigue 02/02/2017   • IGT (impaired glucose tolerance) 02/02/2017   • Osteoporosis 08/07/2018     Resolved Ambulatory Problems     Diagnosis Date Noted   • PMR (polymyalgia rheumatica) ([facility]) 07/09/2018     Past Medical History:   Diagnosis Date   • Benign prostatic hyperplasia    • Medication management          PAST SURGICAL HISTORY  History reviewed. No pertinent surgical history.      FAMILY " HISTORY  Family History   Problem Relation Age of Onset   • Melanoma Mother 77         SOCIAL HISTORY  Social History     Socioeconomic History   • Marital status:    Tobacco Use   • Smoking status: Former     Packs/day: 1.50     Types: Cigarettes     Start date:      Quit date:      Years since quittin.9   • Smokeless tobacco: Never   Substance and Sexual Activity   • Alcohol use: Yes     Comment: 1-2 x month   • Drug use: No         ALLERGIES  Patient has no known allergies.        REVIEW OF SYSTEMS  Review of Systems     All systems reviewed and negative except for those discussed in HPI.       PHYSICAL EXAM    I have reviewed the triage vital signs and nursing notes.    ED Triage Vitals   Temp Heart Rate Resp BP SpO2   11/15/22 1516 11/15/22 1515 11/15/22 1515 -- 11/15/22 1515   96.2 °F (35.7 °C) 80 18  96 %      Temp src Heart Rate Source Patient Position BP Location FiO2 (%)   -- -- -- -- --              Physical Exam  GENERAL: not distressed  HENT: nares patent  EYES: no scleral icterus  CV: regular rhythm, regular rate  RESPIRATORY: normal effort  ABDOMEN: soft  MUSCULOSKELETAL: no deformity  NEURO: alert, moves all extremities, follows commands  SKIN: warm, dry        LAB RESULTS  Recent Results (from the past 24 hour(s))   POCT GLUCOSE FINGERSTICK    Collection Time: 11/15/22  7:03 AM    Specimen type and source: Serum,    Result Value Ref Range    Glucose 96 71 - 139 mg/dL   ECG 12 Lead Chest Pain    Collection Time: 11/15/22  3:21 PM   Result Value Ref Range    QT Interval 411 ms   Comprehensive Metabolic Panel    Collection Time: 11/15/22  3:37 PM    Specimen: Arm, Left; Blood   Result Value Ref Range    Glucose 148 (H) 65 - 99 mg/dL    BUN 17 8 - 23 mg/dL    Creatinine 1.16 0.76 - 1.27 mg/dL    Sodium 140 136 - 145 mmol/L    Potassium 4.1 3.5 - 5.2 mmol/L    Chloride 102 98 - 107 mmol/L    CO2 26.8 22.0 - 29.0 mmol/L    Calcium 9.2 8.2 - 9.6 mg/dL    Total Protein 6.4 6.0 - 8.5 g/dL     Albumin 3.80 3.50 - 5.20 g/dL    ALT (SGPT) 10 1 - 41 U/L    AST (SGOT) 15 1 - 40 U/L    Alkaline Phosphatase 63 39 - 117 U/L    Total Bilirubin 0.5 0.0 - 1.2 mg/dL    Globulin 2.6 gm/dL    A/G Ratio 1.5 g/dL    BUN/Creatinine Ratio 14.7 7.0 - 25.0    Anion Gap 11.2 5.0 - 15.0 mmol/L    eGFR 59.5 (L) >60.0 mL/min/1.73   Troponin    Collection Time: 11/15/22  3:37 PM    Specimen: Arm, Left; Blood   Result Value Ref Range    Troponin T 0.019 0.000 - 0.030 ng/mL   Green Top (Gel)    Collection Time: 11/15/22  3:37 PM   Result Value Ref Range    Extra Tube Hold for add-ons.    Lavender Top    Collection Time: 11/15/22  3:37 PM   Result Value Ref Range    Extra Tube hold for add-on    Gold Top - SST    Collection Time: 11/15/22  3:37 PM   Result Value Ref Range    Extra Tube Hold for add-ons.    Light Blue Top    Collection Time: 11/15/22  3:37 PM   Result Value Ref Range    Extra Tube Hold for add-ons.    CBC Auto Differential    Collection Time: 11/15/22  3:37 PM    Specimen: Arm, Left; Blood   Result Value Ref Range    WBC 6.78 3.40 - 10.80 10*3/mm3    RBC 4.69 4.14 - 5.80 10*6/mm3    Hemoglobin 12.3 (L) 13.0 - 17.7 g/dL    Hematocrit 37.4 (L) 37.5 - 51.0 %    MCV 79.7 79.0 - 97.0 fL    MCH 26.2 (L) 26.6 - 33.0 pg    MCHC 32.9 31.5 - 35.7 g/dL    RDW 13.6 12.3 - 15.4 %    RDW-SD 39.7 37.0 - 54.0 fl    MPV 10.2 6.0 - 12.0 fL    Platelets 206 140 - 450 10*3/mm3    Neutrophil % 72.7 42.7 - 76.0 %    Lymphocyte % 19.5 (L) 19.6 - 45.3 %    Monocyte % 5.2 5.0 - 12.0 %    Eosinophil % 1.6 0.3 - 6.2 %    Basophil % 0.6 0.0 - 1.5 %    Immature Grans % 0.4 0.0 - 0.5 %    Neutrophils, Absolute 4.93 1.70 - 7.00 10*3/mm3    Lymphocytes, Absolute 1.32 0.70 - 3.10 10*3/mm3    Monocytes, Absolute 0.35 0.10 - 0.90 10*3/mm3    Eosinophils, Absolute 0.11 0.00 - 0.40 10*3/mm3    Basophils, Absolute 0.04 0.00 - 0.20 10*3/mm3    Immature Grans, Absolute 0.03 0.00 - 0.05 10*3/mm3    nRBC 0.1 0.0 - 0.2 /100 WBC   ECG 12 Lead Chest Pain     Collection Time: 11/15/22  4:24 PM   Result Value Ref Range    QT Interval 413 ms   Troponin    Collection Time: 11/15/22  5:04 PM    Specimen: Blood   Result Value Ref Range    Troponin T 0.020 0.000 - 0.030 ng/mL       Ordered the above labs and independently reviewed the results.        RADIOLOGY  XR Chest 2 View    Result Date: 11/15/2022  XR CHEST 2 VW-  HISTORY: Male who is 89 years-old,  chest pain  TECHNIQUE: Frontal and lateral views of the chest  COMPARISON: None available  FINDINGS: The heart size is normal. Aorta is tortuous, calcified. Pulmonary vasculature is unremarkable. No focal pulmonary consolidation. Minimal pleural effusion is suggested on the lateral view. No pneumothorax. No acute osseous process.      No focal pulmonary consolidation. Minimal pleural effusion. Tortuous aorta. Follow-up as clinically indicated.  This report was finalized on 11/15/2022 3:54 PM by Dr. Morris Doss M.D.        I ordered the above noted radiological studies. Reviewed by me and discussed with radiologist.  See dictation for official radiology interpretation.      PROCEDURES    Procedures      MEDICATIONS GIVEN IN ER    Medications   sodium chloride 0.9 % flush 10 mL (has no administration in time range)   nitroglycerin (NITROSTAT) SL tablet 0.4 mg (has no administration in time range)   sodium chloride 0.9 % flush 10 mL (has no administration in time range)   sodium chloride 0.9 % flush 10 mL (has no administration in time range)   ondansetron (ZOFRAN) tablet 4 mg (has no administration in time range)     Or   ondansetron (ZOFRAN) injection 4 mg (has no administration in time range)   acetaminophen (TYLENOL) tablet 650 mg (has no administration in time range)   melatonin tablet 5 mg (has no administration in time range)   pantoprazole (PROTONIX) EC tablet 40 mg (has no administration in time range)   pramipexole (MIRAPEX) tablet 1.5 mg (has no administration in time range)   tamsulosin (FLOMAX) 24 hr capsule  0.4 mg (has no administration in time range)   aspirin tablet 325 mg (325 mg Oral Given 11/15/22 1616)         PROGRESS, DATA ANALYSIS, CONSULTS, AND MEDICAL DECISION MAKING    All labs have been independently reviewed by me.  All radiology studies have been reviewed by me and discussed with radiologist dictating the report.   EKG's independently viewed and interpreted by me.  Discussion below represents my analysis of pertinent findings related to patient's condition, differential diagnosis, treatment plan and final disposition.        ED Course as of 11/15/22 2246   Tue Nov 15, 2022   2245 CBC and chemistry unremarkable, troponin is negative x2 [DP]   2245 Chest x-ray unremarkable [DP]   2245 Initial EKG at 1605 was very poor quality because of artifact, but it showed normal sinus rhythm with no acute ischemic change    Second EKG was performed at 1727 and it was much better quality  It showed normal sinus rhythm  Normal CO and QT  There were some nonspecific ST abnormalities but nothing to suggest acute ischemia and it was unchanged from previous back in 2017 [DP]   2246 Patient certainly has risk factors with his age and a heart score of 5.  There is an exertional component, but he also has been out working in the yard raking leaves without pain so it is a little inconsistent.    Patient wants to get his cochlear implant surgery done, so he will need cardiology clearance.  I have spoke with a nurse practitioner in the observation unit who agrees to admit him for further evaluation and cardiology, [DP]      ED Course User Index  [DP] Bhanu Goff MD           PPE: The patient wore a surgical mask throughout the entire patient encounter. I wore an N95.    AS OF 22:46 EST VITALS:    BP - 180/88  HR - 57  TEMP - 97.3 °F (36.3 °C) (Oral)  O2 SATS - 99%        DIAGNOSIS  Final diagnoses:   Chest pain in adult         DISPOSITION  Admit           Bhanu Goff MD  11/15/22 2246

## 2022-11-15 NOTE — ED TRIAGE NOTES
"Patient to ER via car from home for \"heartburn\" that is worse when he walks  X about 1 week  Patient wearing mask this RN in PPE  "

## 2022-11-15 NOTE — TELEPHONE ENCOUNTER
Caller: Jim Romero    Relationship to patient: Self    Best call back number: [phone]    Chief complaint: CHEST PAIN    Patient directed to call 911 or go to their nearest emergency room.     Patient verbalized understanding: [x] Yes  [] No  If no, why?    Additional notes: PATIENT STATES HE WAS EXPERIENCING CHEST PAIN. I HAVE ADVISED FOR HIM TO GO TO THE NEAREST EMERGENCY ROOM. HE STATES HE IS GOING TO GO TO [facility].

## 2022-11-15 NOTE — ED NOTES
.Nursing report ED to floor  Jim Romero  89 y.o.  male    HPI :   Chief Complaint   Patient presents with    Chest Pain       Admitting doctor:   Niko Gonzalez MD    Admitting diagnosis:   The encounter diagnosis was Chest pain in adult.    Code status:   Current Code Status       Date Active Code Status Order ID Comments User Context       11/15/2022 1754 CPR (Attempt to Resuscitate) [id number]  Lucia Torre PA ED        Question Answer    Code Status (Patient has no pulse and is not breathing) CPR (Attempt to Resuscitate)    Medical Interventions (Patient has pulse or is breathing) Full Support    Level Of Support Discussed With Patient                    Allergies:   Patient has no known allergies.    Isolation:   No active isolations    Intake and Output  No intake or output data in the 24 hours ending 11/15/22 1801    Weight:   There were no vitals filed for this visit.    Most recent vitals:   Vitals:    11/15/22 1515 11/15/22 1516   Pulse: 80    Resp: 18    Temp:  96.2 °F (35.7 °C)   SpO2: 96%        Active LDAs/IV Access:   Lines, Drains & Airways       Active LDAs       Name Placement date Placement time Site Days    Peripheral IV 11/15/22 1619 Anterior;Right Forearm 11/15/22  1619  Forearm  less than 1                    Labs (abnormal labs have a star):   Labs Reviewed   COMPREHENSIVE METABOLIC PANEL - Abnormal; Notable for the following components:       Result Value    Glucose 148 (*)     eGFR 59.5 (*)     All other components within normal limits    Narrative:     GFR Normal >60  Chronic Kidney Disease <60  Kidney Failure <15    The GFR formula is only valid for adults with stable renal function between ages 18 and 70.   CBC WITH AUTO DIFFERENTIAL - Abnormal; Notable for the following components:    Hemoglobin 12.3 (*)     Hematocrit 37.4 (*)     MCH 26.2 (*)     Lymphocyte % 19.5 (*)     All other components within normal limits   TROPONIN (IN-HOUSE) - Normal    Narrative:     Troponin T  Reference Range:  <= 0.03 ng/mL-   Negative for AMI  >0.03 ng/mL-     Abnormal for myocardial necrosis.  Clinicians would have to utilize clinical acumen, EKG, Troponin and serial changes to determine if it is an Acute Myocardial Infarction or myocardial injury due to an underlying chronic condition.       Results may be falsely decreased if patient taking Biotin.     TROPONIN (IN-HOUSE) - Normal    Narrative:     Troponin T Reference Range:  <= 0.03 ng/mL-   Negative for AMI  >0.03 ng/mL-     Abnormal for myocardial necrosis.  Clinicians would have to utilize clinical acumen, EKG, Troponin and serial changes to determine if it is an Acute Myocardial Infarction or myocardial injury due to an underlying chronic condition.       Results may be falsely decreased if patient taking Biotin.     RAINBOW DRAW    Narrative:     The following orders were created for panel order [place] Draw.  Procedure                               Abnormality         Status                     ---------                               -----------         ------                     Green Top (Gel)[[id number]]                                  Final result               Lavender Top[[id number]]                                     Final result               Gold Top - SST[[id number]]                                   Final result               Light Blue Top[[id number]]                                   Final result                 Please view results for these tests on the individual orders.   TROPONIN (IN-HOUSE)   CBC AND DIFFERENTIAL    Narrative:     The following orders were created for panel order CBC & Differential.  Procedure                               Abnormality         Status                     ---------                               -----------         ------                     CBC Auto Differential[[id number]]        Abnormal            Final result                 Please view results for these tests on the individual orders.   GREEN  TOP   LAVENDER TOP   GOLD TOP - SST   LIGHT BLUE TOP       EKG:   ECG 12 Lead Chest Pain   Final Result   HEART RATE= 63  bpm   RR Interval= 952  ms   ID Interval= 194  ms   P Horizontal Axis= 18  deg   P Front Axis= 49  deg   QRSD Interval= 116  ms   QT Interval= 413  ms   QRS Axis= 8  deg   T Wave Axis= 76  deg   - ABNORMAL ECG -   Sinus rhythm   Nonspecific intraventricular conduction delay   No change from previous tracing   Electronically Signed By: Froylan Booker ([facility]) 15-Nov-2022 17:27:37   Date and Time of Study: 2022-11-15 16:24:38      ECG 12 Lead Chest Pain   Final Result   HEART RATE= 68  bpm   RR Interval= 882  ms   ID Interval= 188  ms   P Horizontal Axis= -15  deg   P Front Axis= 43  deg   QRSD Interval= 111  ms   QT Interval= 411  ms   QRS Axis= -9  deg   T Wave Axis= 50  deg   - ABNORMAL ECG -   Sinus rhythm   Nonspecific repol abnormality, diffuse leads   No Prior Tracing for Comparison   Electronically Signed By: Demetria Rudolph ([facility]) 15-Nov-2022 16:05:04   Date and Time of Study: 2022-11-15 15:21:11          Meds given in ED:   Medications   sodium chloride 0.9 % flush 10 mL (has no administration in time range)   nitroglycerin (NITROSTAT) SL tablet 0.4 mg (has no administration in time range)   sodium chloride 0.9 % flush 10 mL (has no administration in time range)   sodium chloride 0.9 % flush 10 mL (has no administration in time range)   ondansetron (ZOFRAN) tablet 4 mg (has no administration in time range)     Or   ondansetron (ZOFRAN) injection 4 mg (has no administration in time range)   acetaminophen (TYLENOL) tablet 650 mg (has no administration in time range)   melatonin tablet 5 mg (has no administration in time range)   aspirin tablet 325 mg (325 mg Oral Given 11/15/22 1616)       Imaging results:  XR Chest 2 View    Result Date: 11/15/2022  No focal pulmonary consolidation. Minimal pleural effusion. Tortuous aorta. Follow-up as clinically indicated.  This report was finalized on  11/15/2022 3:54 PM by Dr. Morris Doss M.D.       Ambulatory status:   - ad nathan    Social issues:   Social History     Socioeconomic History    Marital status:    Tobacco Use    Smoking status: Former     Packs/day: 1.50     Types: Cigarettes     Start date:      Quit date:      Years since quittin.9    Smokeless tobacco: Never   Substance and Sexual Activity    Alcohol use: Yes     Comment: 1-2 x month    Drug use: No       NIH Stroke Scale:         Pat rAmenta RN  11/15/22 18:01 EST

## 2022-11-16 ENCOUNTER — APPOINTMENT (OUTPATIENT)
Dept: CARDIOLOGY | Facility: HOSPITAL | Age: 87
End: 2022-11-16

## 2022-11-16 ENCOUNTER — APPOINTMENT (OUTPATIENT)
Dept: NUCLEAR MEDICINE | Facility: HOSPITAL | Age: 87
End: 2022-11-16

## 2022-11-16 ENCOUNTER — READMISSION MANAGEMENT (OUTPATIENT)
Dept: CALL CENTER | Facility: HOSPITAL | Age: 87
End: 2022-11-16

## 2022-11-16 VITALS
WEIGHT: 168 LBS | HEIGHT: 72 IN | TEMPERATURE: 97.8 F | HEART RATE: 75 BPM | RESPIRATION RATE: 16 BRPM | SYSTOLIC BLOOD PRESSURE: 151 MMHG | DIASTOLIC BLOOD PRESSURE: 87 MMHG | OXYGEN SATURATION: 95 % | BODY MASS INDEX: 22.75 KG/M2

## 2022-11-16 LAB
ANION GAP SERPL CALCULATED.3IONS-SCNC: 9.4 MMOL/L (ref 5–15)
BH CV ECHO MEAS - ACS: 1.39 CM
BH CV ECHO MEAS - AI P1/2T: 595.4 MSEC
BH CV ECHO MEAS - AO MAX PG: 25 MMHG
BH CV ECHO MEAS - AO MEAN PG: 15 MMHG
BH CV ECHO MEAS - AO ROOT DIAM: 3.5 CM
BH CV ECHO MEAS - AO V2 VTI: 59.9 CM
BH CV ECHO MEAS - AVA(I,D): 1.16 CM2
BH CV ECHO MEAS - EDV(CUBED): 77.3 ML
BH CV ECHO MEAS - EDV(MOD-SP2): 60 ML
BH CV ECHO MEAS - EDV(MOD-SP4): 49 ML
BH CV ECHO MEAS - EF(MOD-BP): 61 %
BH CV ECHO MEAS - EF(MOD-SP2): 61.7 %
BH CV ECHO MEAS - EF(MOD-SP4): 61.2 %
BH CV ECHO MEAS - ESV(CUBED): 26.5 ML
BH CV ECHO MEAS - ESV(MOD-SP2): 23 ML
BH CV ECHO MEAS - ESV(MOD-SP4): 19 ML
BH CV ECHO MEAS - FS: 30 %
BH CV ECHO MEAS - IVS/LVPW: 1 CM
BH CV ECHO MEAS - IVSD: 1.61 CM
BH CV ECHO MEAS - LA DIMENSION: 2.8 CM
BH CV ECHO MEAS - LV DIASTOLIC VOL/BSA (35-75): 24.8 CM2
BH CV ECHO MEAS - LV MASS(C)D: 284.5 GRAMS
BH CV ECHO MEAS - LV MEAN PG: 1.49 MMHG
BH CV ECHO MEAS - LV SYSTOLIC VOL/BSA (12-30): 9.6 CM2
BH CV ECHO MEAS - LV V1 VTI: 18.2 CM
BH CV ECHO MEAS - LVIDD: 4.3 CM
BH CV ECHO MEAS - LVIDS: 3 CM
BH CV ECHO MEAS - LVOT AREA: 3.8 CM2
BH CV ECHO MEAS - LVOT DIAM: 2.2 CM
BH CV ECHO MEAS - LVPWD: 1.61 CM
BH CV ECHO MEAS - MR MAX PG: 58.1 MMHG
BH CV ECHO MEAS - MR MAX VEL: 381 CM/SEC
BH CV ECHO MEAS - MV A MAX VEL: 84.8 CM/SEC
BH CV ECHO MEAS - MV DEC SLOPE: 220.9 CM/SEC2
BH CV ECHO MEAS - MV DEC TIME: 261 MSEC
BH CV ECHO MEAS - MV E MAX VEL: 50.9 CM/SEC
BH CV ECHO MEAS - MV E/A: 0.6
BH CV ECHO MEAS - MV MEAN PG: 1.41 MMHG
BH CV ECHO MEAS - MV P1/2T: 89.6 MSEC
BH CV ECHO MEAS - MV V2 VTI: 25.2 CM
BH CV ECHO MEAS - MVA(P1/2T): 2.46 CM2
BH CV ECHO MEAS - MVA(VTI): 2.8 CM2
BH CV ECHO MEAS - PA ACC SLOPE: 630.8 CM/SEC2
BH CV ECHO MEAS - PA ACC TIME: 0.12 SEC
BH CV ECHO MEAS - PA PR(ACCEL): 25.1 MMHG
BH CV ECHO MEAS - PA V2 MAX: 129 CM/SEC
BH CV ECHO MEAS - PI END-D VEL: 108.6 CM/SEC
BH CV ECHO MEAS - QP/QS: 1.96
BH CV ECHO MEAS - RAP SYSTOLE: 3 MMHG
BH CV ECHO MEAS - RV MAX PG: 2.7 MMHG
BH CV ECHO MEAS - RV V1 MAX: 81.4 CM/SEC
BH CV ECHO MEAS - RV V1 VTI: 16.1 CM
BH CV ECHO MEAS - RVOT DIAM: 3.3 CM
BH CV ECHO MEAS - RVSP: 39 MMHG
BH CV ECHO MEAS - SI(MOD-SP2): 18.7 ML/M2
BH CV ECHO MEAS - SI(MOD-SP4): 15.2 ML/M2
BH CV ECHO MEAS - SV(LVOT): 69.4 ML
BH CV ECHO MEAS - SV(MOD-SP2): 37 ML
BH CV ECHO MEAS - SV(MOD-SP4): 30 ML
BH CV ECHO MEAS - SV(RVOT): 136.2 ML
BH CV ECHO MEAS - TAPSE (>1.6): 2.6 CM
BH CV ECHO MEAS - TR MAX PG: 36.2 MMHG
BH CV ECHO MEAS - TR MAX VEL: 301 CM/SEC
BH CV REST NUCLEAR ISOTOPE DOSE: 12 MCI
BH CV STRESS BP STAGE 1: NORMAL
BH CV STRESS DURATION MIN STAGE 1: 3
BH CV STRESS DURATION SEC STAGE 1: 0
BH CV STRESS GRADE STAGE 1: 0
BH CV STRESS HR STAGE 1: 109
BH CV STRESS METS STAGE 1: 5
BH CV STRESS NUCLEAR ISOTOPE DOSE: 36 MCI
BH CV STRESS PROTOCOL 1: NORMAL
BH CV STRESS PROTOCOL 2 COMMENTS STAGE 1: NORMAL
BH CV STRESS PROTOCOL 2 DOSE REGADENOSON STAGE 1: 0.4
BH CV STRESS PROTOCOL 2 DURATION MIN STAGE 1: 0
BH CV STRESS PROTOCOL 2 DURATION SEC STAGE 1: 10
BH CV STRESS PROTOCOL 2 STAGE 1: 1
BH CV STRESS PROTOCOL 2: NORMAL
BH CV STRESS RECOVERY BP: NORMAL MMHG
BH CV STRESS RECOVERY HR: 81 BPM
BH CV STRESS SPEED STAGE 1: 1.2
BH CV STRESS STAGE 1: 1
BH CV XLRA - RV BASE: 3.8 CM
BH CV XLRA - RV LENGTH: 6.5 CM
BH CV XLRA - RV MID: 2.9 CM
BH CV XLRA - TDI S': 18.5 CM/SEC
BUN SERPL-MCNC: 16 MG/DL (ref 8–23)
BUN/CREAT SERPL: 16.8 (ref 7–25)
CALCIUM SPEC-SCNC: 8.8 MG/DL (ref 8.2–9.6)
CHLORIDE SERPL-SCNC: 107 MMOL/L (ref 98–107)
CO2 SERPL-SCNC: 23.6 MMOL/L (ref 22–29)
CREAT SERPL-MCNC: 0.95 MG/DL (ref 0.76–1.27)
DEPRECATED RDW RBC AUTO: 42.1 FL (ref 37–54)
EGFRCR SERPLBLD CKD-EPI 2021: 75.6 ML/MIN/1.73
ERYTHROCYTE [DISTWIDTH] IN BLOOD BY AUTOMATED COUNT: 14.1 % (ref 12.3–15.4)
GLUCOSE SERPL-MCNC: 94 MG/DL (ref 65–99)
HCT VFR BLD AUTO: 33.8 % (ref 37.5–51)
HGB BLD-MCNC: 11 G/DL (ref 13–17.7)
LEFT ATRIUM VOLUME INDEX: 41.4 ML/M2
LV EF NUC BP: 65 %
MAXIMAL PREDICTED HEART RATE: 129 BPM
MAXIMAL PREDICTED HEART RATE: 129 BPM
MCH RBC QN AUTO: 26.9 PG (ref 26.6–33)
MCHC RBC AUTO-ENTMCNC: 32.5 G/DL (ref 31.5–35.7)
MCV RBC AUTO: 82.6 FL (ref 79–97)
PERCENT MAX PREDICTED HR: 86.82 %
PLATELET # BLD AUTO: 178 10*3/MM3 (ref 140–450)
PMV BLD AUTO: 11.3 FL (ref 6–12)
POTASSIUM SERPL-SCNC: 4.5 MMOL/L (ref 3.5–5.2)
QT INTERVAL: 406 MS
RBC # BLD AUTO: 4.09 10*6/MM3 (ref 4.14–5.8)
SODIUM SERPL-SCNC: 140 MMOL/L (ref 136–145)
STRESS BASELINE BP: NORMAL MMHG
STRESS BASELINE HR: 64 BPM
STRESS PERCENT HR: 102 %
STRESS POST ESTIMATED WORKLOAD: 1.9 METS
STRESS POST EXERCISE DUR MIN: 3 MIN
STRESS POST EXERCISE DUR SEC: 0 SEC
STRESS POST PEAK BP: NORMAL MMHG
STRESS POST PEAK HR: 112 BPM
STRESS TARGET HR: 110 BPM
STRESS TARGET HR: 110 BPM
TROPONIN T SERPL-MCNC: 0.01 NG/ML (ref 0–0.03)
WBC NRBC COR # BLD: 7.17 10*3/MM3 (ref 3.4–10.8)

## 2022-11-16 PROCEDURE — 93010 ELECTROCARDIOGRAM REPORT: CPT | Performed by: INTERNAL MEDICINE

## 2022-11-16 PROCEDURE — 80048 BASIC METABOLIC PNL TOTAL CA: CPT | Performed by: PHYSICIAN ASSISTANT

## 2022-11-16 PROCEDURE — 85027 COMPLETE CBC AUTOMATED: CPT | Performed by: PHYSICIAN ASSISTANT

## 2022-11-16 PROCEDURE — 93306 TTE W/DOPPLER COMPLETE: CPT

## 2022-11-16 PROCEDURE — 93005 ELECTROCARDIOGRAM TRACING: CPT | Performed by: STUDENT IN AN ORGANIZED HEALTH CARE EDUCATION/TRAINING PROGRAM

## 2022-11-16 PROCEDURE — 93016 CV STRESS TEST SUPVJ ONLY: CPT | Performed by: INTERNAL MEDICINE

## 2022-11-16 PROCEDURE — 0 TECHNETIUM SESTAMIBI: Performed by: EMERGENCY MEDICINE

## 2022-11-16 PROCEDURE — G0378 HOSPITAL OBSERVATION PER HR: HCPCS

## 2022-11-16 PROCEDURE — 78452 HT MUSCLE IMAGE SPECT MULT: CPT

## 2022-11-16 PROCEDURE — A9500 TC99M SESTAMIBI: HCPCS | Performed by: EMERGENCY MEDICINE

## 2022-11-16 PROCEDURE — 93017 CV STRESS TEST TRACING ONLY: CPT

## 2022-11-16 PROCEDURE — 93306 TTE W/DOPPLER COMPLETE: CPT | Performed by: INTERNAL MEDICINE

## 2022-11-16 PROCEDURE — 78452 HT MUSCLE IMAGE SPECT MULT: CPT | Performed by: INTERNAL MEDICINE

## 2022-11-16 PROCEDURE — 99203 OFFICE O/P NEW LOW 30 MIN: CPT | Performed by: NURSE PRACTITIONER

## 2022-11-16 PROCEDURE — 25010000002 REGADENOSON 0.4 MG/5ML SOLUTION: Performed by: EMERGENCY MEDICINE

## 2022-11-16 PROCEDURE — 93018 CV STRESS TEST I&R ONLY: CPT | Performed by: INTERNAL MEDICINE

## 2022-11-16 PROCEDURE — 84484 ASSAY OF TROPONIN QUANT: CPT | Performed by: STUDENT IN AN ORGANIZED HEALTH CARE EDUCATION/TRAINING PROGRAM

## 2022-11-16 RX ORDER — ASPIRIN 81 MG/1
81 TABLET ORAL DAILY
Qty: 30 TABLET | Refills: 0 | Status: SHIPPED | OUTPATIENT
Start: 2022-11-17 | End: 2022-12-17

## 2022-11-16 RX ORDER — ASPIRIN 81 MG/1
81 TABLET ORAL DAILY
Status: DISCONTINUED | OUTPATIENT
Start: 2022-11-16 | End: 2022-11-16 | Stop reason: HOSPADM

## 2022-11-16 RX ORDER — ASPIRIN 81 MG/1
81 TABLET ORAL DAILY
Qty: 90 TABLET | Refills: 1 | Status: CANCELLED | OUTPATIENT
Start: 2022-11-16

## 2022-11-16 RX ORDER — METOPROLOL SUCCINATE 25 MG/1
25 TABLET, EXTENDED RELEASE ORAL
Status: DISCONTINUED | OUTPATIENT
Start: 2022-11-16 | End: 2022-11-16 | Stop reason: HOSPADM

## 2022-11-16 RX ORDER — METOPROLOL SUCCINATE 25 MG/1
25 TABLET, EXTENDED RELEASE ORAL
Qty: 30 TABLET | Refills: 0 | Status: SHIPPED | OUTPATIENT
Start: 2022-11-17 | End: 2023-01-09 | Stop reason: SDUPTHER

## 2022-11-16 RX ORDER — METOPROLOL SUCCINATE 25 MG/1
25 TABLET, EXTENDED RELEASE ORAL
Qty: 90 TABLET | Refills: 1 | Status: CANCELLED | OUTPATIENT
Start: 2022-11-16

## 2022-11-16 RX ORDER — ISOSORBIDE MONONITRATE 30 MG/1
30 TABLET, EXTENDED RELEASE ORAL
Qty: 90 TABLET | Refills: 1 | Status: CANCELLED | OUTPATIENT
Start: 2022-11-16

## 2022-11-16 RX ORDER — ISOSORBIDE MONONITRATE 30 MG/1
30 TABLET, EXTENDED RELEASE ORAL
Qty: 30 TABLET | Refills: 0 | Status: SHIPPED | OUTPATIENT
Start: 2022-11-17 | End: 2022-11-29

## 2022-11-16 RX ORDER — ISOSORBIDE MONONITRATE 30 MG/1
30 TABLET, EXTENDED RELEASE ORAL
Status: DISCONTINUED | OUTPATIENT
Start: 2022-11-16 | End: 2022-11-16 | Stop reason: HOSPADM

## 2022-11-16 RX ADMIN — METOPROLOL SUCCINATE 25 MG: 25 TABLET, EXTENDED RELEASE ORAL at 14:07

## 2022-11-16 RX ADMIN — TECHNETIUM TC 99M SESTAMIBI 1 DOSE: 1 INJECTION INTRAVENOUS at 08:47

## 2022-11-16 RX ADMIN — ISOSORBIDE MONONITRATE 30 MG: 30 TABLET, EXTENDED RELEASE ORAL at 14:07

## 2022-11-16 RX ADMIN — REGADENOSON 0.4 MG: 0.08 INJECTION, SOLUTION INTRAVENOUS at 10:28

## 2022-11-16 RX ADMIN — ACETAMINOPHEN 650 MG: 325 TABLET, FILM COATED ORAL at 04:37

## 2022-11-16 RX ADMIN — ASPIRIN 81 MG: 81 TABLET, COATED ORAL at 14:07

## 2022-11-16 RX ADMIN — PANTOPRAZOLE SODIUM 40 MG: 40 TABLET, DELAYED RELEASE ORAL at 08:31

## 2022-11-16 RX ADMIN — TAMSULOSIN HYDROCHLORIDE 0.4 MG: 0.4 CAPSULE ORAL at 08:31

## 2022-11-16 RX ADMIN — Medication 10 ML: at 08:31

## 2022-11-16 RX ADMIN — TECHNETIUM TC 99M SESTAMIBI 1 DOSE: 1 INJECTION INTRAVENOUS at 10:27

## 2022-11-16 NOTE — DISCHARGE INSTRUCTIONS
Begin aspirin 81 mg daily, isosorbide 30 mg daily, and metoprolol 25 mg daily.  When you have rescheduled cochlear implant surgery, stop aspirin 5 days prior to surgery as previously instructed.  Resume aspirin after surgery.  Follow-up with cardiology in 2 months.  Follow-up with your primary care provider.  Return to the emergency department with worsening symptoms, uncontrolled pain, inability to tolerate oral liquids, fever greater than 101°F not controlled by Tylenol or as needed with emergent concerns.

## 2022-11-16 NOTE — CONSULTS
Cardiology Hospital Consult    Patient Name: Jim Romero  Age/Sex: 89 y.o. male  : 3/17/1931  MRN: [id number]    Date of Admission: 11/15/2022  Date of Encounter Visit: 22  Encounter Provider: LONNY Cody  Referring Provider: Niko Gonzalez MD  Place of Service: [facility]  Patient Care Team:  Reji Gilman MD as PCP - General (Internal Medicine)  Reji Gilman MD as PCP - Internal Medicine (Internal Medicine)    Subjective:     Consulted for:  Chest pain    Chief Complaint: Chest discomfort    History of Present Illness:  Jim Romero is a 89 y.o. male presented to the emergency department yesterday after being referred by the preoperative team, as patient was scheduled to have cochlear implants.  He reports some intermittent lower chest/epigastric discomfort that radiates down left arm over the last 2 weeks.  He notices this discomfort when he walks about a block.  He states that he was raking leaves in the yard and he did not notice that feeling.  It is relieved by rest and he thinks that Tums also helped.  Patient denies any associated symptoms.  He has not had any shortness of breath or lower extremity edema.  Patient does note that a couple weeks ago, his wife called EMS after he had an episode of weakness and dizziness.  He states that EMS wanted to take him to the hospital, but he declined.  He denied any chest discomfort at that time.    Patient has no past cardiac history.  Patient notes that each year he had to have a stress test at  as part of his annual physical.  He states that they would always stop the treadmill and tell him that he was doing terrible.  He had a normal stress echo in May 2017 which showed normal left ventricular systolic function and mild aortic insufficiency.  Lipid panel in target range.  He has diabetes with latest HbA1c 6.10.  Patient also has a prevalent murmur.  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    ED workup shows troponin negative x 4.  EKG shows no evidence of ischemia.  EKG last night similar to EKG in 2017.  CXR no cardiomegaly.   Patient has had no further chest discomfort.    Past Medical History:  Past Medical History:   Diagnosis Date   • Benign prostatic hyperplasia    • GERD (gastroesophageal reflux disease)    • Medication management    • RLS (restless legs syndrome)    • Sciatica        History reviewed. No pertinent surgical history.    Home Medications:   Medications Prior to Admission   Medication Sig Dispense Refill Last Dose   • calcium carbonate-cholecalciferol 500-400 MG-UNIT tablet tablet Take 1 tablet by mouth Daily.   2022   • pantoprazole (PROTONIX) 40 MG EC tablet TAKE ONE TABLET BY MOUTH DAILY 90 tablet 2 2022   • pramipexole (MIRAPEX) 1.5 MG tablet TAKE ONE TABLET BY MOUTH ONCE NIGHTLY 90 tablet 1 2022   • tamsulosin (FLOMAX) 0.4 MG capsule 24 hr capsule TAKE ONE CAPSULE BY MOUTH TWICE A DAY (Patient taking differently: 2 capsules Daily.) 180 capsule 1 2022       Allergies:  No Known Allergies    Past Social History:  Social History     Socioeconomic History   • Marital status:    Tobacco Use   • Smoking status: Former     Packs/day: 1.50     Types: Cigarettes     Start date:      Quit date:      Years since quittin.9   • Smokeless tobacco: Never   Substance and Sexual Activity   • Alcohol use: Yes     Comment: 1-2 x month   • Drug use: No       Past Family History:  Family History   Problem Relation Age of Onset   • Melanoma Mother 77       Review of Systems:     CONSTITUTIONAL: No weight loss, fever, chills, weakness or fatigue.   HEENT: Eyes: No visual loss, blurred vision, double vision or yellow sclerae. Ears, Nose, Throat: No hearing loss, sneezing, congestion, runny nose or sore throat.   SKIN: No rash or itching.     RESPIRATORY: No shortness of breath, hemoptysis, cough or sputum.   GASTROINTESTINAL: No anorexia, nausea,  vomiting or diarrhea. No abdominal pain, bright red blood per rectum or melena.  GENITOURINARY: No burning on urination, hematuria or increased frequency.  NEUROLOGICAL: No headache, dizziness, syncope, paralysis, ataxia, numbness or tingling in the extremities. No change in bowel or bladder control.   MUSCULOSKELETAL: No muscle, back pain, joint pain or stiffness.   HEMATOLOGIC: No anemia, bleeding or bruising.   LYMPHATICS: No enlarged nodes. No history of splenectomy.   PSYCHIATRIC: No history of depression, anxiety, hallucinations.   ENDOCRINOLOGIC: No reports of sweating, cold or heat intolerance. No polyuria or polydipsia.     Objective:   Temp:  [96.2 °F (35.7 °C)-98.4 °F (36.9 °C)] 98.4 °F (36.9 °C)  Heart Rate:  [57-80] 69  Resp:  [18] 18  BP: (132-180)/(74-91) 132/78   No intake or output data in the 24 hours ending 11/16/22 0755  Body mass index is 22.78 kg/m².      11/15/22  1746   Weight: 76.2 kg (168 lb)     Weight change:     Physical Exam:   General Appearance:    Alert, cooperative, in no acute distress   Head:    Normocephalic, without obvious abnormality, atraumatic   Eyes:            Conjunctivae and sclerae normal, no   icterus, no pallor, corneas clear, PERRLA   Neck:   No adenopathy, supple, trachea midline, no thyromegaly, no   carotid bruit, no JVD   Lungs:     Clear to auscultation,respirations regular, even and unlabored    Heart:    Regular rhythm and normal rate, normal S1 and S2, + systolic murmur, no gallop, no rub, no click   Chest Wall:    No abnormalities observed   Abdomen:     Normal bowel sounds, no masses, no organomegaly, soft, nontender, nondistended, no guarding, no rebound  tenderness   Extremities:   Moves all extremities well, no edema, no cyanosis, no redness   Pulses:   Pulses palpable and equal bilaterally.      Lab Review:   Results from last 7 days   Lab Units 11/16/22  0421 11/15/22  1537   SODIUM mmol/L 140 140   POTASSIUM mmol/L 4.5 4.1   CHLORIDE mmol/L 107 102    CO2 mmol/L 23.6 26.8   BUN mg/dL 16 17   CREATININE mg/dL 0.95 1.16   GLUCOSE mg/dL 94 148*   CALCIUM mg/dL 8.8 9.2   AST (SGOT) U/L  --  15   ALT (SGPT) U/L  --  10     Results from last 7 days   Lab Units 22  0421 11/15/22  2255 11/15/22  1704 11/15/22  1537   TROPONIN T ng/mL 0.013 0.019 0.020 0.019     Results from last 7 days   Lab Units 22  0421 11/15/22  1537   WBC 10*3/mm3 7.17 6.78   HEMOGLOBIN g/dL 11.0* 12.3*   HEMATOCRIT % 33.8* 37.4*   PLATELETS 10*3/mm3 178 206       Echo EF Estimated  Lab Results   Component Value Date    ECHOEFEST 60 2017   • Left ventricular systolic function is normal. Estimated EF = 60%.  • Mild aortic valve regurgitation is present.  • Mild tricuspid valve regurgitation is present.  • Estimated right ventricular systolic pressure from tricuspid regurgitation is normal (<35 mmHg).  • Normal stress echo with no significant echocardiographic evidence for myocardial ischemia      EK2017:      11/15/2022:      Imaging:  Imaging Results (Most Recent)     Procedure Component Value Units Date/Time    XR Chest 2 View [[id number]] Collected: 11/15/22 1553     Updated: 11/15/22 1557    Narrative:      XR CHEST 2 VW-     HISTORY: Male who is 89 years-old,  chest pain     TECHNIQUE: Frontal and lateral views of the chest     COMPARISON: None available     FINDINGS: The heart size is normal. Aorta is tortuous, calcified.  Pulmonary vasculature is unremarkable. No focal pulmonary consolidation.  Minimal pleural effusion is suggested on the lateral view. No  pneumothorax. No acute osseous process.       Impression:      No focal pulmonary consolidation. Minimal pleural effusion.  Tortuous aorta. Follow-up as clinically indicated.     This report was finalized on 11/15/2022 3:54 PM by Dr. Morris Doss M.D.               Assessment:       Chest pain        Plan:     1.  Chest pain: Exertional.  Relieved with rest.  EKG no ischemic changes.  Troponin negative.   Patient with previous episode of weakness and dizziness.  Walking Lexiscan ordered.   2.  Murmur: Echo in 2017 noted mild aortic insufficiency.  Patient notes that he was born with murmur.  Unable to determine whether murmur has worsened.  2D echocardiogram ordered  3.  Hypertension: Appears hypertensive on monitor since arrival.  Will start low-dose metoprolol succinate.  Patient heart rate in the 50s to upper 60      Addendum 1339:  Discussed with Dr. Booker.  Stress test is intermediate risk.  Medical management with Toprol XL 25mg Daily, Imdur 30mg daily and aspirin 81 mg daily at this time.  Patient has no ischemic changes on his EKG and normal troponin levels.  He has had any further chest pain since admission to the hospital.  Echocardiogram shows normal LV function, as well as, mild aortic insufficiency, mild aortic stenosis and mild mitral regurgitation.  We will have patient follow-up with Dr. Smith or myself in office in about 2 to 3 months.    Thank you for allowing me to participate in the care of Jim Romero. Feel free to contact me directly with any further questions or concerns.    LONNY Cody  [facility]  11/16/22  07:55 EST    EMR Dragon/Transcription disclaimer:   Much of this encounter note is an electronic transcription/translation of spoken language to printed text. The electronic translation of spoken language may permit erroneous, or at times, nonsensical words or phrases to be inadvertently transcribed; Although I have reviewed the note for such errors, some may still exist.

## 2022-11-16 NOTE — PROGRESS NOTES
ED OBSERVATION PROGRESS/DISCHARGE SUMMARY    Date of Admission: 11/15/2022   LOS: 0 days   PCP: Reji Gilman MD    Subjective  No acute events overnight    Hospital Outcome: 89-year-old male admitted to the observation unit for further evaluation of chest pain.  Patient was seen by cardiology this morning and they plan to do echocardiogram and walking Lexiscan stress test.    Stress test consistent with intermediate risk.  Discussed with cardiology, patient will be started on aspirin 81 mg daily, metoprolol 25 mg daily, and Imdur 30 mg daily.  Patient had to cancel cochlear implant surgery yesterday and this is yet to be rescheduled.  Patient educated that once cochlear implant surgery is rescheduled, he can stop aspirin 5 days out from surgery as previously instructed.  He will resume aspirin after surgery.  Patient will follow-up with cardiology in 2 to 3 months.  Usual return to ER precautions discussed with patient who expresses understanding and is in agreement with plan.    ROS:  General: no fevers, chills  Respiratory: no cough, dyspnea  Cardiovascular: no chest pain, palpitations  Abdomen: No abdominal pain, nausea, vomiting, or diarrhea  Neurologic: No focal weakness    Objective   Physical Exam:  I have reviewed the vital signs.  Temp:  [96.2 °F (35.7 °C)-98.4 °F (36.9 °C)] 97.8 °F (36.6 °C)  Heart Rate:  [57-80] 62  Resp:  [16-18] 16  BP: (132-180)/(74-91) 158/84  General Appearance:    Alert, cooperative, no distress  Head:    Normocephalic, atraumatic  Eyes:    Sclerae anicteric  Neck:   Supple, no mass  Lungs: Clear to auscultation bilaterally, respirations unlabored  Heart: Regular rate and rhythm, S1 and S2 normal, no murmur, rub or gallop  Abdomen:  Soft, non-tender, bowel sounds active, nondistended  Extremities: No clubbing, cyanosis, or edema to lower extremities  Pulses:  2+ and symmetric in distal lower extremities  Skin: No rashes   Neurologic: Oriented x3, Normal strength to  extremities    Results Review:    I have reviewed the labs, radiology results and diagnostic studies.    Results from last 7 days   Lab Units 11/16/22  0421   WBC 10*3/mm3 7.17   HEMOGLOBIN g/dL 11.0*   HEMATOCRIT % 33.8*   PLATELETS 10*3/mm3 178     Results from last 7 days   Lab Units 11/16/22  0421 11/15/22  1537   SODIUM mmol/L 140 140   POTASSIUM mmol/L 4.5 4.1   CHLORIDE mmol/L 107 102   CO2 mmol/L 23.6 26.8   BUN mg/dL 16 17   CREATININE mg/dL 0.95 1.16   CALCIUM mg/dL 8.8 9.2   BILIRUBIN mg/dL  --  0.5   ALK PHOS U/L  --  63   ALT (SGPT) U/L  --  10   AST (SGOT) U/L  --  15   GLUCOSE mg/dL 94 148*     Imaging Results (Last 24 Hours)     Procedure Component Value Units Date/Time    XR Chest 2 View [[id number]] Collected: 11/15/22 1553     Updated: 11/15/22 1557    Narrative:      XR CHEST 2 VW-     HISTORY: Male who is 89 years-old,  chest pain     TECHNIQUE: Frontal and lateral views of the chest     COMPARISON: None available     FINDINGS: The heart size is normal. Aorta is tortuous, calcified.  Pulmonary vasculature is unremarkable. No focal pulmonary consolidation.  Minimal pleural effusion is suggested on the lateral view. No  pneumothorax. No acute osseous process.       Impression:      No focal pulmonary consolidation. Minimal pleural effusion.  Tortuous aorta. Follow-up as clinically indicated.     This report was finalized on 11/15/2022 3:54 PM by Dr. Morris Doss M.D.             I have reviewed the medications.  ---------------------------------------------------------------------------------------------  Assessment & Plan   Assessment/Problem List    Chest pain      Plan:    Chest pain  -Troponin 0.019, 0.020, trend  -EKG with no acute ischemic changes  -Chest x-ray shows no focal pulmonary consolidation with suggestion of a minimal pleural effusion and a note of a tortuous aorta.  -Cardiology consulted  -Aspirin given in ER  -Nitro as needed for chest pain  -Telemetry monitoring  -N.p.o.  after midnight  -Stress test consistent with intermediate risk  -Home on aspirin 81 mg daily, metoprolol 25 mg daily, and Imdur 30 mg daily  -Follow-up with cardiology in 2 to 3 months    Hard of hearing  -Patient was previously instructed to stop aspirin 5 days prior to surgery  -Patient to reschedule surgery and will stop aspirin 5 days prior to surgery  -Resume aspirin after surgery     GERD  -PPI     Hyperlipidemia  -Lipid panel 10/20/2022,  otherwise unremarkable    Disposition: Home    Follow-up after Discharge: PCP, cardiology    This note will serve as discharge summary    JOANNE Limon 11/16/22 08:49 EST

## 2022-11-16 NOTE — PROGRESS NOTES
MD ATTESTATION NOTE    The JAREN and I have discussed this patient's history, physical exam, and treatment plan.  I have reviewed the documentation and personally had a face to face interaction with the patient. I affirm the documentation and agree with the treatment and plan.  The attached note describes my personal findings.      I provided a substantive portion of the care of the patient.  I personally performed the physical exam in its entirety, and below are my findings.  For this patient encounter, the patient wore surgical mask, I wore full protective PPE including N95 and eye protection.      Brief HPI: This patient is a 89-year-old male admitted to the observation unit today secondary to lower chest discomfort radiating into his left arm.  He denies any current shortness of breath, nausea/vomiting, or diaphoresis.  Currently, he reports that his discomfort has resolved.    PHYSICAL EXAM  ED Triage Vitals   Temp Heart Rate Resp BP SpO2   11/15/22 1516 11/15/22 1515 11/15/22 1515 11/15/22 1746 11/15/22 1515   96.2 °F (35.7 °C) 80 18 161/91 96 %      Temp src Heart Rate Source Patient Position BP Location FiO2 (%)   11/15/22 1845 11/15/22 1746 11/15/22 1746 11/15/22 1746 --   Oral Monitor Lying Left arm          GENERAL: Resting comfortably and in no acute distress, nontoxic in  HENT: nares patent  EYES: no scleral icterus  CV: regular rhythm, normal rate, no M/R/G  RESPIRATORY: normal effort, lungs clear bilateral  ABDOMEN: soft, nontender, no palpable mass  MUSCULOSKELETAL: no deformity, no edema  NEURO: alert, moves all extremities, follows commands  PSYCH:  calm, cooperative  SKIN: warm, dry    Vital signs and nursing notes reviewed.        Plan: The patient's EKG and cardiac biomarkers are nonischemic.  We will ask cardiology to see in consultation and he will undergo stress test with echocardiogram this a.m.  We will monitor and reassess following.

## 2022-11-16 NOTE — OUTREACH NOTE
Prep Survey    Flowsheet Row Responses   [facility] patient discharged from? [place]   Is LACE score < 7 ? Yes   Emergency Room discharge w/ pulse ox? No   Eligibility [facility]   Date of Admission 11/15/22   Date of Discharge 11/16/22   Discharge Disposition Home or Self Care   Discharge diagnosis chest pain   Does the patient have one of the following disease processes/diagnoses(primary or secondary)? Other   Does the patient have Home health ordered? No   Is there a DME ordered? No   General alerts for this patient hard of hearing   Prep survey completed? Yes          PREETI ROJAS - Registered Nurse

## 2022-11-16 NOTE — CASE MANAGEMENT/SOCIAL WORK
Discharge Planning Assessment  [facility]     Patient Name: Jim Romero  MRN: [id number]  Today's Date: 11/16/2022    Admit Date: 11/15/2022    Plan: Plans to return home at d/c and will drive self-REBECA Sutherland MSN, RN   Discharge Needs Assessment     Row Name 11/16/22 1136       Living Environment    People in Home spouse;child(natalia), adult;grandparent(s)    Name(s) of People in Home wife Niki, son Zach and grandson, 12 yo    Current Living Arrangements home    Primary Care Provided by self    Provides Primary Care For no one    Family Caregiver if Needed spouse    Family Caregiver Names Prince    Quality of Family Relationships supportive    Able to Return to Prior Arrangements yes       Resource/Environmental Concerns    Resource/Environmental Concerns none       Transition Planning    Patient/Family Anticipates Transition to home with family    Patient/Family Anticipated Services at Transition none    Transportation Anticipated car, drives self       Discharge Needs Assessment    Equipment Currently Used at Home none    Concerns to be Addressed no discharge needs identified    Anticipated Changes Related to Illness none    Equipment Needed After Discharge none    Provided Post Acute Provider List? N/A    Provided Post Acute Provider Quality & Resource List? N/A               Discharge Plan     Row Name 11/16/22 7608       Plan    Plan Plans to return home at d/c and will drive self-REBECA Sutherland MSN, RN    Provided Post Acute Provider List? N/A    Provided Post Acute Provider Quality & Resource List? N/A    Plan Comments Introduced self and explained role of CCP. Info on facesheet verified. PPE used. Lives at home w/ wife Niki, vinny and 12 yo grandson and plans to return at d/c. Will drive self home. Independent w/ ADLs. No assisitve devices used. Denies any d/c needs or diffculty paying for meds-REBECA Sutherland MSN, RN              Continued Care and Services - Admitted Since 11/15/2022    Coordination has not been  started for this encounter.          Demographic Summary     Row Name 11/16/22 1135       General Information    Admission Type observation    Arrived From emergency department    Required Notices Provided Observation Status Notice    Referral Source admission list    Reason for Consult discharge planning    Preferred Language English               Functional Status     Row Name 11/16/22 1135       Functional Status    Usual Activity Tolerance good    Current Activity Tolerance good       Functional Status, IADL    Medications independent       Mental Status    General Appearance WDL WDL       Mental Status Summary    Recent Changes in Mental Status/Cognitive Functioning no changes               Psychosocial     Row Name 11/16/22 1135       Behavior WDL    Behavior WDL WDL       Emotion Mood WDL    Emotion/Mood/Affect WDL WDL       Speech WDL    Speech WDL WDL       Perceptual State WDL    Perceptual State WDL WDL       Thought Process WDL    Thought Process WDL WDL       Intellectual Performance WDL    Intellectual Performance WDL WDL               Abuse/Neglect    No documentation.                Legal    No documentation.                Substance Abuse    No documentation.                Patient Forms    No documentation.                   Jaycee Sutherland RN

## 2022-11-16 NOTE — NURSING NOTE
Pt admitted for further evaluation of chest pain that he described as mild heart burn. Pt hasn't complained of any chest pain throughout the shift. Vitals have been stable, normal sinus to sinus sruthi on the monitor. NPO for Cardiology consult today. A&OX4

## 2022-11-16 NOTE — H&P
[facility]   HISTORY AND PHYSICAL    Patient Name: Jim Romero  : 3/17/1931  MRN: [id number]  Primary Care Physician:  Reji Gilman MD  Date of admission: 11/15/2022    Subjective   Subjective     Chief Complaint:   Chief Complaint   Patient presents with   • Chest Pain         HPI:    Jim Romero is a 89 y.o. male with a history of GERD and BPH who presents to [facility] ER with chest pain.  Patient reports he went to have cochlear surgery today was for referred to the ER by the preoperative team due to the symptoms he was describing.  Patient states for the last week he has noticed when he was walking around the neighborhood that he was having a chest burning and pressure.  He states that it was relieved with rest.  He denies any associated lightheadedness, shortness of breath, nausea, or episodes of sweating.  He states that he only experiences this when he is going on his walks.  He states he works out in his garden and does not have the same sensation.  He reports he took a Mylanta but did not feel that it gave him much relief.  He reports he has a history of smoking but does not currently smoke.  Reports no history of heart disease.  He denies any recent fevers or chills.  Denies abdominal pain and diarrhea.  Denies cough, congestion, or sore throat.    ER evaluation significant for chest x-ray revealing no focal pulmonary consolidation with a minimal pleural effusion and tortuous aorta.  Initial troponin was negative and EKG noted with no acute ischemic changes.  Patient was given aspirin in the ER.    Review of Systems   All systems were reviewed and negative except for: what is mentioned above in the HPI.     Personal History     Past Medical History:   Diagnosis Date   • Benign prostatic hyperplasia    • GERD (gastroesophageal reflux disease)    • Medication management    • RLS (restless legs syndrome)    • Sciatica        History reviewed. No pertinent surgical  history.    Family History: family history includes Melanoma (age of onset: 77) in his mother. Otherwise pertinent FHx was reviewed and not pertinent to current issue.    Social History:  reports that he quit smoking about 65 years ago. His smoking use included cigarettes. He started smoking about 73 years ago. He smoked an average of 1.5 packs per day. He has never used smokeless tobacco. He reports current alcohol use. He reports that he does not use drugs.    Home Medications:  calcium carbonate-cholecalciferol, pantoprazole, pramipexole, and tamsulosin    Allergies:  No Known Allergies    Objective   Objective     Vitals:   Temp:  [96.2 °F (35.7 °C)-97.3 °F (36.3 °C)] 97.3 °F (36.3 °C)  Heart Rate:  [57-80] 57  Resp:  [18] 18  BP: (161-180)/(88-91) 180/88  Physical Exam    Constitutional: 89-year-old male, well-nourished, no acute distress on room air   Eyes: PERRLA, sclerae anicteric, no conjunctival injection   HENT: NCAT, mucous membranes moist   Neck: Supple, no thyromegaly, no lymphadenopathy, trachea midline   Respiratory: Clear to auscultation bilaterally, nonlabored respirations    Cardiovascular: RRR, no murmurs, rubs, or gallops, palpable pedal pulses bilaterally, no chest wall tenderness   Gastrointestinal: Positive bowel sounds, soft, nontender, nondistended   Musculoskeletal: No bilateral ankle edema, no clubbing or cyanosis to extremities   Psychiatric: Appropriate affect, cooperative   Neurologic: Oriented x 3, strength symmetric in all extremities, Cranial Nerves grossly intact to confrontation, speech clear   Skin: No rashes     Result Review    Result Review:  I have personally reviewed the results from the time of this admission to 11/15/2022 19:23 EST and agree with these findings:  [x]  Laboratory list / accordion  []  Microbiology  [x]  Radiology  [x]  EKG/Telemetry   []  Cardiology/Vascular   []  Pathology  []  Old records  []  Other:  Most notable findings include:     XR Chest 2  View    Result Date: 11/15/2022  XR CHEST 2 VW-  HISTORY: Male who is 89 years-old,  chest pain  TECHNIQUE: Frontal and lateral views of the chest  COMPARISON: None available  FINDINGS: The heart size is normal. Aorta is tortuous, calcified. Pulmonary vasculature is unremarkable. No focal pulmonary consolidation. Minimal pleural effusion is suggested on the lateral view. No pneumothorax. No acute osseous process.      No focal pulmonary consolidation. Minimal pleural effusion. Tortuous aorta. Follow-up as clinically indicated.  This report was finalized on 11/15/2022 3:54 PM by Dr. Morris Doss M.D.          Assessment & Plan   Assessment / Plan     Brief Patient Summary:  Jim Romero is a 89 y.o. male who is being admitted to the observation unit for further evaluation of chest pain with plan for continued monitoring and cardiology consultation.    Active Hospital Problems:  Active Hospital Problems    Diagnosis    • **Chest pain      Plan:     Chest pain  -Troponin 0.019, 0.020, trend  -EKG with no acute ischemic changes  -Chest x-ray shows no focal pulmonary consolidation with suggestion of a minimal pleural effusion and a note of a tortuous aorta.  -Cardiology consulted  -Aspirin given in ER  -Nitro as needed for chest pain  -Telemetry monitoring  -N.p.o. after midnight    GERD  -PPI    Hyperlipidemia  -Lipid panel 10/20/2022,  otherwise unremarkable    DVT prophylaxis:  Mechanical DVT prophylaxis orders are present.    CODE STATUS:    Level Of Support Discussed With: Patient  Code Status (Patient has no pulse and is not breathing): CPR (Attempt to Resuscitate)  Medical Interventions (Patient has pulse or is breathing): Full Support    Admission Status:  I believe this patient meets observation status.    I wore an face mask, eye protection, and gloves during this patient encounter. Patient also wearing a surgical mask. Hand hygeine performed before and after seeing the patient.    Electronically  signed by Katy Luther PA-C, 11/15/22, 7:23 PM EST.

## 2022-11-17 ENCOUNTER — TELEPHONE (OUTPATIENT)
Dept: INTERNAL MEDICINE | Facility: CLINIC | Age: 87
End: 2022-11-17

## 2022-11-17 ENCOUNTER — TRANSITIONAL CARE MANAGEMENT TELEPHONE ENCOUNTER (OUTPATIENT)
Dept: CALL CENTER | Facility: HOSPITAL | Age: 87
End: 2022-11-17

## 2022-11-17 ENCOUNTER — TELEPHONE (OUTPATIENT)
Dept: CARDIOLOGY | Facility: CLINIC | Age: 87
End: 2022-11-17

## 2022-11-17 NOTE — OUTREACH NOTE
Call Center TCM Note    Flowsheet Row Responses   [facility] patient discharged from? [place]   Does the patient have one of the following disease processes/diagnoses(primary or secondary)? Other   TCM attempt successful? Yes   Call start time 1148   Call end time 1153   General alerts for this patient hard of hearing   Discharge diagnosis chest pain   Is patient permission given to speak with other caregiver? Yes   List who call center can speak with spouse- May   Person spoke with today (if not patient) and relationship spouse   Does the patient have all medications ordered at discharge? Yes   Is the patient taking all medications as directed (includes completed medication regime)? Yes   Comments hospital f/u with PCP on 11/29   Does the patient have an appointment with their PCP within 7 days of discharge? Yes   Has home health visited the patient within 72 hours of discharge? N/A   Psychosocial issues? No   Did the patient receive a copy of their discharge instructions? Yes   Nursing interventions Reviewed instructions with patient   What is the patient's perception of their health status since discharge? New symptoms unrelated to diagnosis  [spouse says patient has lost complete hearing- she is calling cardiology today to see if he can get cleared for his ear surgery]   Is the patient/caregiver able to teach back signs and symptoms related to disease process for when to call PCP? Yes   Is the patient/caregiver able to teach back signs and symptoms related to disease process for when to call 911? Yes   Is the patient/caregiver able to teach back the hierarchy of who to call/visit for symptoms/problems? PCP, Specialist, Home health nurse, Urgent Care, ED, 911 Yes   TCM call completed? Yes   Wrap up additional comments Per spouse, patient is doing ok but has lost complete hearing, she is going to get in touch with cardiology to see if they will clear him for his ear surgery, confirmed f/u appt with PCP on  11/29.   Call end time 1153   Would this patient benefit from a Referral to [facility] Social Work? No   Is the patient interested in additional calls from an ambulatory ?  NOTE:  applies to high risk patients requiring additional follow-up. No          Sera Best RN    11/17/2022, 11:53 EST

## 2022-11-17 NOTE — TELEPHONE ENCOUNTER
----- Message from Froylan Booker MD sent at 11/17/2022 12:48 PM EST -----  Please get this jeanne in in 2 weeks to see me.  He needs preoperative clearance and recently had an abnormal stress test in the ER.  Thank you.

## 2022-11-17 NOTE — TELEPHONE ENCOUNTER
Caller: Deepika Romero    Relationship: Emergency Contact    Best call back number:[phone]    What is the best time to reach you: ANYTIME    Who are you requesting to speak with (clinical staff, provider,  specific staff member): DR SHRESTHA    What was the call regarding: PATIENT'S WIFE STATES THAT HER  IS HAVING A COCHLEAR SURGERY AND HIS CARDIOLOGISTS HAS SENT OVER RESULTS FROM TEST THAT WERE RAN. PATIENT'S WIFE STATES THAT THEY NEED  TO REVIEW THEM AND GIVE THE APPROVAL FOR THE PATIENT TO HAVE THE SURGERY. PATIENT'S WIFE REQUESTING A CALLBACK WITH UPDATES.

## 2022-11-23 ENCOUNTER — OFFICE VISIT (OUTPATIENT)
Dept: CARDIOLOGY | Facility: CLINIC | Age: 87
End: 2022-11-23

## 2022-11-23 VITALS
WEIGHT: 170 LBS | DIASTOLIC BLOOD PRESSURE: 60 MMHG | HEART RATE: 60 BPM | SYSTOLIC BLOOD PRESSURE: 112 MMHG | BODY MASS INDEX: 23.03 KG/M2 | HEIGHT: 72 IN

## 2022-11-23 DIAGNOSIS — R07.9 CHEST PAIN, UNSPECIFIED TYPE: Primary | ICD-10-CM

## 2022-11-23 PROCEDURE — 99214 OFFICE O/P EST MOD 30 MIN: CPT | Performed by: INTERNAL MEDICINE

## 2022-11-23 PROCEDURE — 93000 ELECTROCARDIOGRAM COMPLETE: CPT | Performed by: INTERNAL MEDICINE

## 2022-11-23 NOTE — PROGRESS NOTES
Jim Romero  3/17/1931  Date of Office Visit: 11/23/22  Encounter Provider: Froylan Booker MD  Place of Service: [facility] CARDIOLOGY      CHIEF COMPLAINT:  Chest pain  Abnormal stress test  Preoperative restratification     HISTORY OF PRESENT ILLNESS:  89-year-old male patient who presented to the emergency room on 11/16/2022 with report of lower chest and epigastric discomfort.  Reportedly this was when he was walking and then raking leaves.  It is relieved by rest.  He denied rest pain.  His cardiac biomarkers were negative.  His electrocardiogram in the ER was reviewed and showed a sinus rhythm with just nonspecific ST-T wave abnormalities.  His transthoracic echocardiogram showed normal left ventricular size and systolic function and no wall motion abnormalities.  There was mild aortic valve stenosis and mild to moderate tricuspid valve regurgitation along with mild mitral valve regurgitation.  There was a very small pericardial effusion also documented.  He underwent perfusion stress test which is listed below.  I have reviewed the images and I do not agree with the read.  There looks to be a fixed area of decreased perfusion in the inferior wall and septum towards that is distal and apical.  I do not see significant reversibility.    He was started on aspirin along with isosorbide mononitrate and Toprol and subsequently discharged from the ER.  Patient does state that he still occasionally has chest burning that is short in duration and mild with walking but is able to work outside in his yard without any limitation or discomfort.  He denies any pain at rest.  His EKG is without signs of ischemia.  He does complain that the isosorbide is giving him a headache.    Review of Systems   Constitutional: Negative for fever and malaise/fatigue.   HENT: Negative for nosebleeds and sore throat.    Eyes: Negative for blurred vision and double vision.   Cardiovascular: Negative  "for chest pain, claudication, palpitations and syncope.   Respiratory: Negative for cough, shortness of breath and snoring.    Endocrine: Negative for cold intolerance, heat intolerance and polydipsia.   Skin: Negative for itching, poor wound healing and rash.   Musculoskeletal: Negative for joint pain, joint swelling, muscle weakness and myalgias.   Gastrointestinal: Negative for abdominal pain, melena, nausea and vomiting.   Neurological: Negative for light-headedness, loss of balance, seizures, vertigo and weakness.   Psychiatric/Behavioral: Negative for altered mental status and depression.       Past Medical History:   Diagnosis Date   • Benign prostatic hyperplasia    • GERD (gastroesophageal reflux disease)    • Medication management    • RLS (restless legs syndrome)    • Sciatica        The following portions of the patient's history were reviewed and updated as appropriate: Social history , Family history and Surgical history     Current Outpatient Medications on File Prior to Visit   Medication Sig Dispense Refill   • aspirin 81 MG EC tablet Take 1 tablet by mouth Daily for 30 days. 30 tablet 0   • calcium carbonate-cholecalciferol 500-400 MG-UNIT tablet tablet Take 1 tablet by mouth Daily.     • isosorbide mononitrate (IMDUR) 30 MG 24 hr tablet Take 1 tablet by mouth Daily for 30 days. 30 tablet 0   • metoprolol succinate XL (TOPROL-XL) 25 MG 24 hr tablet Take 1 tablet by mouth Daily for 30 days. 30 tablet 0   • pantoprazole (PROTONIX) 40 MG EC tablet TAKE ONE TABLET BY MOUTH DAILY 90 tablet 2   • pramipexole (MIRAPEX) 1.5 MG tablet TAKE ONE TABLET BY MOUTH ONCE NIGHTLY 90 tablet 1   • tamsulosin (FLOMAX) 0.4 MG capsule 24 hr capsule TAKE ONE CAPSULE BY MOUTH TWICE A DAY (Patient taking differently: 2 capsules Daily.) 180 capsule 1     No current facility-administered medications on file prior to visit.       No Known Allergies    Vitals:    11/23/22 1341   Height: 182.9 cm (72\")     Body mass index is " 22.78 kg/m².   Constitutional:       Appearance: Well-developed.   Eyes:      General: No scleral icterus.     Conjunctiva/sclera: Conjunctivae normal.   HENT:      Head: Normocephalic and atraumatic.   Neck:      Thyroid: No thyromegaly.      Vascular: Normal carotid pulses. No carotid bruit, hepatojugular reflux or JVD.      Trachea: No tracheal deviation.   Pulmonary:      Effort: No respiratory distress.      Breath sounds: Normal breath sounds. No decreased breath sounds. No wheezing. No rhonchi. No rales.   Chest:      Chest wall: Not tender to palpatation.   Cardiovascular:      Normal rate. Regular rhythm.      No gallop.   Pulses:     Carotid: 2+ bilaterally.     Radial: 2+ bilaterally.     Femoral: 2+ bilaterally.     Dorsalis pedis: 2+ bilaterally.     Posterior tibial: 2+ bilaterally.  Edema:     Peripheral edema absent.   Abdominal:      General: Bowel sounds are normal. There is no distension.      Palpations: Abdomen is soft.      Tenderness: There is no abdominal tenderness.   Musculoskeletal:         General: No deformity.      Cervical back: Normal range of motion and neck supple. Skin:     Findings: No erythema or rash.   Neurological:      Mental Status: Alert and oriented to person, place, and time.      Sensory: No sensory deficit.   Psychiatric:         Behavior: Behavior normal.            Lab Results   Component Value Date    WBC 7.17 11/16/2022    HGB 11.0 (L) 11/16/2022    HCT 33.8 (L) 11/16/2022    MCV 82.6 11/16/2022     11/16/2022       Lab Results   Component Value Date    GLUCOSE 94 11/16/2022    BUN 16 11/16/2022    CREATININE 0.95 11/16/2022    EGFRIFNONA 57 (L) 09/13/2021    EGFRIFAFRI 69 09/13/2021    BCR 16.8 11/16/2022    K 4.5 11/16/2022    CO2 23.6 11/16/2022    CALCIUM 8.8 11/16/2022    PROTENTOTREF 6.2 10/20/2022    ALBUMIN 3.80 11/15/2022    LABIL2 1.8 10/20/2022    AST 15 11/15/2022    ALT 10 11/15/2022       Lab Results   Component Value Date    GLUCOSE 94  11/16/2022    CALCIUM 8.8 11/16/2022     11/16/2022    K 4.5 11/16/2022    CO2 23.6 11/16/2022     11/16/2022    BUN 16 11/16/2022    CREATININE 0.95 11/16/2022    EGFRIFAFRI 69 09/13/2021    EGFRIFNONA 57 (L) 09/13/2021    BCR 16.8 11/16/2022    ANIONGAP 9.4 11/16/2022       Lab Results   Component Value Date    CHLPL 165 10/20/2022    TRIG 57 10/20/2022    HDL 51 10/20/2022     (H) 10/20/2022         ECG 12 Lead    Date/Time: 11/23/2022 2:30 PM  Performed by: Froylan Booker MD  Authorized by: Froylan Booker MD   Comparison: compared with previous ECG from 11/16/2022  Similar to previous ECG  Rhythm: sinus rhythm  Rate: normal  QRS axis: normal    Clinical impression: non-specific ECG  Comments: rsr' pattern             Results for orders placed during the hospital encounter of 11/15/22    Adult Transthoracic Echo Complete W/ Cont if Necessary Per Protocol    Interpretation Summary  •  Left ventricular systolic function is normal. Left ventricular ejection fraction appears to be 61 - 65%.  •  Left ventricular wall thickness is consistent with moderate concentric hypertrophy.  •  Left ventricular diastolic function is consistent with (grade I) impaired relaxation.  •  The right ventricular cavity is mildly dilated. Normal right ventricular systolic function noted.  •  The left atrial cavity is moderately dilated.  •  The interatrial septum appears redundant. The agitated saline study is positive with Valsalva, consistent with a small to moderate PFO  •  Mild to moderate aortic valve stenosis is present. Aortic valve maximum pressure gradient is 25 mmHg. Aortic valve mean pressure gradient is 15 mmHg.  •  Mild mitral valve regurgitation is present.  •  Mild to moderate tricuspid valve regurgitation is present  •  Calculated right ventricular systolic pressure from tricuspid regurgitation is 39 mmHg.  •  There is a small (<1cm) circumferential pericardial effusion. There is no  evidence of cardiac tamponade.    Stress    ST segment depression of 0.5 mm in the inferolateral leads was noted during stress (II, III, aVF, V5 and V6), beginning at 3 minutes of stress.  •  Myocardial perfusion imaging indicates a medium-sized, moderately severe area of ischemia located in the inferior wall and septal wall.  •  Left ventricular ejection fraction is normal (Calculated EF = 65%).  •  Impressions are consistent with a high risk study.      DISCUSSION/SUMMARY  89-year-old male with a medical history of chest pain, mild aortic valve stenosis, fixed inferoseptal to apical defect on stress test on my review, who presents to me with stable angina.  His pain is short in duration and mild.  He has a preserved ejection fraction and preserved wall motion.  He is on aspirin and metoprolol but I will get him off of the isosorbide with headaches.    This is not a high risk presentation and in my opinion the stress test is not high risk either.  I would place him at an intermediate risk to move forward with his surgery and I would not recommend moving forward with coronary angiography prior to.      1.  Abnormal stress test: Inferoseptal fixed defect on my review  -Continue metoprolol therapy at current dose.  Stop Imdur.  Continue aspirin with okay to hold for 5 days prior to the procedure.  -I do not think there is an indication at this point in time to move forward with coronary angiography.  He is not a high risk to move forward with his upcoming cochlear implant surgery.  -If he continues to have issues that were concerning to him in the future coronary angiography I think could be on the table considering that his functional status is high-level.    2.  Mild aortic valve stenosis: Asymptomatic.  We will continue to follow.    I will send over clearance to Dr. Clemens.  This note will be sent to Dr. Gilman as well

## 2022-11-29 ENCOUNTER — OFFICE VISIT (OUTPATIENT)
Dept: INTERNAL MEDICINE | Facility: CLINIC | Age: 87
End: 2022-11-29

## 2022-11-29 ENCOUNTER — TELEPHONE (OUTPATIENT)
Dept: INTERNAL MEDICINE | Facility: CLINIC | Age: 87
End: 2022-11-29

## 2022-11-29 VITALS
OXYGEN SATURATION: 98 % | WEIGHT: 173.2 LBS | RESPIRATION RATE: 16 BRPM | DIASTOLIC BLOOD PRESSURE: 70 MMHG | HEART RATE: 61 BPM | SYSTOLIC BLOOD PRESSURE: 110 MMHG | HEIGHT: 72 IN | TEMPERATURE: 96.3 F | BODY MASS INDEX: 23.46 KG/M2

## 2022-11-29 DIAGNOSIS — R07.9 CHEST PAIN, UNSPECIFIED TYPE: ICD-10-CM

## 2022-11-29 DIAGNOSIS — Z23 NEED FOR VACCINATION: Primary | ICD-10-CM

## 2022-11-29 PROCEDURE — 1111F DSCHRG MED/CURRENT MED MERGE: CPT | Performed by: INTERNAL MEDICINE

## 2022-11-29 PROCEDURE — 90662 IIV NO PRSV INCREASED AG IM: CPT | Performed by: INTERNAL MEDICINE

## 2022-11-29 PROCEDURE — 99495 TRANSJ CARE MGMT MOD F2F 14D: CPT | Performed by: INTERNAL MEDICINE

## 2022-11-29 PROCEDURE — G0008 ADMIN INFLUENZA VIRUS VAC: HCPCS | Performed by: INTERNAL MEDICINE

## 2022-11-29 NOTE — PROGRESS NOTES
Transitional Care Follow Up Visit  Subjective     Jim Romero is a 89 y.o. male who presents for a transitional care management visit.    Within 48 business hours after discharge our office contacted him via telephone to coordinate his care and needs.      I reviewed and discussed the details of that call along with the discharge summary, hospital problems, inpatient lab results, inpatient diagnostic studies, and consultation reports with Jim.     Current outpatient and discharge medications have been reconciled for the patient.  Reviewed by: Reji Gilman MD      Date of TCM Phone Call 11/16/2022   [facility]   Date of Admission 11/15/2022   Date of Discharge 11/16/2022   Discharge Disposition Home or Self Care     Risk for Readmission (LACE) Score: 1 (11/16/2022  6:01 AM)      History of Present Illness  Patient was hospitalized overnight 13 days ago with lower chest or midepigastric pain that was atypical.  Thought he was having heartburn.  Presented for cochlear implant and diverted to the emergency room.  Troponins were negative.  EKG had some nonspecific changes.  A TTE was unremarkable other than mild aortic stenosis and mild to moderate tricuspid and mitral regurgitation.  A perfusion stress test was felt to be indeterminate but upon further review was felt to be negative by Dr. Booker.  The defect was felt to be permanent.  This was consistent with a low risk study.  However the patient continues to have what he describes as heartburn that occurs with ambulation and resolves with rest.  He will have some associated shortness of breath.  One half block or 2 flights of steps would be enough to cause symptoms.     Course During Hospital Stay:  89-year-old male patient who presented to the emergency room on 11/16/2022 with report of lower chest and epigastric discomfort.  Reportedly this was when he was walking and then raking leaves.  It is relieved by rest.  He denied rest pain.   His cardiac biomarkers were negative.  His electrocardiogram in the ER was reviewed and showed a sinus rhythm with just nonspecific ST-T wave abnormalities.  His transthoracic echocardiogram showed normal left ventricular size and systolic function and no wall motion abnormalities.  There was mild aortic valve stenosis and mild to moderate tricuspid valve regurgitation along with mild mitral valve regurgitation.  There was a very small pericardial effusion also documented.  He underwent perfusion stress test which is listed below.  I have reviewed the images and I do not agree with the read.  There looks to be a fixed area of decreased perfusion in the inferior wall and septum towards that is distal and apical.  I do not see significant reversibility.     He was started on aspirin along with isosorbide mononitrate and Toprol and subsequently discharged from the ER.  Patient does state that he still occasionally has chest burning that is short in duration and mild with walking but is able to work outside in his yard without any limitation or discomfort.  He denies any pain at rest.  His EKG is without signs of ischemia.  He does complain that the isosorbide is giving him a headache.     The following portions of the patient's history were reviewed and updated as appropriate: allergies, current medications, past medical history and problem list.    Review of Systems   Constitutional: Negative for chills and fever.   Respiratory: Positive for shortness of breath.    Cardiovascular: Positive for chest pain. Negative for palpitations.       Objective   Physical Exam  Constitutional:       Appearance: Normal appearance. He is well-developed.   Neck:      Thyroid: No thyromegaly.   Cardiovascular:      Rate and Rhythm: Normal rate and regular rhythm.      Heart sounds: Murmur heard.    Systolic murmur is present with a grade of 2/6.    No gallop.      Comments: Grade 2/6 systolic murmur left midsternal border  Pulmonary:       Effort: Pulmonary effort is normal. No respiratory distress.      Breath sounds: Normal breath sounds. No wheezing or rales.   Abdominal:      General: Bowel sounds are normal.      Palpations: Abdomen is soft. There is no mass.      Tenderness: There is no abdominal tenderness. There is no guarding.   Neurological:      Mental Status: He is alert.         Assessment & Plan   Diagnoses and all orders for this visit:    1. Need for vaccination (Primary)  -     Fluzone High-Dose 65+yrs ([id number])    2. Chest pain, unspecified type      In today for 13-day recheck from hospitalization with exertional chest pain.  Stress test was felt to be indeterminate in the hospital but low risk by Dr. Booker.  Nonetheless his symptoms are fairly classic with chest pain with ambulating a half a block or 2 flights of stairs with associated shortness of breath that is resolved with rest.  Symptoms will clear up within 5 minutes of resting.  He noticed very little difference in his symptoms while taking isosorbide and actually had headaches with it and it was discontinued.  He is taking Toprol-XL 25 mg daily now as well.  He is taking a daily dose of Protonix for 10 years or so.  Also takes aspirin 81 mg every day now.  His exertional chest pain began about 1 month ago.  He has been cleared by cardiology to have his cochlear implant and I think that is not unreasonable.  That should be fairly minor operation as far as cardiac strain is concerned.  Given his age and the exertional nature of his symptoms a cardiac intervention would not appear to be indicated at this time irregardless.  We will try him on a lower dose of isosorbide and see if that is tolerated, 15 mg.  He will plan to follow-up with Dr. Booker in 2 months.    The above information was reviewed again today 11/29/22.  It continues to be accurate as reflected above and is unchanged.  History, physical and review of systems all reviewed and are unchanged.  Medications  were reviewed today and continue the current dosing.    PPE today includes face mask and eye shield.

## 2022-11-29 NOTE — TELEPHONE ENCOUNTER
Write a letter for patient to whom it may concern: Mr. Romero has developed mild stable angina pectoris over the past month or so.  He does not need an intervention at the present time and therefore has no modifiable risk factors at the present time that need to be dealt with regarding proceeding with his planned cochlear implant.  We think it is safe to proceed.  He should be off of his aspirin for 1 week prior to surgery.    Call patient and let him know we are sending him this letter that his surgeon may require.  Let him know that we think it is okay for him to proceed with his cochlear implant and further that I discussed this with Dr. Booker as well.  Dr. Booker does believe that his chest pain is coming from his heart and would prefer not to do an intervention but if the symptoms progress or worsen he may need to do exactly that.

## 2022-12-06 ENCOUNTER — OFFICE VISIT (OUTPATIENT)
Dept: INTERNAL MEDICINE | Facility: CLINIC | Age: 87
End: 2022-12-06

## 2022-12-06 VITALS
HEIGHT: 72 IN | OXYGEN SATURATION: 98 % | DIASTOLIC BLOOD PRESSURE: 54 MMHG | WEIGHT: 177 LBS | HEART RATE: 73 BPM | BODY MASS INDEX: 23.98 KG/M2 | SYSTOLIC BLOOD PRESSURE: 112 MMHG | TEMPERATURE: 97.7 F | RESPIRATION RATE: 16 BRPM

## 2022-12-06 DIAGNOSIS — Z00.00 ENCOUNTER FOR ANNUAL WELLNESS EXAM IN MEDICARE PATIENT: Primary | ICD-10-CM

## 2022-12-06 PROCEDURE — 1126F AMNT PAIN NOTED NONE PRSNT: CPT | Performed by: INTERNAL MEDICINE

## 2022-12-06 PROCEDURE — 1170F FXNL STATUS ASSESSED: CPT | Performed by: INTERNAL MEDICINE

## 2022-12-06 PROCEDURE — G0439 PPPS, SUBSEQ VISIT: HCPCS | Performed by: INTERNAL MEDICINE

## 2022-12-06 PROCEDURE — 1160F RVW MEDS BY RX/DR IN RCRD: CPT | Performed by: INTERNAL MEDICINE

## 2022-12-06 RX ORDER — ISOSORBIDE MONONITRATE 30 MG/1
30 TABLET, EXTENDED RELEASE ORAL DAILY
COMMUNITY
End: 2023-01-09

## 2022-12-06 NOTE — PROGRESS NOTES
The ABCs of the Annual Wellness Visit  Subsequent Medicare Wellness Visit    Chief Complaint   Patient presents with   • Medicare Wellness-subsequent      Subjective    History of Present Illness:  Jim Romero is a 89 y.o. male who presents for a Subsequent Medicare Wellness Visit.    The following portions of the patient's history were reviewed and   updated as appropriate: allergies, current medications, past family history, past medical history, past social history, past surgical history and problem list.    Compared to one year ago, the patient feels his physical   health is the same.    Compared to one year ago, the patient feels his mental   health is the same.    Recent Hospitalizations:  This patient has had a [facility] admission record on file within the last 365 days.    Current Medical Providers:  Patient Care Team:  Reji Gilman MD as PCP - General (Internal Medicine)  Reji Gilman MD as PCP - Internal Medicine (Internal Medicine)    Outpatient Medications Prior to Visit   Medication Sig Dispense Refill   • aspirin 81 MG EC tablet Take 1 tablet by mouth Daily for 30 days. 30 tablet 0   • calcium carbonate-cholecalciferol 500-400 MG-UNIT tablet tablet Take 1 tablet by mouth Daily.     • isosorbide mononitrate (IMDUR) 30 MG 24 hr tablet Take 30 mg by mouth Daily.     • metoprolol succinate XL (TOPROL-XL) 25 MG 24 hr tablet Take 1 tablet by mouth Daily for 30 days. 30 tablet 0   • pantoprazole (PROTONIX) 40 MG EC tablet TAKE ONE TABLET BY MOUTH DAILY 90 tablet 2   • pramipexole (MIRAPEX) 1.5 MG tablet TAKE ONE TABLET BY MOUTH ONCE NIGHTLY 90 tablet 1   • tamsulosin (FLOMAX) 0.4 MG capsule 24 hr capsule TAKE ONE CAPSULE BY MOUTH TWICE A DAY (Patient taking differently: 2 capsules Daily.) 180 capsule 1     No facility-administered medications prior to visit.       No opioid medication identified on active medication list. I have reviewed chart for other potential  high risk medication/s and  "harmful drug interactions in the elderly.          Aspirin is on active medication list. Aspirin use is indicated based on review of current medical condition/s. Pros and cons of this therapy have been discussed today. Benefits of this medication outweigh potential harm.  Patient has been encouraged to continue taking this medication.  .      Patient Active Problem List   Diagnosis   • RLS (restless legs syndrome)   • BPH (benign prostatic hyperplasia)   • GERD (gastroesophageal reflux disease)   • Sciatica   • Chronic fatigue   • IGT (impaired glucose tolerance)   • Osteoporosis   • Chest pain     Advance Care Planning  Advance Directive is not on file.  ACP discussion was held with the patient during this visit. Patient has an advance directive (not in EMR), copy requested.          Objective    Vitals:    22 0832   BP: 112/54   Pulse: 73   Resp: 16   Temp: 97.7 °F (36.5 °C)   TempSrc: Infrared   SpO2: 98%   Weight: 80.3 kg (177 lb)   Height: 182.9 cm (72.01\")   PainSc: 0-No pain     Estimated body mass index is 24 kg/m² as calculated from the following:    Height as of this encounter: 182.9 cm (72.01\").    Weight as of this encounter: 80.3 kg (177 lb).    BMI is within normal parameters. No other follow-up for BMI required.      Does the patient have evidence of cognitive impairment? No    Physical Exam  Lab Results   Component Value Date    CHLPL 165 10/20/2022    TRIG 57 10/20/2022    HDL 51 10/20/2022     (H) 10/20/2022    VLDL 11 10/20/2022    HGBA1C 6.10 (H) 10/20/2022            HEALTH RISK ASSESSMENT    Smoking Status:  Social History     Tobacco Use   Smoking Status Former   • Packs/day: 1.50   • Types: Cigarettes   • Start date:    • Quit date:    • Years since quittin.9   Smokeless Tobacco Never     Alcohol Consumption:  Social History     Substance and Sexual Activity   Alcohol Use Yes    Comment: 1-2 x month     Fall Risk Screen:    LICO Fall Risk Assessment was completed, " and patient is at HIGH risk for falls. Assessment completed on:12/6/2022    Depression Screening:  PHQ-2/PHQ-9 Depression Screening 12/6/2022   Retired Total Score -   Little Interest or Pleasure in Doing Things 0-->not at all   Feeling Down, Depressed or Hopeless 0-->not at all   Trouble Falling or Staying Asleep, or Sleeping Too Much 3-->nearly every day   Feeling Tired or Having Little Energy 3-->nearly every day   Poor Appetite or Overeating 2-->more than half the days   Feeling Bad about Yourself - or that You are a Failure or Have Let Yourself or Your Family Down 0-->not at all   Trouble Concentrating on Things, Such as Reading the Newspaper or Watching Television 0-->not at all   Moving or Speaking So Slowly that Other People Could Have Noticed? Or the Opposite - Being So Fidgety 0-->not at all   Thoughts that You Would be Better Off Dead or of Hurting Yourself in Some Way 0-->not at all   PHQ-9: Brief Depression Severity Measure Score 8   If You Checked Off Any Problems, How Difficult Have These Problems Made It For You to Do Your Work, Take Care of Things at Home, or Get Along with Other People? not difficult at all       Health Habits and Functional and Cognitive Screening:  Functional & Cognitive Status 12/6/2022   Do you have difficulty preparing food and eating? No   Do you have difficulty bathing yourself, getting dressed or grooming yourself? No   Do you have difficulty using the toilet? No   Do you have difficulty moving around from place to place? No   Do you have trouble with steps or getting out of a bed or a chair? Yes   Current Diet Unhealthy Diet   Dental Exam Up to date   Eye Exam Up to date   Exercise (times per week) 2 times per week   Current Exercises Include Yard Work;Walking   Current Exercise Activities Include -   Do you need help using the phone?  No   Are you deaf or do you have serious difficulty hearing?  Yes   Do you need help with transportation? No   Do you need help shopping?  No   Do you need help preparing meals?  No   Do you need help with housework?  No   Do you need help with laundry? No   Do you need help taking your medications? No   Do you need help managing money? No   Do you ever drive or ride in a car without wearing a seat belt? No   Have you felt unusual stress, anger or loneliness in the last month? No   Who do you live with? Spouse   If you need help, do you have trouble finding someone available to you? No   Have you been bothered in the last four weeks by sexual problems? No   Do you have difficulty concentrating, remembering or making decisions? No       Age-appropriate Screening Schedule:  Refer to the list below for future screening recommendations based on patient's age, sex and/or medical conditions. Orders for these recommended tests are listed in the plan section. The patient has been provided with a written plan.    Health Maintenance   Topic Date Due   • DXA SCAN  07/23/2020   • TDAP/TD VACCINES (2 - Td or Tdap) 05/17/2028   • INFLUENZA VACCINE  Completed   • ZOSTER VACCINE  Discontinued              Assessment & Plan   CMS Preventative Services Quick Reference  Risk Factors Identified During Encounter  Fall Risk-High or Moderate  Inactivity/Sedentary  The above risks/problems have been discussed with the patient.  Follow up actions/plans if indicated are seen below in the Assessment/Plan Section.  Pertinent information has been shared with the patient in the After Visit Summary.    Diagnoses and all orders for this visit:    1. Encounter for annual wellness exam in Medicare patient (Primary)        Follow Up:   No follow-ups on file.     An After Visit Summary and PPPS were made available to the patient.          I spent 15 minutes caring for Gene on this date of service. This time includes time spent by me in the following activities:preparing for the visit and reviewing tests

## 2023-01-03 ENCOUNTER — TELEPHONE (OUTPATIENT)
Dept: CARDIOLOGY | Facility: CLINIC | Age: 88
End: 2023-01-03

## 2023-01-03 NOTE — TELEPHONE ENCOUNTER
Caller: Jim Romero    Relationship to patient: Self    Best call back number: [phone]    Patient is needing: PT WAS ADVISED THAT WE DID NOT NEED TO SEE HIM UNTIL AFTER HIS EAR SURGERY, THAT HAS BEEN POSTPONED HIS WIFE WAS IN THE HOSPITAL. HE IS NOT SURE IF HE NEEDS TO KEEP HIS APTS ON 1/9/23 AND 1/25/23.  PLEASE CALL TO ADVISE.     THANK YOU.

## 2023-01-09 ENCOUNTER — OFFICE VISIT (OUTPATIENT)
Dept: CARDIOLOGY | Facility: CLINIC | Age: 88
End: 2023-01-09
Payer: MEDICARE

## 2023-01-09 VITALS
DIASTOLIC BLOOD PRESSURE: 76 MMHG | HEIGHT: 72 IN | WEIGHT: 173.4 LBS | SYSTOLIC BLOOD PRESSURE: 120 MMHG | HEART RATE: 69 BPM | BODY MASS INDEX: 23.49 KG/M2

## 2023-01-09 DIAGNOSIS — R94.39 ABNORMAL NUCLEAR STRESS TEST: ICD-10-CM

## 2023-01-09 DIAGNOSIS — I35.0 AORTIC STENOSIS, MILD: Primary | ICD-10-CM

## 2023-01-09 PROCEDURE — 93000 ELECTROCARDIOGRAM COMPLETE: CPT | Performed by: NURSE PRACTITIONER

## 2023-01-09 PROCEDURE — 99214 OFFICE O/P EST MOD 30 MIN: CPT | Performed by: NURSE PRACTITIONER

## 2023-01-09 RX ORDER — ASPIRIN 81 MG/1
81 TABLET ORAL DAILY
Qty: 90 TABLET | Refills: 3 | Status: SHIPPED | OUTPATIENT
Start: 2023-01-09

## 2023-01-09 RX ORDER — METOPROLOL SUCCINATE 25 MG/1
25 TABLET, EXTENDED RELEASE ORAL
Qty: 90 TABLET | Refills: 3 | Status: SHIPPED | OUTPATIENT
Start: 2023-01-09 | End: 2023-02-08

## 2023-01-09 RX ORDER — ISOSORBIDE MONONITRATE 30 MG/1
30 TABLET, EXTENDED RELEASE ORAL DAILY
Qty: 90 TABLET | Refills: 3 | Status: SHIPPED | OUTPATIENT
Start: 2023-01-09

## 2023-01-09 NOTE — PROGRESS NOTES
Date of Office Visit: 2023  Encounter Provider: LONNY Cody  Place of Service: [facility] CARDIOLOGY  Patient Name: Jim Romero  :3/17/1931    No chief complaint on file.  : follow up  Abnormal stress test    HPI: Jim Romero is a 89 y.o. male who is a patient with no past cardiac medical history presented to [facility] emergency room on 11/15/2022 per his preoperative team's request.  Patient was scheduled to have cochlear implants that morning.  He reported some intermittent lower chest/epigastric discomfort that radiated down his left arm over 2 weeks.  It was exertional.  Tums seem to help.  He had no other associated symptoms.  Couple weeks prior to presenting to ED, his wife called EMS after he had an episode of weakness and dizziness.  He declined coming to the hospital and denied any chest discomfort at that time.    Patient retired from [employer].  He notes that he had a stress test each year as part of his annual physical.  He stated that they would always stop the treadmill and tell him that he was doing terrible.  Patient had a normal stress echo in May 2017 which showed normal left ventricular systolic function and mild aortic insufficiency. Patient had no ischemic changes on his EKG and normal troponin level while in observation.  His risk factors were low as his lipid panel was in target range and his latest hemoglobin A1c 6.10.  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     ED work-up was negative.  A walking Lexiscan stress test was ordered that showed indeterminate risk with fixed inferior septal to apical defect.  He was started on medical managed with Toprol-XL 25 mg daily, Imdur 30 mg daily and aspirin 81 mg daily.   An echocardiogram showed normal left ventricular function as well as mild aortic insufficiency, mild aortic stenosis and mild mitral regurgitation.  Patient was cleared to have his cochlear implant surgery.      Patient followed up with Dr. Booker in office on 2022.  No changes were made.    Today patient presents with no new complaints.  He continues to have some chest pain with exertion such as when he walks half a block or walking upstairs.  He states that it does not happen all the time just sometimes.  He has no other associated symptoms.  He denies shortness of air, syncope or near syncope. He states that when taking the Imdur, it gave him a headache therefore his PCP told him to cut it in half.  He states that he really did not notice any difference in the chest discomfort.  He has not been taking his Toprol as he did not have any refills.  And he has not been taking his aspirin because he is not have refills on that as well.  His blood pressure is well controlled.  There are no new EKG changes.  He has yet to have his cochlear implantation due to taking care of his wife.  His wife has been having uncontrolled high blood pressure and he has been taking her to the hospital and doctors office appointments.     Previous testing and notes have been reviewed by me.   Past Medical History:   Diagnosis Date   • Benign prostatic hyperplasia    • GERD (gastroesophageal reflux disease)    • Medication management    • RLS (restless legs syndrome)    • Sciatica        No past surgical history on file.    Social History     Socioeconomic History   • Marital status:    Tobacco Use   • Smoking status: Former     Packs/day: 1.50     Types: Cigarettes     Start date:      Quit date:      Years since quittin.0   • Smokeless tobacco: Never   Substance and Sexual Activity   • Alcohol use: Yes     Comment: 1-2 x month   • Drug use: No       Family History   Problem Relation Age of Onset   • Melanoma Mother 77       Review of Systems   Constitutional: Negative.   HENT: Negative.    Eyes: Negative.    Cardiovascular: Positive for chest pain.   Respiratory: Negative.    Endocrine: Negative.     Hematologic/Lymphatic: Negative.    Skin: Negative.    Musculoskeletal: Negative.    Gastrointestinal: Negative.    Genitourinary: Negative.    Neurological: Negative.    Psychiatric/Behavioral: Negative.    Allergic/Immunologic: Negative.        No Known Allergies      Current Outpatient Medications:   •  calcium carbonate-cholecalciferol 500-400 MG-UNIT tablet tablet, Take 1 tablet by mouth Daily., Disp: , Rfl:   •  isosorbide mononitrate (IMDUR) 30 MG 24 hr tablet, Take 30 mg by mouth Daily., Disp: , Rfl:   •  metoprolol succinate XL (TOPROL-XL) 25 MG 24 hr tablet, Take 1 tablet by mouth Daily for 30 days., Disp: 30 tablet, Rfl: 0  •  pantoprazole (PROTONIX) 40 MG EC tablet, TAKE ONE TABLET BY MOUTH DAILY, Disp: 90 tablet, Rfl: 2  •  pramipexole (MIRAPEX) 1.5 MG tablet, TAKE ONE TABLET BY MOUTH ONCE NIGHTLY, Disp: 90 tablet, Rfl: 1  •  tamsulosin (FLOMAX) 0.4 MG capsule 24 hr capsule, TAKE ONE CAPSULE BY MOUTH TWICE A DAY (Patient taking differently: 2 capsules Daily.), Disp: 180 capsule, Rfl: 1      Objective:     There were no vitals filed for this visit.  There is no height or weight on file to calculate BMI.     Walking Lexiscan stress test 11/16/2022:  •  ST segment depression of 0.5 mm in the inferolateral leads was noted during stress (II, III, aVF, V5 and V6), beginning at 3 minutes of stress.  •  Myocardial perfusion imaging indicates a medium-sized, moderately severe area of ischemia located in the inferior wall and septal wall.  •  Left ventricular ejection fraction is normal (Calculated EF = 65%).  •  Impressions are consistent with a high risk study.    2D Echocardiogram 11/16/2022:  •  Left ventricular systolic function is normal. Left ventricular ejection fraction appears to be 61 - 65%.  •  Left ventricular wall thickness is consistent with moderate concentric hypertrophy.  •  Left ventricular diastolic function is consistent with (grade I) impaired relaxation.  •  The right ventricular cavity  is mildly dilated. Normal right ventricular systolic function noted.  •  The left atrial cavity is moderately dilated.  •  The interatrial septum appears redundant. The agitated saline study is positive with Valsalva, consistent with a small to moderate PFO  •  Mild to moderate aortic valve stenosis is present. Aortic valve maximum pressure gradient is 25 mmHg. Aortic valve mean pressure gradient is 15 mmHg.  •  Mild mitral valve regurgitation is present.  •  Mild to moderate tricuspid valve regurgitation is present  •  Calculated right ventricular systolic pressure from tricuspid regurgitation is 39 mmHg.  •  There is a small (<1cm) circumferential pericardial effusion. There is no evidence of cardiac tamponade.        PHYSICAL EXAM:    Constitutional:       Appearance: Healthy appearance. Not in distress.   Neck:      Vascular: No JVR. JVD normal.   Pulmonary:      Effort: Pulmonary effort is normal.      Breath sounds: Normal breath sounds. No wheezing. No rhonchi. No rales.   Chest:      Chest wall: Not tender to palpatation.   Cardiovascular:      PMI at left midclavicular line. Normal rate. Regular rhythm. Normal S1. Normal S2.      Murmurs: There is a systolic murmur.      No gallop. No click. No rub.   Pulses:     Intact distal pulses.   Edema:     Peripheral edema absent.   Abdominal:      General: Bowel sounds are normal.      Palpations: Abdomen is soft.      Tenderness: There is no abdominal tenderness.   Musculoskeletal: Normal range of motion.         General: No tenderness. Skin:     General: Skin is warm and dry.   Neurological:      General: No focal deficit present.      Mental Status: Alert and oriented to person, place and time.           ECG 12 Lead    Date/Time: 1/9/2023 11:15 AM  Performed by: Kaila Perez APRN  Authorized by: Kaila Perez APRN   Comparison: compared with previous ECG from 11/23/2022  Similar to previous ECG  Rhythm: sinus rhythm  Rate: normal  BPM:  69  Conduction: non-specific intraventricular conduction delay              Assessment:       Diagnosis Plan   1. Aortic stenosis, mild        2. Abnormal nuclear stress test          No orders of the defined types were placed in this encounter.         Plan:       1.  Mild aortic valve stenosis: Asymptomatic. + murmur.  Euvolemic  2.  Abnormal stress test: Inferior septal fixed defect: Normal EF.  On Toprol and aspirin.  I have refilled both aspirin and Toprol prescriptions.  Patient would like for me to continue the Imdur therefore I have sent refills for that as well.    Mr. Romero will follow up with either myself or Dr. Booker in 6 months.  He will call sooner for any questions or concerns       Your medication list          Accurate as of January 9, 2023  8:00 AM. If you have any questions, ask your nurse or doctor.            CHANGE how you take these medications      Instructions Last Dose Given Next Dose Due   tamsulosin 0.4 MG capsule 24 hr capsule  Commonly known as: FLOMAX  What changed:   · how much to take  · how to take this  · when to take this      TAKE ONE CAPSULE BY MOUTH TWICE A DAY          CONTINUE taking these medications      Instructions Last Dose Given Next Dose Due   calcium carbonate-cholecalciferol 500-400 MG-UNIT tablet tablet      Take 1 tablet by mouth Daily.       isosorbide mononitrate 30 MG 24 hr tablet  Commonly known as: IMDUR      Take 30 mg by mouth Daily.       metoprolol succinate XL 25 MG 24 hr tablet  Commonly known as: TOPROL-XL      Take 1 tablet by mouth Daily for 30 days.       pantoprazole 40 MG EC tablet  Commonly known as: PROTONIX      TAKE ONE TABLET BY MOUTH DAILY       pramipexole 1.5 MG tablet  Commonly known as: MIRAPEX      TAKE ONE TABLET BY MOUTH ONCE NIGHTLY                As always, it has been a pleasure to participate in your patient's care.      Sincerely,       LONNY Mcleod

## 2023-01-18 NOTE — TELEPHONE ENCOUNTER
Called pt. He doesn't need to be seen back until 7/10/23, so I cancelled his 1/25/23 appt. He is aware.    Eve

## 2023-02-02 RX ORDER — PRAMIPEXOLE DIHYDROCHLORIDE 1.5 MG/1
TABLET ORAL
Qty: 90 TABLET | Refills: 1 | Status: SHIPPED | OUTPATIENT
Start: 2023-02-02

## 2023-03-24 ENCOUNTER — OFFICE VISIT (OUTPATIENT)
Dept: INTERNAL MEDICINE | Facility: CLINIC | Age: 88
End: 2023-03-24
Payer: MEDICARE

## 2023-03-24 VITALS
DIASTOLIC BLOOD PRESSURE: 73 MMHG | RESPIRATION RATE: 16 BRPM | TEMPERATURE: 97.3 F | HEART RATE: 68 BPM | BODY MASS INDEX: 23.43 KG/M2 | SYSTOLIC BLOOD PRESSURE: 117 MMHG | WEIGHT: 173 LBS | HEIGHT: 72 IN | OXYGEN SATURATION: 92 %

## 2023-03-24 DIAGNOSIS — K21.9 GASTROESOPHAGEAL REFLUX DISEASE, UNSPECIFIED WHETHER ESOPHAGITIS PRESENT: Chronic | ICD-10-CM

## 2023-03-24 DIAGNOSIS — R73.02 IGT (IMPAIRED GLUCOSE TOLERANCE): Primary | Chronic | ICD-10-CM

## 2023-03-24 DIAGNOSIS — G25.81 RLS (RESTLESS LEGS SYNDROME): Chronic | ICD-10-CM

## 2023-03-24 PROCEDURE — 1159F MED LIST DOCD IN RCRD: CPT | Performed by: INTERNAL MEDICINE

## 2023-03-24 PROCEDURE — 99213 OFFICE O/P EST LOW 20 MIN: CPT | Performed by: INTERNAL MEDICINE

## 2023-03-24 PROCEDURE — 1160F RVW MEDS BY RX/DR IN RCRD: CPT | Performed by: INTERNAL MEDICINE

## 2023-03-24 NOTE — PROGRESS NOTES
Subjective   Jim Romero is a 89 y.o. male.     Chief Complaint   Patient presents with   • igt   • Heartburn   • Restless Legs Syndrome         Heartburn  He complains of abdominal pain ( heartburn). He reports no chest pain, no coughing or no wheezing.   Hyperglycemia  This is a chronic problem. The current episode started more than 1 year ago. Associated symptoms include abdominal pain ( heartburn), arthralgias ( left shoulder pain) and myalgias. Pertinent negatives include no chest pain or coughing.   Pain  This is a chronic problem. The current episode started more than 1 month ago. The problem has been unchanged. Associated symptoms include abdominal pain ( heartburn), arthralgias ( left shoulder pain) and myalgias. Pertinent negatives include no chest pain or coughing.   Arthritis  Presents for follow-up visit. The symptoms have been stable. Affected locations include the right knee, left knee, left DIP and right hip. Pertinent negatives include no diarrhea.        The following portions of the patient's history were reviewed and updated as appropriate: allergies, current medications, past social history and problem list.    Outpatient Medications Marked as Taking for the 3/24/23 encounter (Office Visit) with Reji Gilman MD   Medication Sig Dispense Refill   • aspirin 81 MG EC tablet Take 1 tablet by mouth Daily. 90 tablet 3   • calcium carbonate-cholecalciferol 500-400 MG-UNIT tablet tablet Take 1 tablet by mouth Daily.     • isosorbide mononitrate (IMDUR) 30 MG 24 hr tablet Take 1 tablet by mouth Daily. 90 tablet 3   • pantoprazole (PROTONIX) 40 MG EC tablet TAKE ONE TABLET BY MOUTH DAILY 90 tablet 2   • pramipexole (MIRAPEX) 1.5 MG tablet TAKE ONE TABLET BY MOUTH ONCE NIGHTLY 90 tablet 1   • tamsulosin (FLOMAX) 0.4 MG capsule 24 hr capsule TAKE ONE CAPSULE BY MOUTH TWICE A DAY (Patient taking differently: 2 capsules Daily.) 180 capsule 1       Review of Systems   Respiratory: Negative for cough,  shortness of breath and wheezing.    Cardiovascular: Negative for chest pain, palpitations and leg swelling.   Gastrointestinal: Positive for abdominal pain ( heartburn). Negative for constipation and diarrhea.   Musculoskeletal: Positive for arthralgias ( left shoulder pain), arthritis, back pain and myalgias.   Psychiatric/Behavioral: Nervous/anxious:          Objective   Vitals:    03/24/23 1128   BP: 117/73   Pulse: 68   Resp: 16   Temp: 97.3 °F (36.3 °C)   SpO2: 92%          03/24/23  1128   Weight: 78.5 kg (173 lb)    [unfilled]  Body mass index is 23.46 kg/m².      Physical Exam   Constitutional: He appears well-developed.   Neck: No thyromegaly present.   Cardiovascular: Normal rate, regular rhythm and normal heart sounds. Exam reveals no gallop.   No murmur heard.  Pulmonary/Chest: Effort normal and breath sounds normal. No respiratory distress. He has no wheezes. He has no rales.   Abdominal: Soft. Normal appearance and bowel sounds are normal. He exhibits no mass. There is no abdominal tenderness. There is no guarding.   Neurological: He is alert.         Problems Addressed this Visit        Endocrine and Metabolic    IGT (impaired glucose tolerance) - Primary (Chronic)       Gastrointestinal Abdominal     GERD (gastroesophageal reflux disease) (Chronic)       Neuro    RLS (restless legs syndrome) (Chronic)   Diagnoses       Codes Comments    IGT (impaired glucose tolerance)    -  Primary ICD-10-CM: R73.02  ICD-9-CM: 790.22     Gastroesophageal reflux disease, unspecified whether esophagitis present     ICD-10-CM: K21.9  ICD-9-CM: 530.81     RLS (restless legs syndrome)     ICD-10-CM: G25.81  ICD-9-CM: 333.94         Assessment & Plan   In for recheck of PMR, IGT, RLS, GERD and osteoporosis today March 2023.  No further ache in the lower back, hips, knees, thighs.  He has been off of prednisone for about 21 months now.  Now off of Prolia.  We will stop checking sed rate, CRP.  We will check glucose,  A1c today every 6 months.  Annual lab work September 2022 including CBC, CMP, A1c.  Will recheck again in 6 months.  He started on prednisone on July 9, 2018.  Annual wellness visit is due December 2023.  Restless legs do great as long as he takes his Requip.    The above information was reviewed again today 03/24/23.  It continues to be accurate as reflected above and is unchanged.  History, physical and review of systems all reviewed and are unchanged.  Medications were reviewed today and continue the current dosing.    PPE today includes face mask and eye shield.         Dragon disclaimer:   Much of this encounter note is an electronic transcription/translation of spoken language to printed text. The electronic translation of spoken language may permit erroneous, or at times, nonsensical words or phrases to be inadvertently transcribed; Although I have reviewed the note for such errors, some may still exist.

## 2023-03-25 LAB
GLUCOSE SERPL-MCNC: 69 MG/DL (ref 65–99)
HBA1C MFR BLD: 6.1 % (ref 4.8–5.6)

## 2023-05-15 RX ORDER — PANTOPRAZOLE SODIUM 40 MG/1
TABLET, DELAYED RELEASE ORAL
Qty: 90 TABLET | Refills: 2 | Status: SHIPPED | OUTPATIENT
Start: 2023-05-15

## 2023-06-05 RX ORDER — TAMSULOSIN HYDROCHLORIDE 0.4 MG/1
CAPSULE ORAL
Qty: 180 CAPSULE | Refills: 1 | Status: SHIPPED | OUTPATIENT
Start: 2023-06-05

## 2023-09-11 RX ORDER — ASPIRIN 81 MG/1
TABLET, COATED ORAL
Qty: 90 TABLET | Refills: 3 | Status: SHIPPED | OUTPATIENT
Start: 2023-09-11

## 2023-09-29 ENCOUNTER — LAB (OUTPATIENT)
Dept: LAB | Facility: HOSPITAL | Age: 88
End: 2023-09-29
Payer: MEDICARE

## 2023-09-29 ENCOUNTER — OFFICE VISIT (OUTPATIENT)
Dept: INTERNAL MEDICINE | Facility: CLINIC | Age: 88
End: 2023-09-29
Payer: MEDICARE

## 2023-09-29 VITALS
OXYGEN SATURATION: 98 % | WEIGHT: 169.3 LBS | HEIGHT: 72 IN | DIASTOLIC BLOOD PRESSURE: 60 MMHG | HEART RATE: 66 BPM | SYSTOLIC BLOOD PRESSURE: 100 MMHG | BODY MASS INDEX: 22.93 KG/M2 | TEMPERATURE: 96.3 F | RESPIRATION RATE: 16 BRPM

## 2023-09-29 DIAGNOSIS — Z23 NEED FOR VACCINATION: ICD-10-CM

## 2023-09-29 DIAGNOSIS — G25.81 RLS (RESTLESS LEGS SYNDROME): Chronic | ICD-10-CM

## 2023-09-29 DIAGNOSIS — K21.9 GASTROESOPHAGEAL REFLUX DISEASE, UNSPECIFIED WHETHER ESOPHAGITIS PRESENT: Chronic | ICD-10-CM

## 2023-09-29 DIAGNOSIS — R53.82 CHRONIC FATIGUE: ICD-10-CM

## 2023-09-29 DIAGNOSIS — R73.02 IGT (IMPAIRED GLUCOSE TOLERANCE): Primary | Chronic | ICD-10-CM

## 2023-09-29 DIAGNOSIS — Z79.899 MEDICATION MANAGEMENT: ICD-10-CM

## 2023-09-29 PROBLEM — I35.0 AORTIC STENOSIS, MILD: Chronic | Status: ACTIVE | Noted: 2023-01-09

## 2023-09-29 PROBLEM — R94.39 ABNORMAL NUCLEAR STRESS TEST: Status: ACTIVE | Noted: 2023-09-29

## 2023-09-29 LAB
ALBUMIN SERPL-MCNC: 3.5 G/DL (ref 3.5–5.2)
ALBUMIN/GLOB SERPL: 1.4 G/DL
ALP SERPL-CCNC: 52 U/L (ref 39–117)
ALT SERPL W P-5'-P-CCNC: 12 U/L (ref 1–41)
ANION GAP SERPL CALCULATED.3IONS-SCNC: 5.5 MMOL/L (ref 5–15)
AST SERPL-CCNC: 15 U/L (ref 1–40)
BASOPHILS # BLD AUTO: 0.04 10*3/MM3 (ref 0–0.2)
BASOPHILS NFR BLD AUTO: 0.5 % (ref 0–1.5)
BILIRUB SERPL-MCNC: 0.5 MG/DL (ref 0–1.2)
BUN SERPL-MCNC: 24 MG/DL (ref 8–23)
BUN/CREAT SERPL: 19.4 (ref 7–25)
CALCIUM SPEC-SCNC: 9.1 MG/DL (ref 8.2–9.6)
CHLORIDE SERPL-SCNC: 106 MMOL/L (ref 98–107)
CO2 SERPL-SCNC: 27.5 MMOL/L (ref 22–29)
CREAT SERPL-MCNC: 1.24 MG/DL (ref 0.76–1.27)
DEPRECATED RDW RBC AUTO: 42.3 FL (ref 37–54)
EGFRCR SERPLBLD CKD-EPI 2021: 54.5 ML/MIN/1.73
EOSINOPHIL # BLD AUTO: 0.09 10*3/MM3 (ref 0–0.4)
EOSINOPHIL NFR BLD AUTO: 1.2 % (ref 0.3–6.2)
ERYTHROCYTE [DISTWIDTH] IN BLOOD BY AUTOMATED COUNT: 14.4 % (ref 12.3–15.4)
GLOBULIN UR ELPH-MCNC: 2.5 GM/DL
GLUCOSE SERPL-MCNC: 60 MG/DL (ref 65–99)
HBA1C MFR BLD: 6.2 % (ref 4.8–5.6)
HCT VFR BLD AUTO: 35.5 % (ref 37.5–51)
HGB BLD-MCNC: 11.4 G/DL (ref 13–17.7)
IMM GRANULOCYTES # BLD AUTO: 0.02 10*3/MM3 (ref 0–0.05)
IMM GRANULOCYTES NFR BLD AUTO: 0.3 % (ref 0–0.5)
LYMPHOCYTES # BLD AUTO: 1.41 10*3/MM3 (ref 0.7–3.1)
LYMPHOCYTES NFR BLD AUTO: 18.7 % (ref 19.6–45.3)
MCH RBC QN AUTO: 26.4 PG (ref 26.6–33)
MCHC RBC AUTO-ENTMCNC: 32.1 G/DL (ref 31.5–35.7)
MCV RBC AUTO: 82.2 FL (ref 79–97)
MONOCYTES # BLD AUTO: 0.51 10*3/MM3 (ref 0.1–0.9)
MONOCYTES NFR BLD AUTO: 6.8 % (ref 5–12)
NEUTROPHILS NFR BLD AUTO: 5.48 10*3/MM3 (ref 1.7–7)
NEUTROPHILS NFR BLD AUTO: 72.5 % (ref 42.7–76)
NRBC BLD AUTO-RTO: 0 /100 WBC (ref 0–0.2)
PLATELET # BLD AUTO: 173 10*3/MM3 (ref 140–450)
PMV BLD AUTO: 11.1 FL (ref 6–12)
POTASSIUM SERPL-SCNC: 4.5 MMOL/L (ref 3.5–5.2)
PROT SERPL-MCNC: 6 G/DL (ref 6–8.5)
RBC # BLD AUTO: 4.32 10*6/MM3 (ref 4.14–5.8)
SODIUM SERPL-SCNC: 139 MMOL/L (ref 136–145)
WBC NRBC COR # BLD: 7.55 10*3/MM3 (ref 3.4–10.8)

## 2023-09-29 PROCEDURE — 85025 COMPLETE CBC W/AUTO DIFF WBC: CPT | Performed by: INTERNAL MEDICINE

## 2023-09-29 PROCEDURE — 1159F MED LIST DOCD IN RCRD: CPT | Performed by: INTERNAL MEDICINE

## 2023-09-29 PROCEDURE — 90662 IIV NO PRSV INCREASED AG IM: CPT | Performed by: INTERNAL MEDICINE

## 2023-09-29 PROCEDURE — 1160F RVW MEDS BY RX/DR IN RCRD: CPT | Performed by: INTERNAL MEDICINE

## 2023-09-29 PROCEDURE — 83036 HEMOGLOBIN GLYCOSYLATED A1C: CPT | Performed by: INTERNAL MEDICINE

## 2023-09-29 PROCEDURE — G0008 ADMIN INFLUENZA VIRUS VAC: HCPCS | Performed by: INTERNAL MEDICINE

## 2023-09-29 PROCEDURE — 80053 COMPREHEN METABOLIC PANEL: CPT | Performed by: INTERNAL MEDICINE

## 2023-09-29 PROCEDURE — 99214 OFFICE O/P EST MOD 30 MIN: CPT | Performed by: INTERNAL MEDICINE

## 2023-09-29 PROCEDURE — 36415 COLL VENOUS BLD VENIPUNCTURE: CPT | Performed by: INTERNAL MEDICINE

## 2023-09-29 NOTE — PROGRESS NOTES
Subjective   Jim Romero is a 89 y.o. male.     Chief Complaint   Patient presents with    Heartburn    Osteoporosis    igt    Fatigue     Less energy, tiredness         Heartburn  He complains of abdominal pain ( heartburn). He reports no chest pain, no coughing or no wheezing. This is a chronic problem. Associated symptoms include fatigue.   Osteoporosis  This is a chronic problem. Associated symptoms include abdominal pain ( heartburn), arthralgias ( left shoulder pain), fatigue and myalgias. Pertinent negatives include no chest pain or coughing.   Fatigue  This is a chronic problem. Associated symptoms include abdominal pain ( heartburn), arthralgias ( left shoulder pain), fatigue and myalgias. Pertinent negatives include no chest pain or coughing.   Hyperglycemia  This is a chronic problem. The current episode started more than 1 year ago. Associated symptoms include abdominal pain ( heartburn), arthralgias ( left shoulder pain), fatigue and myalgias. Pertinent negatives include no chest pain or coughing.   Pain  This is a chronic problem. The current episode started more than 1 month ago. The problem has been unchanged. Associated symptoms include abdominal pain ( heartburn), arthralgias ( left shoulder pain), fatigue and myalgias. Pertinent negatives include no chest pain or coughing.   Arthritis  Presents for follow-up visit. The symptoms have been stable. Affected locations include the right knee, left knee, left DIP and right hip. Associated symptoms include fatigue. Pertinent negatives include no diarrhea.      The following portions of the patient's history were reviewed and updated as appropriate: allergies, current medications, past social history and problem list.    Outpatient Medications Marked as Taking for the 9/29/23 encounter (Office Visit) with Reji Gilman MD   Medication Sig Dispense Refill    Aspirin Low Dose 81 MG EC tablet TAKE ONE TABLET BY MOUTH DAILY 90 tablet 3    calcium  carbonate-cholecalciferol 500-400 MG-UNIT tablet tablet Take 1 tablet by mouth Daily.      isosorbide mononitrate (IMDUR) 30 MG 24 hr tablet Take 1 tablet by mouth Daily. 90 tablet 3    metoprolol succinate XL (TOPROL-XL) 25 MG 24 hr tablet Take 1 tablet by mouth Daily for 30 days. 90 tablet 3    pantoprazole (PROTONIX) 40 MG EC tablet TAKE ONE TABLET BY MOUTH DAILY 90 tablet 2    pramipexole (MIRAPEX) 1.5 MG tablet TAKE ONE TABLET BY MOUTH ONCE NIGHTLY 90 tablet 1    tamsulosin (FLOMAX) 0.4 MG capsule 24 hr capsule TAKE ONE CAPSULE BY MOUTH TWICE A  capsule 1       Review of Systems   Constitutional:  Positive for fatigue.   Respiratory:  Negative for cough, shortness of breath and wheezing.    Cardiovascular:  Negative for chest pain, palpitations and leg swelling.   Gastrointestinal:  Positive for abdominal pain ( heartburn). Negative for constipation and diarrhea.   Musculoskeletal:  Positive for arthralgias ( left shoulder pain), back pain and myalgias.   Psychiatric/Behavioral:  Nervous/anxious:  .      Objective   Vitals:    09/29/23 1055   BP: 100/60   Pulse: 66   Resp: 16   Temp: 96.3 °F (35.7 °C)   SpO2: 98%          09/29/23  1055   Weight: 76.8 kg (169 lb 4.8 oz)    [unfilled]  Body mass index is 22.96 kg/m².      Physical Exam   Constitutional: He appears well-developed.   Neck: No thyromegaly present.   Cardiovascular: Normal rate, regular rhythm and normal heart sounds. Exam reveals no gallop.   No murmur heard.  Crescendo decrescendo murmur is present with a grade of 2/6.  Grade 1-2 WAGNER at the aortic area   Pulmonary/Chest: Effort normal and breath sounds normal. No respiratory distress. He has no wheezes. He has no rales.   Abdominal: Soft. Normal appearance and bowel sounds are normal. He exhibits no mass. There is no abdominal tenderness. There is no guarding.   Neurological: He is alert.       Problems Addressed this Visit          Endocrine and Metabolic    IGT (impaired glucose  tolerance) - Primary (Chronic)    Relevant Orders    Hemoglobin A1c       Gastrointestinal Abdominal     GERD (gastroesophageal reflux disease) (Chronic)       Neuro    RLS (restless legs syndrome) (Chronic)       Symptoms and Signs    Chronic fatigue     Other Visit Diagnoses       Medication management        Relevant Orders    CBC & Differential    Comprehensive Metabolic Panel          Diagnoses         Codes Comments    IGT (impaired glucose tolerance)    -  Primary ICD-10-CM: R73.02  ICD-9-CM: 790.22     Gastroesophageal reflux disease, unspecified whether esophagitis present     ICD-10-CM: K21.9  ICD-9-CM: 530.81     RLS (restless legs syndrome)     ICD-10-CM: G25.81  ICD-9-CM: 333.94     Chronic fatigue     ICD-10-CM: R53.82  ICD-9-CM: 780.79     Medication management     ICD-10-CM: Z79.899  ICD-9-CM: V58.69           Assessment & Plan   In for recheck of IGT, RLS, GERD and osteoporosis today September 2023.  Previous history of PMR.   He started on prednisone on July 9, 2018.  Very mild aortic stenosis.  No further ache in the lower back, hips, knees, thighs.  We will check glucose, A1c today every 6 months.  Annual lab work today September 2023 including CBC, CMP, A1c.  Will recheck again in 6 months. Annual wellness visit is due December 2023.  Restless legs do great as long as he takes his Requip.  His big issues today are chronic fatigue as well as some loose skin in his neck that bothers him.  We would take plastic surgery to improve that and suggested that would not be in his best interest.  He remains on Imdur 30 mg daily along with metoprolol XL 25 mg daily for his heart and those are reviewed today.  Apparently Dr. Booker felt the Imdur could be discontinued.  There was a regadenoson stress test done in November 2022 which showed a fixed inferior apical defect.  TTE was unremarkable so its not even clear that there was an old MI or whether this was technical.  Regardless no further follow-up was  felt to be needed at that time.  Metoprolol was started along with low-dose aspirin to be on the safe side.  He was cleared for surgery for his cochlear implant.  Flu shot updated today.  He has a cochlear implant planned for 12/6/2023.  If he plans to get that done he should check into appropriate immunizations to include coverage for spinal fluid leak that may include meningococcus.  The good is it is not a splenectomy so he would be able to get the shots pre or postop.  He is 3 HPP today.    The above information was reviewed again today 09/29/23.  It continues to be accurate as reflected above and is unchanged.  History, physical and review of systems all reviewed and are unchanged.  Medications were reviewed today and continue the current dosing.         Dragon disclaimer:   Much of this encounter note is an electronic transcription/translation of spoken language to printed text. The electronic translation of spoken language may permit erroneous, or at times, nonsensical words or phrases to be inadvertently transcribed; Although I have reviewed the note for such errors, some may still exist.

## 2023-09-30 PROBLEM — Q21.12 PFO (PATENT FORAMEN OVALE): Status: ACTIVE | Noted: 2023-09-30

## 2023-10-02 ENCOUNTER — TELEPHONE (OUTPATIENT)
Dept: INTERNAL MEDICINE | Facility: CLINIC | Age: 88
End: 2023-10-02
Payer: MEDICARE

## 2023-10-02 NOTE — TELEPHONE ENCOUNTER
Spouse c/o attacks, toss head back, struggling for breathe. During these episodes he is groaning for his breath. Occurs every few days. Pt was advised to go to ER by hub but patient declines. Spoke to spouse, offered to get him in tomorrow 130, she will check with spouse to get him in & will call us. Otherwise advised to go to ER, but states episode from today has resolved.

## 2023-11-21 ENCOUNTER — TELEPHONE (OUTPATIENT)
Dept: INTERNAL MEDICINE | Facility: CLINIC | Age: 88
End: 2023-11-21

## 2023-11-21 NOTE — TELEPHONE ENCOUNTER
Pt reports muscle aches 4-5 days. No fever, some cough for 2 weeks. Wants to discuss PMR, scheduled OV tomorrow, but unable to do telehealth. Advised to wear mask.

## 2023-11-21 NOTE — TELEPHONE ENCOUNTER
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     PATIENT WANTS A CALL BACK.

## 2023-11-22 ENCOUNTER — TELEPHONE (OUTPATIENT)
Dept: INTERNAL MEDICINE | Facility: CLINIC | Age: 88
End: 2023-11-22

## 2023-11-22 ENCOUNTER — HOSPITAL ENCOUNTER (OUTPATIENT)
Dept: GENERAL RADIOLOGY | Facility: HOSPITAL | Age: 88
Discharge: HOME | End: 2023-11-22
Admitting: INTERNAL MEDICINE
Payer: MEDICARE

## 2023-11-22 ENCOUNTER — OFFICE VISIT (OUTPATIENT)
Dept: INTERNAL MEDICINE | Facility: CLINIC | Age: 88
End: 2023-11-22
Payer: MEDICARE

## 2023-11-22 ENCOUNTER — LAB (OUTPATIENT)
Dept: LAB | Facility: HOSPITAL | Age: 88
End: 2023-11-22
Payer: MEDICARE

## 2023-11-22 VITALS
HEART RATE: 77 BPM | RESPIRATION RATE: 16 BRPM | OXYGEN SATURATION: 98 % | SYSTOLIC BLOOD PRESSURE: 132 MMHG | BODY MASS INDEX: 22.94 KG/M2 | WEIGHT: 169.4 LBS | TEMPERATURE: 97.5 F | HEIGHT: 72 IN | DIASTOLIC BLOOD PRESSURE: 62 MMHG

## 2023-11-22 DIAGNOSIS — M35.3 PMR (POLYMYALGIA RHEUMATICA): ICD-10-CM

## 2023-11-22 DIAGNOSIS — R63.4 WEIGHT LOSS: ICD-10-CM

## 2023-11-22 DIAGNOSIS — M79.10 MYALGIA: Primary | ICD-10-CM

## 2023-11-22 LAB
ALBUMIN SERPL-MCNC: 3.8 G/DL (ref 3.5–5.2)
ALBUMIN/GLOB SERPL: 1.6 G/DL
ALP SERPL-CCNC: 56 U/L (ref 39–117)
ALT SERPL W P-5'-P-CCNC: 12 U/L (ref 1–41)
ANION GAP SERPL CALCULATED.3IONS-SCNC: 7.4 MMOL/L (ref 5–15)
AST SERPL-CCNC: 15 U/L (ref 1–40)
BASOPHILS # BLD AUTO: 0.05 10*3/MM3 (ref 0–0.2)
BASOPHILS NFR BLD AUTO: 0.6 % (ref 0–1.5)
BILIRUB SERPL-MCNC: 0.6 MG/DL (ref 0–1.2)
BUN SERPL-MCNC: 24 MG/DL (ref 8–23)
BUN/CREAT SERPL: 21.4 (ref 7–25)
CALCIUM SPEC-SCNC: 9.1 MG/DL (ref 8.2–9.6)
CHLORIDE SERPL-SCNC: 105 MMOL/L (ref 98–107)
CO2 SERPL-SCNC: 27.6 MMOL/L (ref 22–29)
CREAT SERPL-MCNC: 1.12 MG/DL (ref 0.76–1.27)
DEPRECATED RDW RBC AUTO: 41.8 FL (ref 37–54)
EGFRCR SERPLBLD CKD-EPI 2021: 61.6 ML/MIN/1.73
EOSINOPHIL # BLD AUTO: 0.15 10*3/MM3 (ref 0–0.4)
EOSINOPHIL NFR BLD AUTO: 1.9 % (ref 0.3–6.2)
ERYTHROCYTE [DISTWIDTH] IN BLOOD BY AUTOMATED COUNT: 14.3 % (ref 12.3–15.4)
ERYTHROCYTE [SEDIMENTATION RATE] IN BLOOD: 3 MM/HR (ref 0–20)
GLOBULIN UR ELPH-MCNC: 2.4 GM/DL
GLUCOSE SERPL-MCNC: 90 MG/DL (ref 65–99)
HCT VFR BLD AUTO: 33.6 % (ref 37.5–51)
HGB BLD-MCNC: 10.9 G/DL (ref 13–17.7)
IMM GRANULOCYTES # BLD AUTO: 0.04 10*3/MM3 (ref 0–0.05)
IMM GRANULOCYTES NFR BLD AUTO: 0.5 % (ref 0–0.5)
LYMPHOCYTES # BLD AUTO: 1.57 10*3/MM3 (ref 0.7–3.1)
LYMPHOCYTES NFR BLD AUTO: 20.3 % (ref 19.6–45.3)
MCH RBC QN AUTO: 26.6 PG (ref 26.6–33)
MCHC RBC AUTO-ENTMCNC: 32.4 G/DL (ref 31.5–35.7)
MCV RBC AUTO: 82 FL (ref 79–97)
MONOCYTES # BLD AUTO: 0.61 10*3/MM3 (ref 0.1–0.9)
MONOCYTES NFR BLD AUTO: 7.9 % (ref 5–12)
NEUTROPHILS NFR BLD AUTO: 5.33 10*3/MM3 (ref 1.7–7)
NEUTROPHILS NFR BLD AUTO: 68.8 % (ref 42.7–76)
NRBC BLD AUTO-RTO: 0 /100 WBC (ref 0–0.2)
PLATELET # BLD AUTO: 199 10*3/MM3 (ref 140–450)
PMV BLD AUTO: 11.1 FL (ref 6–12)
POTASSIUM SERPL-SCNC: 4.7 MMOL/L (ref 3.5–5.2)
PROT SERPL-MCNC: 6.2 G/DL (ref 6–8.5)
RBC # BLD AUTO: 4.1 10*6/MM3 (ref 4.14–5.8)
SODIUM SERPL-SCNC: 140 MMOL/L (ref 136–145)
T4 FREE SERPL-MCNC: 1.2 NG/DL (ref 0.93–1.7)
TSH SERPL DL<=0.05 MIU/L-ACNC: 0.8 UIU/ML (ref 0.27–4.2)
WBC NRBC COR # BLD AUTO: 7.75 10*3/MM3 (ref 3.4–10.8)

## 2023-11-22 PROCEDURE — 85652 RBC SED RATE AUTOMATED: CPT | Performed by: INTERNAL MEDICINE

## 2023-11-22 PROCEDURE — 84681 ASSAY OF C-PEPTIDE: CPT | Performed by: INTERNAL MEDICINE

## 2023-11-22 PROCEDURE — 84443 ASSAY THYROID STIM HORMONE: CPT | Performed by: INTERNAL MEDICINE

## 2023-11-22 PROCEDURE — 36415 COLL VENOUS BLD VENIPUNCTURE: CPT | Performed by: INTERNAL MEDICINE

## 2023-11-22 PROCEDURE — 80053 COMPREHEN METABOLIC PANEL: CPT | Performed by: INTERNAL MEDICINE

## 2023-11-22 PROCEDURE — 71046 X-RAY EXAM CHEST 2 VIEWS: CPT

## 2023-11-22 PROCEDURE — 84439 ASSAY OF FREE THYROXINE: CPT | Performed by: INTERNAL MEDICINE

## 2023-11-22 PROCEDURE — 85025 COMPLETE CBC W/AUTO DIFF WBC: CPT | Performed by: INTERNAL MEDICINE

## 2023-11-22 PROCEDURE — 99214 OFFICE O/P EST MOD 30 MIN: CPT | Performed by: INTERNAL MEDICINE

## 2023-11-22 RX ORDER — METOPROLOL SUCCINATE 25 MG/1
25 TABLET, EXTENDED RELEASE ORAL DAILY
COMMUNITY

## 2023-11-22 NOTE — TELEPHONE ENCOUNTER
Caller: Jim Romero    Relationship: Self    Best call back number: [phone]    What is the best time to reach you: ANYTIME     Who are you requesting to speak with (clinical staff, provider,  specific staff member): CLINICAL STAFF     Do you know the name of the person who called: SELF     What was the call regarding: PATIENT CALLED AND STATED HE IS IN PAIN AND WOULD LIKE TO KNOW HOW MUCH PREDNISONE TO TAKE. PAIN HAS GOTTEN WORSE SINCE HE LEFT YOUR OFFICE TODAY. PLEASE CALL     Is it okay if the provider responds through MyChart: NO

## 2023-11-22 NOTE — PROGRESS NOTES
Subjective   Jim Romero is a 89 y.o. male.     Chief Complaint   Patient presents with    Generalized Body Aches     Discuss PMR    Leg Pain     Buttocks worse, legs & upper body x 1 week    Cough     Started about X 1 month ago, but resolved         History of Present Illness  In today with 1 week of proximal myalgias.  Hip and shoulder girdle mainly.  Buttocks is the worst.  He had polymyalgia rheumatica in 2018 and the symptoms are very much like the symptoms then.  He said no jaw claudication.  No significant headaches.  He had a cough 1 month ago but that is almost gone.  Leg Pain   The incident occurred 5 to 7 days ago. There was no injury mechanism.   Cough  This is a new problem. The current episode started 1 to 4 weeks ago. Associated symptoms include myalgias. Pertinent negatives include no chills or fever.        The following portions of the patient's history were reviewed and updated as appropriate: allergies, current medications, past social history and problem list.    Outpatient Medications Marked as Taking for the 11/22/23 encounter (Office Visit) with Reji Gilman MD   Medication Sig Dispense Refill    Aspirin Low Dose 81 MG EC tablet TAKE ONE TABLET BY MOUTH DAILY 90 tablet 3    calcium carbonate-cholecalciferol 500-400 MG-UNIT tablet tablet Take 1 tablet by mouth Daily.      isosorbide mononitrate (IMDUR) 30 MG 24 hr tablet Take 1 tablet by mouth Daily. 90 tablet 3    metoprolol succinate XL (TOPROL-XL) 25 MG 24 hr tablet Take 1 tablet by mouth Daily.      pantoprazole (PROTONIX) 40 MG EC tablet TAKE ONE TABLET BY MOUTH DAILY 90 tablet 2    pramipexole (MIRAPEX) 1.5 MG tablet TAKE ONE TABLET BY MOUTH ONCE NIGHTLY 90 tablet 1    tamsulosin (FLOMAX) 0.4 MG capsule 24 hr capsule TAKE ONE CAPSULE BY MOUTH TWICE A  capsule 1       Review of Systems   Constitutional:  Negative for chills and fever.   Respiratory:  Positive for cough.    Musculoskeletal:  Positive for arthralgias and  myalgias.       Objective   Vitals:    11/22/23 1103   BP: 132/62   Pulse: 77   Resp: 16   Temp: 97.5 °F (36.4 °C)   SpO2: 98%      Wt Readings from Last 3 Encounters:   11/22/23 76.8 kg (169 lb 6.4 oz)   09/29/23 76.8 kg (169 lb 4.8 oz)   07/10/23 77.9 kg (171 lb 12.8 oz)    Body mass index is 22.97 kg/m².      Physical Exam  Constitutional:       Appearance: Normal appearance. He is well-developed.   Neck:      Thyroid: No thyromegaly.   Cardiovascular:      Rate and Rhythm: Normal rate and regular rhythm.      Heart sounds: Murmur heard.      Crescendo decrescendo systolic murmur is present with a grade of 2/6.      No gallop.      Comments: Grade 2 WAGNER along the LSB  Pulmonary:      Effort: Pulmonary effort is normal. No respiratory distress.      Breath sounds: Normal breath sounds. No wheezing or rales.   Abdominal:      General: Bowel sounds are normal.      Palpations: Abdomen is soft. There is no mass.      Tenderness: There is no abdominal tenderness. There is no guarding.   Musculoskeletal:         General: Tenderness (Thighs and shoulders) present.   Neurological:      Mental Status: He is alert.           Problems Addressed this Visit    None  Visit Diagnoses       Myalgia    -  Primary    Relevant Orders    CBC & Differential    Sedimentation Rate    C-Peptide    TSH    T4, Free    XR Chest 2 View    Weight loss        Relevant Orders    CBC & Differential    Sedimentation Rate    C-Peptide    TSH    T4, Free    XR Chest 2 View    PMR (polymyalgia rheumatica)              Diagnoses         Codes Comments    Myalgia    -  Primary ICD-10-CM: M79.10  ICD-9-CM: 729.1     Weight loss     ICD-10-CM: R63.4  ICD-9-CM: 783.21     PMR (polymyalgia rheumatica)     ICD-10-CM: M35.3  ICD-9-CM: 725           Assessment & Plan   In today with 1 week of proximal myalgias in the hip and shoulder girdle distribution.  It is very much like his previous PMR that he had in 2018.  He has been off of treatment for about 2  years and in complete remission.  He is also lost 20 to 25 pounds of weight recently over the past 6 months or so.  Will need to recheck a sed rate and CRP today.  Also check a chest x-ray, CBC, CMP, TSH and free T4.  Will need to make sure we rule out an occult malignancy as well as a recurrence of his PMR which seems a bit unusual at this late date.    The above information was reviewed again today 11/22/23.  It continues to be accurate as reflected above and is unchanged.  History, physical and review of systems all reviewed and are unchanged.  Medications were reviewed today and continue the current dosing.               Dragon disclaimer:   Part of this note may be an electronic transcription/translation of spoken language to printed text using the Dragon Dictation System.

## 2023-11-24 LAB — C PEPTIDE SERPL-MCNC: 3.4 NG/ML (ref 1.1–4.4)

## 2023-11-27 ENCOUNTER — PATIENT MESSAGE (OUTPATIENT)
Dept: INTERNAL MEDICINE | Facility: CLINIC | Age: 88
End: 2023-11-27
Payer: MEDICARE

## 2023-11-27 DIAGNOSIS — M79.10 MYALGIA: ICD-10-CM

## 2023-11-27 DIAGNOSIS — R63.4 WEIGHT LOSS: Primary | ICD-10-CM

## 2023-11-27 RX ORDER — GABAPENTIN 100 MG/1
100 CAPSULE ORAL NIGHTLY
Qty: 100 CAPSULE | Refills: 0 | Status: SHIPPED | OUTPATIENT
Start: 2023-11-27

## 2023-11-27 NOTE — TELEPHONE ENCOUNTER
Caller: Jmi Romero    Relationship: Self    Best call back number:     Jim Romero (Self) [phone] (Mobile)       What was the call regarding: PATIENT HAS A MESSAGE IN [employer] REGARDING PAIN AND REQUESTING CORTISONE SHOT FOR IT     CAN YOU CALL PATIENT AND LET HIM KNOW WHAT HE CAN DO OR IF YOU CAN DO THE INJECTION?     Is it okay if the provider responds through [employer]: CALL TO DISCUSS/ CALL DROPPED DURING XFER

## 2023-11-27 NOTE — TELEPHONE ENCOUNTER
Dr Gilman     10-15 mg of prednisone had no affect on my pain, nor did aspirin or tylenol. Pain has gotten pretty severe, particularly in my left buttock. cannot sit on it. Could a shot of cortisone help? Also, apparently my prostate has swollen and is restricting my flow and causing frequent urination.

## 2023-11-27 NOTE — TELEPHONE ENCOUNTER
We should check a CPK and aldolase.  He should stop his prednisone.  Right now we do not have a diagnosis for him but we are working on it.  This does not appear to be PMR as far as I am concerned.

## 2023-11-28 ENCOUNTER — LAB (OUTPATIENT)
Dept: LAB | Facility: HOSPITAL | Age: 88
End: 2023-11-28
Payer: MEDICARE

## 2023-11-28 LAB — CK SERPL-CCNC: 50 U/L (ref 20–200)

## 2023-11-28 PROCEDURE — 36415 COLL VENOUS BLD VENIPUNCTURE: CPT | Performed by: INTERNAL MEDICINE

## 2023-11-28 PROCEDURE — 82085 ASSAY OF ALDOLASE: CPT | Performed by: INTERNAL MEDICINE

## 2023-11-28 PROCEDURE — 82550 ASSAY OF CK (CPK): CPT | Performed by: INTERNAL MEDICINE

## 2023-11-29 LAB — ALDOLASE SERPL-CCNC: 3 U/L (ref 3.3–10.3)

## 2023-12-04 ENCOUNTER — TELEPHONE (OUTPATIENT)
Dept: INTERNAL MEDICINE | Facility: CLINIC | Age: 88
End: 2023-12-04
Payer: MEDICARE

## 2023-12-04 DIAGNOSIS — I71.43 INFRARENAL ABDOMINAL AORTIC ANEURYSM (AAA) WITHOUT RUPTURE: Primary | ICD-10-CM

## 2023-12-04 DIAGNOSIS — R63.4 WEIGHT LOSS: ICD-10-CM

## 2023-12-04 DIAGNOSIS — M79.10 MYALGIA: ICD-10-CM

## 2023-12-04 RX ORDER — TAMSULOSIN HYDROCHLORIDE 0.4 MG/1
1 CAPSULE ORAL 2 TIMES DAILY
Qty: 180 CAPSULE | Refills: 1 | Status: SHIPPED | OUTPATIENT
Start: 2023-12-04

## 2023-12-04 NOTE — TELEPHONE ENCOUNTER
PATIENT CALLED IN REGARDS TO HIS TEST RESULTS FROM [employer].    PLEASE CALL [phone]   Patient Seen in: [facility]    History   Patient presents with:  Back Pain (musculoskeletal)  Swelling Edema (cardiovascular, metabolic)    Stated Complaint: LOWER BACK PAIN    HPI    Patient presents with low back and right leg 1522 18   Temp 02/03/19 1522 98.4 °F (36.9 °C)   Temp src 02/03/19 1522 Oral   SpO2 02/03/19 1522 100 %   O2 Device 02/03/19 1505 None (Room air)       Current:/50   Pulse 76   Temp 98.4 °F (36.9 °C) (Oral)   Resp 18   Ht 157.5 cm (5' 2\")   Wt 72.6 her pain with some improvement. MDM   The patient's pain is consistent with sciatica. She is neurologically intact and appears comfortable at this time. She has already taken a course of prednisone without much improvement.   I will give her a prescrip

## 2023-12-04 NOTE — TELEPHONE ENCOUNTER
Informed patient we have called [facility] for result status and they are sill not available yet, but we will call as soon as we receive them. Patient verbally understood.

## 2023-12-05 PROBLEM — I71.43 INFRARENAL ABDOMINAL AORTIC ANEURYSM (AAA) WITHOUT RUPTURE: Status: ACTIVE | Noted: 2023-12-05

## 2023-12-05 NOTE — TELEPHONE ENCOUNTER
Dr. 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 Please advise.

## 2023-12-05 NOTE — TELEPHONE ENCOUNTER
Call patient.  CTOf the chest abdomen and pelvis shows a focal right lung pneumonia that is likely not causing symptoms.  He does have a aneurysm of the aorta which is also not likely causing symptoms.  This will need to be monitored however.  If it reaches 6 cm we will need to consider a procedure to stent the aneurysm.  Will plan to repeat a CT in 6 months.  I still do not have an explanation for his muscle aches and weight loss.

## 2023-12-06 ENCOUNTER — TELEPHONE (OUTPATIENT)
Dept: INTERNAL MEDICINE | Facility: CLINIC | Age: 88
End: 2023-12-06

## 2023-12-06 NOTE — TELEPHONE ENCOUNTER
Spouse informed & verbalized understanding. Patient is still experiencing pain, what is the next step to getting a diagnosis. Also, would rheumatology referral would be appropriate or nay other specialist. Please advise.    Ct order placed today.

## 2023-12-06 NOTE — TELEPHONE ENCOUNTER
He is on gabapentin 100 mg daily.  Increase to 200 mg daily.  In one week he can increase to 300 mg daily and so forth.  We can go a lot higher it needed.

## 2023-12-06 NOTE — TELEPHONE ENCOUNTER
Caller: Jim Romero    Relationship: Self    Best call back number: [phone]     What was the call regarding: PATIENT STATES THE     gabapentin (NEURONTIN) 100 MG capsule   IS NOT HELPING. HE WANTS TO KNOW IF HE CAN HAVE A STRONGER PAIN MEDICATION OR A CORTISONE SHOT. PLEASE CALL.

## 2023-12-07 ENCOUNTER — TELEPHONE (OUTPATIENT)
Dept: INTERNAL MEDICINE | Facility: CLINIC | Age: 88
End: 2023-12-07

## 2023-12-07 RX ORDER — GABAPENTIN 100 MG/1
200 CAPSULE ORAL DAILY
COMMUNITY

## 2023-12-07 NOTE — TELEPHONE ENCOUNTER
Caller: Jim Romero    Relationship: Self    Best call back number: [phone]      What specialty or service is being requested: RHEUMATOLOGY     What is the provider, practice or medical service name: DR. JOHNSON    What is the office phone number: FAX NUMBER [phone]    Any additional details: PLEASE ADVISE PATIENT ASAP WHEN THIS HAS BEEN SUBMITTED.

## 2023-12-07 NOTE — TELEPHONE ENCOUNTER
Referral placed today. Spouse request referral to Dr. May who she has seen before. Dr May is with [facility]

## 2023-12-28 ENCOUNTER — TELEPHONE (OUTPATIENT)
Dept: INTERNAL MEDICINE | Facility: CLINIC | Age: 88
End: 2023-12-28
Payer: MEDICARE

## 2023-12-28 DIAGNOSIS — I71.43 INFRARENAL ABDOMINAL AORTIC ANEURYSM (AAA) WITHOUT RUPTURE: Primary | ICD-10-CM

## 2023-12-28 NOTE — TELEPHONE ENCOUNTER
We know about the aneurysm.  It is 5.6 cm on his CAT scan 3 weeks ago.  That is a much more accurate measurement.  This could certainly be fixed at any time or he could wait till he gets into the 6.0 cm range.  We are now fixing most of these with a stent rather than an open operation.  If he would like referral to a vascular surgeon that would be very appropriate.  Let me know if he would like a referral.  That would be to Dr. Austyn Gallegos.

## 2023-12-28 NOTE — TELEPHONE ENCOUNTER
Patient saw Dr. May ([facility]) yesterday & got an Xray of LS, that showed AAA of 6.4 cm. Please advise.    Pt had CT Abd/Pelvis on 11/30/23.

## 2024-01-02 RX ORDER — METOPROLOL SUCCINATE 25 MG/1
25 TABLET, EXTENDED RELEASE ORAL DAILY
Qty: 90 TABLET | Refills: 3 | Status: SHIPPED | OUTPATIENT
Start: 2024-01-02

## 2024-01-02 RX ORDER — ISOSORBIDE MONONITRATE 30 MG/1
30 TABLET, EXTENDED RELEASE ORAL DAILY
Qty: 90 TABLET | Refills: 3 | Status: SHIPPED | OUTPATIENT
Start: 2024-01-02

## 2024-01-02 RX ORDER — PRAMIPEXOLE DIHYDROCHLORIDE 1.5 MG/1
TABLET ORAL
Qty: 90 TABLET | Refills: 1 | Status: SHIPPED | OUTPATIENT
Start: 2024-01-02

## 2024-01-25 ENCOUNTER — TELEPHONE (OUTPATIENT)
Dept: CARDIOLOGY | Facility: CLINIC | Age: 89
End: 2024-01-25
Payer: MEDICARE

## 2024-01-25 ENCOUNTER — OFFICE VISIT (OUTPATIENT)
Age: 89
End: 2024-01-25
Payer: MEDICARE

## 2024-01-25 VITALS
BODY MASS INDEX: 23.16 KG/M2 | DIASTOLIC BLOOD PRESSURE: 60 MMHG | WEIGHT: 171 LBS | HEIGHT: 72 IN | HEART RATE: 64 BPM | SYSTOLIC BLOOD PRESSURE: 112 MMHG

## 2024-01-25 DIAGNOSIS — R07.2 PRECORDIAL PAIN: ICD-10-CM

## 2024-01-25 DIAGNOSIS — I20.0 UNSTABLE ANGINA: ICD-10-CM

## 2024-01-25 DIAGNOSIS — I35.0 AORTIC STENOSIS, MILD: Primary | ICD-10-CM

## 2024-01-25 PROCEDURE — 99214 OFFICE O/P EST MOD 30 MIN: CPT | Performed by: INTERNAL MEDICINE

## 2024-01-25 PROCEDURE — 93000 ELECTROCARDIOGRAM COMPLETE: CPT | Performed by: INTERNAL MEDICINE

## 2024-01-25 NOTE — H&P (VIEW-ONLY)
Jim Romero  3/17/1931  Date of Office Visit: 01/25/24  Encounter Provider: Froylan Booker MD  Place of Service: [facility] CARDIOLOG[facility]      CHIEF COMPLAINT:  Chest pain  Abnormal stress test  Preoperative restratification     HISTORY OF PRESENT ILLNESS:  89-year-old male patient who presented to the emergency room on 11/16/2022 with report of lower chest and epigastric discomfort.  Reportedly this was when he was walking and then raking leaves.  It is relieved by rest.  He denied rest pain.  His cardiac biomarkers were negative.  His electrocardiogram in the ER was reviewed and showed a sinus rhythm with just nonspecific ST-T wave abnormalities.  His transthoracic echocardiogram showed normal left ventricular size and systolic function and no wall motion abnormalities.  There was mild aortic valve stenosis and mild to moderate tricuspid valve regurgitation along with mild mitral valve regurgitation.  There was a very small pericardial effusion also documented.  He underwent perfusion stress test which is listed below.  I have reviewed the images and I do not agree with the read.  There looks to be a fixed area of decreased perfusion in the inferior wall and septum towards that is distal and apical.     We attempted medical management with aspirin, metoprolol and Imdur, however he had a headache with Imdur.  He states that he has had more chest pain as of late that is burning, central and nonradiating.  It goes away with rest but has been getting more intense and more frequent.  It is also requiring less activity to bring this on      Review of Systems   Constitutional: Negative for fever and malaise/fatigue.   HENT:  Negative for nosebleeds and sore throat.    Eyes:  Negative for blurred vision and double vision.   Cardiovascular:  Negative for chest pain, claudication, palpitations and syncope.   Respiratory:  Negative for cough, shortness of breath and snoring.   "  Endocrine: Negative for cold intolerance, heat intolerance and polydipsia.   Skin:  Negative for itching, poor wound healing and rash.   Musculoskeletal:  Negative for joint pain, joint swelling, muscle weakness and myalgias.   Gastrointestinal:  Negative for abdominal pain, melena, nausea and vomiting.   Neurological:  Negative for light-headedness, loss of balance, seizures, vertigo and weakness.   Psychiatric/Behavioral:  Negative for altered mental status and depression.        Past Medical History:   Diagnosis Date    Benign prostatic hyperplasia     GERD (gastroesophageal reflux disease)     Medication management     RLS (restless legs syndrome)     Sciatica        The following portions of the patient's history were reviewed and updated as appropriate: Social history , Family history and Surgical history     Current Outpatient Medications on File Prior to Visit   Medication Sig Dispense Refill    Aspirin Low Dose 81 MG EC tablet TAKE ONE TABLET BY MOUTH DAILY 90 tablet 3    calcium carbonate-cholecalciferol 500-400 MG-UNIT tablet tablet Take 1 tablet by mouth Daily.      gabapentin (NEURONTIN) 100 MG capsule Take 2 capsules by mouth Daily. Can increase by 100 mg every week. Max is 600mg      isosorbide mononitrate (IMDUR) 30 MG 24 hr tablet TAKE ONE TABLET BY MOUTH DAILY 90 tablet 3    metoprolol succinate XL (TOPROL-XL) 25 MG 24 hr tablet TAKE ONE TABLET BY MOUTH DAILY 90 tablet 3    pantoprazole (PROTONIX) 40 MG EC tablet TAKE ONE TABLET BY MOUTH DAILY 90 tablet 2    pramipexole (MIRAPEX) 1.5 MG tablet TAKE ONE TABLET BY MOUTH ONCE NIGHTLY 90 tablet 1    tamsulosin (FLOMAX) 0.4 MG capsule 24 hr capsule TAKE 1 CAPSULE BY MOUTH TWICE A  capsule 1     No current facility-administered medications on file prior to visit.       No Known Allergies    Vitals:    01/25/24 1044   BP: 112/60   Pulse: 64   Weight: 77.6 kg (171 lb)   Height: 182.9 cm (72\")     Body mass index is 23.19 kg/m².   Constitutional:  "      Appearance: Well-developed.   Eyes:      General: No scleral icterus.     Conjunctiva/sclera: Conjunctivae normal.   HENT:      Head: Normocephalic and atraumatic.   Neck:      Thyroid: No thyromegaly.      Vascular: Normal carotid pulses. No carotid bruit, hepatojugular reflux or JVD.      Trachea: No tracheal deviation.   Pulmonary:      Effort: No respiratory distress.      Breath sounds: Normal breath sounds. No decreased breath sounds. No wheezing. No rhonchi. No rales.   Chest:      Chest wall: Not tender to palpatation.   Cardiovascular:      Normal rate. Regular rhythm.      No gallop.    Pulses:     Carotid: 2+ bilaterally.     Radial: 2+ bilaterally.     Femoral: 2+ bilaterally.     Dorsalis pedis: 2+ bilaterally.     Posterior tibial: 2+ bilaterally.  Edema:     Peripheral edema absent.   Abdominal:      General: Bowel sounds are normal. There is no distension.      Palpations: Abdomen is soft.      Tenderness: There is no abdominal tenderness.   Musculoskeletal:         General: No deformity.      Cervical back: Normal range of motion and neck supple. Skin:     Findings: No erythema or rash.   Neurological:      Mental Status: Alert and oriented to person, place, and time.      Sensory: No sensory deficit.   Psychiatric:         Behavior: Behavior normal.            Lab Results   Component Value Date    WBC 7.75 11/22/2023    HGB 10.9 (L) 11/22/2023    HCT 33.6 (L) 11/22/2023    MCV 82.0 11/22/2023     11/22/2023       Lab Results   Component Value Date    GLUCOSE 90 11/22/2023    BUN 24 (H) 11/22/2023    CREATININE 1.12 11/22/2023    EGFRIFNONA 57 (L) 09/13/2021    EGFRIFAFRI 69 09/13/2021    BCR 21.4 11/22/2023    K 4.7 11/22/2023    CO2 27.6 11/22/2023    CALCIUM 9.1 11/22/2023    PROTENTOTREF 6.2 10/20/2022    ALBUMIN 3.8 11/22/2023    LABIL2 1.8 10/20/2022    AST 15 11/22/2023    ALT 12 11/22/2023       Lab Results   Component Value Date    GLUCOSE 90 11/22/2023    CALCIUM 9.1 11/22/2023      11/22/2023    K 4.7 11/22/2023    CO2 27.6 11/22/2023     11/22/2023    BUN 24 (H) 11/22/2023    CREATININE 1.12 11/22/2023    EGFRIFAFRI 69 09/13/2021    EGFRIFNONA 57 (L) 09/13/2021    BCR 21.4 11/22/2023    ANIONGAP 7.4 11/22/2023       Lab Results   Component Value Date    CHLPL 165 10/20/2022    TRIG 57 10/20/2022    HDL 51 10/20/2022     (H) 10/20/2022         ECG 12 Lead    Date/Time: 1/25/2024 11:02 AM  Performed by: Froylan Booker MD    Authorized by: Froylan Booker MD  Comparison: compared with previous ECG from 7/10/2023  Similar to previous ECG  Comparison to previous ECG: Patient no longer with short atrial runs  Rhythm: sinus rhythm  Rate: normal  QRS axis: normal  Comments: Incomplete right bundle branch block.           Results for orders placed during the hospital encounter of 11/15/22    Adult Transthoracic Echo Complete W/ Cont if Necessary Per Protocol    Interpretation Summary    Left ventricular systolic function is normal. Left ventricular ejection fraction appears to be 61 - 65%.    Left ventricular wall thickness is consistent with moderate concentric hypertrophy.    Left ventricular diastolic function is consistent with (grade I) impaired relaxation.    The right ventricular cavity is mildly dilated. Normal right ventricular systolic function noted.    The left atrial cavity is moderately dilated.    The interatrial septum appears redundant. The agitated saline study is positive with Valsalva, consistent with a small to moderate PFO    Mild to moderate aortic valve stenosis is present. Aortic valve maximum pressure gradient is 25 mmHg. Aortic valve mean pressure gradient is 15 mmHg.    Mild mitral valve regurgitation is present.    Mild to moderate tricuspid valve regurgitation is present    Calculated right ventricular systolic pressure from tricuspid regurgitation is 39 mmHg.    There is a small (<1cm) circumferential pericardial effusion. There is  no evidence of cardiac tamponade.    Stress    ST segment depression of 0.5 mm in the inferolateral leads was noted during stress (II, III, aVF, V5 and V6), beginning at 3 minutes of stress.    Myocardial perfusion imaging indicates a medium-sized, moderately severe area of ischemia located in the inferior wall and septal wall.    Left ventricular ejection fraction is normal (Calculated EF = 65%).    Impressions are consistent with a high risk study.      DISCUSSION/SUMMARY  89-year-old male with a medical history of chest pain, mild aortic valve stenosis, fixed inferoseptal to apical defect on stress test on my review, who initially presented to me with stable angina.  He denies any dyspnea on exertion, orthopnea or PND but is complaining of more frequent chest discomfort.  His chest pain is more intense, more frequent and consistent with unstable angina.  He has a previously abnormal stress test and has been on good medical management.    1.  Abnormal stress test: Inferoseptal fixed defect on my review  -Patient has more frequent chest pain consistent with unstable angina.  He has been on good medical therapy with aspirin, metoprolol and Imdur which she had to stop recently secondary to headaches.  -Continue metoprolol therapy at current dose.  Continue aspirin.  -I would recommend moving forward with coronary angiography.  He is aware of the risks of this procedure    2.  Mild aortic valve stenosis: Asymptomatic.  We will continue to follow.

## 2024-01-25 NOTE — TELEPHONE ENCOUNTER
"Caller: MICHELLE COATS     Relationship: SON    Best call back number: [phone]     What is your medical concern? 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, OR DOES SOMETHING HE SHOULDNT AND THAT NIGHT HE WILL COMPLAIN ABOUT SOB - PT WILL ALSO HAVE MOMENTS OF BEING PALE AND SWEATING AND CLAMMY AND HAVING TROUBLE GETTING HIS BREATH BUT THEN HE WILL LAY DOWN FOR 30 MIN AND GET UP SAYING HE FEELS FINE - PT SON CONCERNED THAT PT NOT BEING UP FRONT WITH ISSUES AND WANTS PROVIDER TO BE AWARE - PT SON STATES THIS HAS HAPPENED MULTIPLE TIMES OF THEM GETTING HIM HELP DURING AN EPISODE AND THEN REFUSING IT ONCE HE FEELS \"OKAY\" AGAIN - PT SON JUST WANTING TO LET OFFICE KNOW, HE ASKED TO SPEAK WITH NURSE IF ANYTHING MORE IS NEEDED -     How long has this issue been going on? A WHILE    Is your provider already aware of this issue? SOMEWHAT    Have you been treated for this issue? YES  "

## 2024-01-25 NOTE — H&P (VIEW-ONLY)
Jim Romero  3/17/1931  Date of Office Visit: 01/25/24  Encounter Provider: Froylan Booker MD  Place of Service: [facility] CARDIOLOG[facility]      CHIEF COMPLAINT:  Chest pain  Abnormal stress test  Preoperative restratification     HISTORY OF PRESENT ILLNESS:  89-year-old male patient who presented to the emergency room on 11/16/2022 with report of lower chest and epigastric discomfort.  Reportedly this was when he was walking and then raking leaves.  It is relieved by rest.  He denied rest pain.  His cardiac biomarkers were negative.  His electrocardiogram in the ER was reviewed and showed a sinus rhythm with just nonspecific ST-T wave abnormalities.  His transthoracic echocardiogram showed normal left ventricular size and systolic function and no wall motion abnormalities.  There was mild aortic valve stenosis and mild to moderate tricuspid valve regurgitation along with mild mitral valve regurgitation.  There was a very small pericardial effusion also documented.  He underwent perfusion stress test which is listed below.  I have reviewed the images and I do not agree with the read.  There looks to be a fixed area of decreased perfusion in the inferior wall and septum towards that is distal and apical.     We attempted medical management with aspirin, metoprolol and Imdur, however he had a headache with Imdur.  He states that he has had more chest pain as of late that is burning, central and nonradiating.  It goes away with rest but has been getting more intense and more frequent.  It is also requiring less activity to bring this on      Review of Systems   Constitutional: Negative for fever and malaise/fatigue.   HENT:  Negative for nosebleeds and sore throat.    Eyes:  Negative for blurred vision and double vision.   Cardiovascular:  Negative for chest pain, claudication, palpitations and syncope.   Respiratory:  Negative for cough, shortness of breath and snoring.   "  Endocrine: Negative for cold intolerance, heat intolerance and polydipsia.   Skin:  Negative for itching, poor wound healing and rash.   Musculoskeletal:  Negative for joint pain, joint swelling, muscle weakness and myalgias.   Gastrointestinal:  Negative for abdominal pain, melena, nausea and vomiting.   Neurological:  Negative for light-headedness, loss of balance, seizures, vertigo and weakness.   Psychiatric/Behavioral:  Negative for altered mental status and depression.        Past Medical History:   Diagnosis Date    Benign prostatic hyperplasia     GERD (gastroesophageal reflux disease)     Medication management     RLS (restless legs syndrome)     Sciatica        The following portions of the patient's history were reviewed and updated as appropriate: Social history , Family history and Surgical history     Current Outpatient Medications on File Prior to Visit   Medication Sig Dispense Refill    Aspirin Low Dose 81 MG EC tablet TAKE ONE TABLET BY MOUTH DAILY 90 tablet 3    calcium carbonate-cholecalciferol 500-400 MG-UNIT tablet tablet Take 1 tablet by mouth Daily.      gabapentin (NEURONTIN) 100 MG capsule Take 2 capsules by mouth Daily. Can increase by 100 mg every week. Max is 600mg      isosorbide mononitrate (IMDUR) 30 MG 24 hr tablet TAKE ONE TABLET BY MOUTH DAILY 90 tablet 3    metoprolol succinate XL (TOPROL-XL) 25 MG 24 hr tablet TAKE ONE TABLET BY MOUTH DAILY 90 tablet 3    pantoprazole (PROTONIX) 40 MG EC tablet TAKE ONE TABLET BY MOUTH DAILY 90 tablet 2    pramipexole (MIRAPEX) 1.5 MG tablet TAKE ONE TABLET BY MOUTH ONCE NIGHTLY 90 tablet 1    tamsulosin (FLOMAX) 0.4 MG capsule 24 hr capsule TAKE 1 CAPSULE BY MOUTH TWICE A  capsule 1     No current facility-administered medications on file prior to visit.       No Known Allergies    Vitals:    01/25/24 1044   BP: 112/60   Pulse: 64   Weight: 77.6 kg (171 lb)   Height: 182.9 cm (72\")     Body mass index is 23.19 kg/m².   Constitutional:  "      Appearance: Well-developed.   Eyes:      General: No scleral icterus.     Conjunctiva/sclera: Conjunctivae normal.   HENT:      Head: Normocephalic and atraumatic.   Neck:      Thyroid: No thyromegaly.      Vascular: Normal carotid pulses. No carotid bruit, hepatojugular reflux or JVD.      Trachea: No tracheal deviation.   Pulmonary:      Effort: No respiratory distress.      Breath sounds: Normal breath sounds. No decreased breath sounds. No wheezing. No rhonchi. No rales.   Chest:      Chest wall: Not tender to palpatation.   Cardiovascular:      Normal rate. Regular rhythm.      No gallop.    Pulses:     Carotid: 2+ bilaterally.     Radial: 2+ bilaterally.     Femoral: 2+ bilaterally.     Dorsalis pedis: 2+ bilaterally.     Posterior tibial: 2+ bilaterally.  Edema:     Peripheral edema absent.   Abdominal:      General: Bowel sounds are normal. There is no distension.      Palpations: Abdomen is soft.      Tenderness: There is no abdominal tenderness.   Musculoskeletal:         General: No deformity.      Cervical back: Normal range of motion and neck supple. Skin:     Findings: No erythema or rash.   Neurological:      Mental Status: Alert and oriented to person, place, and time.      Sensory: No sensory deficit.   Psychiatric:         Behavior: Behavior normal.            Lab Results   Component Value Date    WBC 7.75 11/22/2023    HGB 10.9 (L) 11/22/2023    HCT 33.6 (L) 11/22/2023    MCV 82.0 11/22/2023     11/22/2023       Lab Results   Component Value Date    GLUCOSE 90 11/22/2023    BUN 24 (H) 11/22/2023    CREATININE 1.12 11/22/2023    EGFRIFNONA 57 (L) 09/13/2021    EGFRIFAFRI 69 09/13/2021    BCR 21.4 11/22/2023    K 4.7 11/22/2023    CO2 27.6 11/22/2023    CALCIUM 9.1 11/22/2023    PROTENTOTREF 6.2 10/20/2022    ALBUMIN 3.8 11/22/2023    LABIL2 1.8 10/20/2022    AST 15 11/22/2023    ALT 12 11/22/2023       Lab Results   Component Value Date    GLUCOSE 90 11/22/2023    CALCIUM 9.1 11/22/2023      11/22/2023    K 4.7 11/22/2023    CO2 27.6 11/22/2023     11/22/2023    BUN 24 (H) 11/22/2023    CREATININE 1.12 11/22/2023    EGFRIFAFRI 69 09/13/2021    EGFRIFNONA 57 (L) 09/13/2021    BCR 21.4 11/22/2023    ANIONGAP 7.4 11/22/2023       Lab Results   Component Value Date    CHLPL 165 10/20/2022    TRIG 57 10/20/2022    HDL 51 10/20/2022     (H) 10/20/2022         ECG 12 Lead    Date/Time: 1/25/2024 11:02 AM  Performed by: Froylan Booker MD    Authorized by: Froylan Booker MD  Comparison: compared with previous ECG from 7/10/2023  Similar to previous ECG  Comparison to previous ECG: Patient no longer with short atrial runs  Rhythm: sinus rhythm  Rate: normal  QRS axis: normal  Comments: Incomplete right bundle branch block.           Results for orders placed during the hospital encounter of 11/15/22    Adult Transthoracic Echo Complete W/ Cont if Necessary Per Protocol    Interpretation Summary    Left ventricular systolic function is normal. Left ventricular ejection fraction appears to be 61 - 65%.    Left ventricular wall thickness is consistent with moderate concentric hypertrophy.    Left ventricular diastolic function is consistent with (grade I) impaired relaxation.    The right ventricular cavity is mildly dilated. Normal right ventricular systolic function noted.    The left atrial cavity is moderately dilated.    The interatrial septum appears redundant. The agitated saline study is positive with Valsalva, consistent with a small to moderate PFO    Mild to moderate aortic valve stenosis is present. Aortic valve maximum pressure gradient is 25 mmHg. Aortic valve mean pressure gradient is 15 mmHg.    Mild mitral valve regurgitation is present.    Mild to moderate tricuspid valve regurgitation is present    Calculated right ventricular systolic pressure from tricuspid regurgitation is 39 mmHg.    There is a small (<1cm) circumferential pericardial effusion. There is  no evidence of cardiac tamponade.    Stress    ST segment depression of 0.5 mm in the inferolateral leads was noted during stress (II, III, aVF, V5 and V6), beginning at 3 minutes of stress.    Myocardial perfusion imaging indicates a medium-sized, moderately severe area of ischemia located in the inferior wall and septal wall.    Left ventricular ejection fraction is normal (Calculated EF = 65%).    Impressions are consistent with a high risk study.      DISCUSSION/SUMMARY  89-year-old male with a medical history of chest pain, mild aortic valve stenosis, fixed inferoseptal to apical defect on stress test on my review, who initially presented to me with stable angina.  He denies any dyspnea on exertion, orthopnea or PND but is complaining of more frequent chest discomfort.  His chest pain is more intense, more frequent and consistent with unstable angina.  He has a previously abnormal stress test and has been on good medical management.    1.  Abnormal stress test: Inferoseptal fixed defect on my review  -Patient has more frequent chest pain consistent with unstable angina.  He has been on good medical therapy with aspirin, metoprolol and Imdur which she had to stop recently secondary to headaches.  -Continue metoprolol therapy at current dose.  Continue aspirin.  -I would recommend moving forward with coronary angiography.  He is aware of the risks of this procedure    2.  Mild aortic valve stenosis: Asymptomatic.  We will continue to follow.

## 2024-01-25 NOTE — PROGRESS NOTES
Jim Romero  3/17/1931  Date of Office Visit: 01/25/24  Encounter Provider: Froylan Booker MD  Place of Service: [facility] CARDIOLOG[facility]      CHIEF COMPLAINT:  Chest pain  Abnormal stress test  Preoperative restratification     HISTORY OF PRESENT ILLNESS:  89-year-old male patient who presented to the emergency room on 11/16/2022 with report of lower chest and epigastric discomfort.  Reportedly this was when he was walking and then raking leaves.  It is relieved by rest.  He denied rest pain.  His cardiac biomarkers were negative.  His electrocardiogram in the ER was reviewed and showed a sinus rhythm with just nonspecific ST-T wave abnormalities.  His transthoracic echocardiogram showed normal left ventricular size and systolic function and no wall motion abnormalities.  There was mild aortic valve stenosis and mild to moderate tricuspid valve regurgitation along with mild mitral valve regurgitation.  There was a very small pericardial effusion also documented.  He underwent perfusion stress test which is listed below.  I have reviewed the images and I do not agree with the read.  There looks to be a fixed area of decreased perfusion in the inferior wall and septum towards that is distal and apical.     We attempted medical management with aspirin, metoprolol and Imdur, however he had a headache with Imdur.  He states that he has had more chest pain as of late that is burning, central and nonradiating.  It goes away with rest but has been getting more intense and more frequent.  It is also requiring less activity to bring this on      Review of Systems   Constitutional: Negative for fever and malaise/fatigue.   HENT:  Negative for nosebleeds and sore throat.    Eyes:  Negative for blurred vision and double vision.   Cardiovascular:  Negative for chest pain, claudication, palpitations and syncope.   Respiratory:  Negative for cough, shortness of breath and snoring.   "  Endocrine: Negative for cold intolerance, heat intolerance and polydipsia.   Skin:  Negative for itching, poor wound healing and rash.   Musculoskeletal:  Negative for joint pain, joint swelling, muscle weakness and myalgias.   Gastrointestinal:  Negative for abdominal pain, melena, nausea and vomiting.   Neurological:  Negative for light-headedness, loss of balance, seizures, vertigo and weakness.   Psychiatric/Behavioral:  Negative for altered mental status and depression.        Past Medical History:   Diagnosis Date    Benign prostatic hyperplasia     GERD (gastroesophageal reflux disease)     Medication management     RLS (restless legs syndrome)     Sciatica        The following portions of the patient's history were reviewed and updated as appropriate: Social history , Family history and Surgical history     Current Outpatient Medications on File Prior to Visit   Medication Sig Dispense Refill    Aspirin Low Dose 81 MG EC tablet TAKE ONE TABLET BY MOUTH DAILY 90 tablet 3    calcium carbonate-cholecalciferol 500-400 MG-UNIT tablet tablet Take 1 tablet by mouth Daily.      gabapentin (NEURONTIN) 100 MG capsule Take 2 capsules by mouth Daily. Can increase by 100 mg every week. Max is 600mg      isosorbide mononitrate (IMDUR) 30 MG 24 hr tablet TAKE ONE TABLET BY MOUTH DAILY 90 tablet 3    metoprolol succinate XL (TOPROL-XL) 25 MG 24 hr tablet TAKE ONE TABLET BY MOUTH DAILY 90 tablet 3    pantoprazole (PROTONIX) 40 MG EC tablet TAKE ONE TABLET BY MOUTH DAILY 90 tablet 2    pramipexole (MIRAPEX) 1.5 MG tablet TAKE ONE TABLET BY MOUTH ONCE NIGHTLY 90 tablet 1    tamsulosin (FLOMAX) 0.4 MG capsule 24 hr capsule TAKE 1 CAPSULE BY MOUTH TWICE A  capsule 1     No current facility-administered medications on file prior to visit.       No Known Allergies    Vitals:    01/25/24 1044   BP: 112/60   Pulse: 64   Weight: 77.6 kg (171 lb)   Height: 182.9 cm (72\")     Body mass index is 23.19 kg/m².   Constitutional:  "      Appearance: Well-developed.   Eyes:      General: No scleral icterus.     Conjunctiva/sclera: Conjunctivae normal.   HENT:      Head: Normocephalic and atraumatic.   Neck:      Thyroid: No thyromegaly.      Vascular: Normal carotid pulses. No carotid bruit, hepatojugular reflux or JVD.      Trachea: No tracheal deviation.   Pulmonary:      Effort: No respiratory distress.      Breath sounds: Normal breath sounds. No decreased breath sounds. No wheezing. No rhonchi. No rales.   Chest:      Chest wall: Not tender to palpatation.   Cardiovascular:      Normal rate. Regular rhythm.      No gallop.    Pulses:     Carotid: 2+ bilaterally.     Radial: 2+ bilaterally.     Femoral: 2+ bilaterally.     Dorsalis pedis: 2+ bilaterally.     Posterior tibial: 2+ bilaterally.  Edema:     Peripheral edema absent.   Abdominal:      General: Bowel sounds are normal. There is no distension.      Palpations: Abdomen is soft.      Tenderness: There is no abdominal tenderness.   Musculoskeletal:         General: No deformity.      Cervical back: Normal range of motion and neck supple. Skin:     Findings: No erythema or rash.   Neurological:      Mental Status: Alert and oriented to person, place, and time.      Sensory: No sensory deficit.   Psychiatric:         Behavior: Behavior normal.            Lab Results   Component Value Date    WBC 7.75 11/22/2023    HGB 10.9 (L) 11/22/2023    HCT 33.6 (L) 11/22/2023    MCV 82.0 11/22/2023     11/22/2023       Lab Results   Component Value Date    GLUCOSE 90 11/22/2023    BUN 24 (H) 11/22/2023    CREATININE 1.12 11/22/2023    EGFRIFNONA 57 (L) 09/13/2021    EGFRIFAFRI 69 09/13/2021    BCR 21.4 11/22/2023    K 4.7 11/22/2023    CO2 27.6 11/22/2023    CALCIUM 9.1 11/22/2023    PROTENTOTREF 6.2 10/20/2022    ALBUMIN 3.8 11/22/2023    LABIL2 1.8 10/20/2022    AST 15 11/22/2023    ALT 12 11/22/2023       Lab Results   Component Value Date    GLUCOSE 90 11/22/2023    CALCIUM 9.1 11/22/2023      11/22/2023    K 4.7 11/22/2023    CO2 27.6 11/22/2023     11/22/2023    BUN 24 (H) 11/22/2023    CREATININE 1.12 11/22/2023    EGFRIFAFRI 69 09/13/2021    EGFRIFNONA 57 (L) 09/13/2021    BCR 21.4 11/22/2023    ANIONGAP 7.4 11/22/2023       Lab Results   Component Value Date    CHLPL 165 10/20/2022    TRIG 57 10/20/2022    HDL 51 10/20/2022     (H) 10/20/2022         ECG 12 Lead    Date/Time: 1/25/2024 11:02 AM  Performed by: Froylan Booker MD    Authorized by: Froylan Booker MD  Comparison: compared with previous ECG from 7/10/2023  Similar to previous ECG  Comparison to previous ECG: Patient no longer with short atrial runs  Rhythm: sinus rhythm  Rate: normal  QRS axis: normal  Comments: Incomplete right bundle branch block.           Results for orders placed during the hospital encounter of 11/15/22    Adult Transthoracic Echo Complete W/ Cont if Necessary Per Protocol    Interpretation Summary    Left ventricular systolic function is normal. Left ventricular ejection fraction appears to be 61 - 65%.    Left ventricular wall thickness is consistent with moderate concentric hypertrophy.    Left ventricular diastolic function is consistent with (grade I) impaired relaxation.    The right ventricular cavity is mildly dilated. Normal right ventricular systolic function noted.    The left atrial cavity is moderately dilated.    The interatrial septum appears redundant. The agitated saline study is positive with Valsalva, consistent with a small to moderate PFO    Mild to moderate aortic valve stenosis is present. Aortic valve maximum pressure gradient is 25 mmHg. Aortic valve mean pressure gradient is 15 mmHg.    Mild mitral valve regurgitation is present.    Mild to moderate tricuspid valve regurgitation is present    Calculated right ventricular systolic pressure from tricuspid regurgitation is 39 mmHg.    There is a small (<1cm) circumferential pericardial effusion. There is  no evidence of cardiac tamponade.    Stress    ST segment depression of 0.5 mm in the inferolateral leads was noted during stress (II, III, aVF, V5 and V6), beginning at 3 minutes of stress.    Myocardial perfusion imaging indicates a medium-sized, moderately severe area of ischemia located in the inferior wall and septal wall.    Left ventricular ejection fraction is normal (Calculated EF = 65%).    Impressions are consistent with a high risk study.      DISCUSSION/SUMMARY  89-year-old male with a medical history of chest pain, mild aortic valve stenosis, fixed inferoseptal to apical defect on stress test on my review, who initially presented to me with stable angina.  He denies any dyspnea on exertion, orthopnea or PND but is complaining of more frequent chest discomfort.  His chest pain is more intense, more frequent and consistent with unstable angina.  He has a previously abnormal stress test and has been on good medical management.    1.  Abnormal stress test: Inferoseptal fixed defect on my review  -Patient has more frequent chest pain consistent with unstable angina.  He has been on good medical therapy with aspirin, metoprolol and Imdur which she had to stop recently secondary to headaches.  -Continue metoprolol therapy at current dose.  Continue aspirin.  -I would recommend moving forward with coronary angiography.  He is aware of the risks of this procedure    2.  Mild aortic valve stenosis: Asymptomatic.  We will continue to follow.

## 2024-01-29 ENCOUNTER — LAB (OUTPATIENT)
Dept: LAB | Facility: HOSPITAL | Age: 89
End: 2024-01-29
Payer: MEDICARE

## 2024-01-29 ENCOUNTER — TRANSCRIBE ORDERS (OUTPATIENT)
Dept: CARDIOLOGY | Facility: CLINIC | Age: 89
End: 2024-01-29
Payer: MEDICARE

## 2024-01-29 DIAGNOSIS — Z01.810 PRE-OPERATIVE CARDIOVASCULAR EXAMINATION: ICD-10-CM

## 2024-01-29 DIAGNOSIS — Z01.810 PRE-OPERATIVE CARDIOVASCULAR EXAMINATION: Primary | ICD-10-CM

## 2024-01-29 DIAGNOSIS — Z13.6 SCREENING FOR ISCHEMIC HEART DISEASE: ICD-10-CM

## 2024-01-29 PROBLEM — I20.0 UNSTABLE ANGINA: Status: ACTIVE | Noted: 2024-01-25

## 2024-01-29 LAB
ANION GAP SERPL CALCULATED.3IONS-SCNC: 9 MMOL/L (ref 5–15)
BASOPHILS # BLD AUTO: 0.05 10*3/MM3 (ref 0–0.2)
BASOPHILS NFR BLD AUTO: 0.7 % (ref 0–1.5)
BUN SERPL-MCNC: 16 MG/DL (ref 8–23)
BUN/CREAT SERPL: 14.2 (ref 7–25)
CALCIUM SPEC-SCNC: 9.1 MG/DL (ref 8.2–9.6)
CHLORIDE SERPL-SCNC: 105 MMOL/L (ref 98–107)
CO2 SERPL-SCNC: 26 MMOL/L (ref 22–29)
CREAT SERPL-MCNC: 1.13 MG/DL (ref 0.76–1.27)
DEPRECATED RDW RBC AUTO: 42.1 FL (ref 37–54)
EGFRCR SERPLBLD CKD-EPI 2021: 61 ML/MIN/1.73
EOSINOPHIL # BLD AUTO: 0.14 10*3/MM3 (ref 0–0.4)
EOSINOPHIL NFR BLD AUTO: 1.8 % (ref 0.3–6.2)
ERYTHROCYTE [DISTWIDTH] IN BLOOD BY AUTOMATED COUNT: 14.2 % (ref 12.3–15.4)
GLUCOSE SERPL-MCNC: 117 MG/DL (ref 65–99)
HCT VFR BLD AUTO: 35.5 % (ref 37.5–51)
HGB BLD-MCNC: 11.3 G/DL (ref 13–17.7)
IMM GRANULOCYTES # BLD AUTO: 0.03 10*3/MM3 (ref 0–0.05)
IMM GRANULOCYTES NFR BLD AUTO: 0.4 % (ref 0–0.5)
LYMPHOCYTES # BLD AUTO: 1.4 10*3/MM3 (ref 0.7–3.1)
LYMPHOCYTES NFR BLD AUTO: 18.3 % (ref 19.6–45.3)
MCH RBC QN AUTO: 26.3 PG (ref 26.6–33)
MCHC RBC AUTO-ENTMCNC: 31.8 G/DL (ref 31.5–35.7)
MCV RBC AUTO: 82.6 FL (ref 79–97)
MONOCYTES # BLD AUTO: 0.51 10*3/MM3 (ref 0.1–0.9)
MONOCYTES NFR BLD AUTO: 6.7 % (ref 5–12)
NEUTROPHILS NFR BLD AUTO: 5.5 10*3/MM3 (ref 1.7–7)
NEUTROPHILS NFR BLD AUTO: 72.1 % (ref 42.7–76)
NRBC BLD AUTO-RTO: 0 /100 WBC (ref 0–0.2)
PLATELET # BLD AUTO: 190 10*3/MM3 (ref 140–450)
PMV BLD AUTO: 10.6 FL (ref 6–12)
POTASSIUM SERPL-SCNC: 4.4 MMOL/L (ref 3.5–5.2)
RBC # BLD AUTO: 4.3 10*6/MM3 (ref 4.14–5.8)
SODIUM SERPL-SCNC: 140 MMOL/L (ref 136–145)
WBC NRBC COR # BLD AUTO: 7.63 10*3/MM3 (ref 3.4–10.8)

## 2024-01-29 PROCEDURE — 80048 BASIC METABOLIC PNL TOTAL CA: CPT

## 2024-01-29 PROCEDURE — 36415 COLL VENOUS BLD VENIPUNCTURE: CPT

## 2024-01-29 PROCEDURE — 85025 COMPLETE CBC W/AUTO DIFF WBC: CPT

## 2024-01-30 ENCOUNTER — HOSPITAL ENCOUNTER (OUTPATIENT)
Facility: HOSPITAL | Age: 89
Discharge: HOME | End: 2024-01-31
Attending: INTERNAL MEDICINE | Admitting: INTERNAL MEDICINE
Payer: MEDICARE

## 2024-01-30 DIAGNOSIS — I20.0 UNSTABLE ANGINA: ICD-10-CM

## 2024-01-30 DIAGNOSIS — R07.2 PRECORDIAL PAIN: ICD-10-CM

## 2024-01-30 PROBLEM — I25.118 CORONARY ARTERY DISEASE OF NATIVE ARTERY OF NATIVE HEART WITH STABLE ANGINA PECTORIS: Status: ACTIVE | Noted: 2024-01-30

## 2024-01-30 LAB
QT INTERVAL: 456 MS
QTC INTERVAL: 475 MS

## 2024-01-30 PROCEDURE — C1874 STENT, COATED/COV W/DEL SYS: HCPCS | Performed by: INTERNAL MEDICINE

## 2024-01-30 PROCEDURE — 85347 COAGULATION TIME ACTIVATED: CPT

## 2024-01-30 PROCEDURE — C1769 GUIDE WIRE: HCPCS | Performed by: INTERNAL MEDICINE

## 2024-01-30 PROCEDURE — 92928 PRQ TCAT PLMT NTRAC ST 1 LES: CPT | Performed by: INTERNAL MEDICINE

## 2024-01-30 PROCEDURE — 25010000002 ADENOSINE PER 6 MG: Performed by: INTERNAL MEDICINE

## 2024-01-30 PROCEDURE — 63710000001 HYDROCODONE-ACETAMINOPHEN 5-325 MG TABLET: Performed by: INTERNAL MEDICINE

## 2024-01-30 PROCEDURE — C1894 INTRO/SHEATH, NON-LASER: HCPCS | Performed by: INTERNAL MEDICINE

## 2024-01-30 PROCEDURE — 93010 ELECTROCARDIOGRAM REPORT: CPT | Performed by: INTERNAL MEDICINE

## 2024-01-30 PROCEDURE — 25010000002 MIDAZOLAM PER 1 MG: Performed by: INTERNAL MEDICINE

## 2024-01-30 PROCEDURE — G0378 HOSPITAL OBSERVATION PER HR: HCPCS

## 2024-01-30 PROCEDURE — 63710000001 PRAMIPEXOLE 1.5 MG TABLET: Performed by: INTERNAL MEDICINE

## 2024-01-30 PROCEDURE — C1887 CATHETER, GUIDING: HCPCS | Performed by: INTERNAL MEDICINE

## 2024-01-30 PROCEDURE — 93005 ELECTROCARDIOGRAM TRACING: CPT | Performed by: INTERNAL MEDICINE

## 2024-01-30 PROCEDURE — 63710000001 PANTOPRAZOLE 40 MG TABLET DELAYED-RELEASE: Performed by: INTERNAL MEDICINE

## 2024-01-30 PROCEDURE — A9270 NON-COVERED ITEM OR SERVICE: HCPCS | Performed by: INTERNAL MEDICINE

## 2024-01-30 PROCEDURE — 25010000002 FENTANYL CITRATE (PF) 50 MCG/ML SOLUTION: Performed by: INTERNAL MEDICINE

## 2024-01-30 PROCEDURE — 25810000003 SODIUM CHLORIDE 0.9 % SOLUTION: Performed by: INTERNAL MEDICINE

## 2024-01-30 PROCEDURE — C1725 CATH, TRANSLUMIN NON-LASER: HCPCS | Performed by: INTERNAL MEDICINE

## 2024-01-30 PROCEDURE — 25010000002 HYDRALAZINE PER 20 MG: Performed by: INTERNAL MEDICINE

## 2024-01-30 PROCEDURE — 93454 CORONARY ARTERY ANGIO S&I: CPT | Performed by: INTERNAL MEDICINE

## 2024-01-30 PROCEDURE — 25010000002 HEPARIN (PORCINE) PER 1000 UNITS: Performed by: INTERNAL MEDICINE

## 2024-01-30 PROCEDURE — 25010000002 MORPHINE PER 10 MG: Performed by: INTERNAL MEDICINE

## 2024-01-30 PROCEDURE — 63710000001 METOPROLOL SUCCINATE XL 50 MG TABLET SUSTAINED-RELEASE 24 HOUR: Performed by: INTERNAL MEDICINE

## 2024-01-30 PROCEDURE — 63710000001 GABAPENTIN 100 MG CAPSULE: Performed by: INTERNAL MEDICINE

## 2024-01-30 PROCEDURE — 63710000001 TAMSULOSIN 0.4 MG CAPSULE: Performed by: INTERNAL MEDICINE

## 2024-01-30 PROCEDURE — C9600 PERC DRUG-EL COR STENT SING: HCPCS | Performed by: INTERNAL MEDICINE

## 2024-01-30 PROCEDURE — 25510000001 IOPAMIDOL PER 1 ML: Performed by: INTERNAL MEDICINE

## 2024-01-30 PROCEDURE — 63710000001 ATORVASTATIN 10 MG TABLET: Performed by: INTERNAL MEDICINE

## 2024-01-30 DEVICE — XIENCE SKYPOINT™ EVEROLIMUS ELUTING CORONARY STENT SYSTEM 3.50 MM X 23 MM / RAPID-EXCHANGE
Type: IMPLANTABLE DEVICE | Site: HEART | Status: FUNCTIONAL
Brand: XIENCE SKYPOINT™

## 2024-01-30 DEVICE — XIENCE SKYPOINT™ EVEROLIMUS ELUTING CORONARY STENT SYSTEM 3.50 MM X 38 MM / RAPID-EXCHANGE
Type: IMPLANTABLE DEVICE | Status: FUNCTIONAL
Brand: XIENCE SKYPOINT™

## 2024-01-30 RX ORDER — ASPIRIN 325 MG
TABLET ORAL
Status: DISCONTINUED | OUTPATIENT
Start: 2024-01-30 | End: 2024-01-30 | Stop reason: HOSPADM

## 2024-01-30 RX ORDER — PANTOPRAZOLE SODIUM 40 MG/1
40 TABLET, DELAYED RELEASE ORAL DAILY
Status: DISCONTINUED | OUTPATIENT
Start: 2024-01-30 | End: 2024-01-31 | Stop reason: HOSPADM

## 2024-01-30 RX ORDER — VERAPAMIL HYDROCHLORIDE 2.5 MG/ML
INJECTION, SOLUTION INTRAVENOUS
Status: DISCONTINUED | OUTPATIENT
Start: 2024-01-30 | End: 2024-01-30 | Stop reason: HOSPADM

## 2024-01-30 RX ORDER — SODIUM CHLORIDE 9 MG/ML
100 INJECTION, SOLUTION INTRAVENOUS CONTINUOUS
Status: DISCONTINUED | OUTPATIENT
Start: 2024-01-30 | End: 2024-01-31 | Stop reason: HOSPADM

## 2024-01-30 RX ORDER — METOPROLOL SUCCINATE 50 MG/1
25 TABLET, EXTENDED RELEASE ORAL DAILY
Status: DISCONTINUED | OUTPATIENT
Start: 2024-01-30 | End: 2024-01-31 | Stop reason: HOSPADM

## 2024-01-30 RX ORDER — ONDANSETRON 2 MG/ML
4 INJECTION INTRAMUSCULAR; INTRAVENOUS EVERY 6 HOURS PRN
Status: DISCONTINUED | OUTPATIENT
Start: 2024-01-30 | End: 2024-01-31 | Stop reason: HOSPADM

## 2024-01-30 RX ORDER — HYDRALAZINE HYDROCHLORIDE 20 MG/ML
INJECTION INTRAMUSCULAR; INTRAVENOUS
Status: DISCONTINUED | OUTPATIENT
Start: 2024-01-30 | End: 2024-01-30 | Stop reason: HOSPADM

## 2024-01-30 RX ORDER — ONDANSETRON 4 MG/1
4 TABLET, ORALLY DISINTEGRATING ORAL EVERY 6 HOURS PRN
Status: DISCONTINUED | OUTPATIENT
Start: 2024-01-30 | End: 2024-01-31 | Stop reason: HOSPADM

## 2024-01-30 RX ORDER — ASPIRIN 81 MG/1
81 TABLET ORAL DAILY
Status: DISCONTINUED | OUTPATIENT
Start: 2024-01-30 | End: 2024-01-30

## 2024-01-30 RX ORDER — GABAPENTIN 100 MG/1
200 CAPSULE ORAL DAILY
Status: DISCONTINUED | OUTPATIENT
Start: 2024-01-30 | End: 2024-01-31 | Stop reason: HOSPADM

## 2024-01-30 RX ORDER — ACETAMINOPHEN 325 MG/1
650 TABLET ORAL EVERY 4 HOURS PRN
Status: DISCONTINUED | OUTPATIENT
Start: 2024-01-30 | End: 2024-01-31 | Stop reason: HOSPADM

## 2024-01-30 RX ORDER — CLOPIDOGREL BISULFATE 75 MG/1
TABLET ORAL
Status: DISCONTINUED | OUTPATIENT
Start: 2024-01-30 | End: 2024-01-30 | Stop reason: HOSPADM

## 2024-01-30 RX ORDER — SODIUM CHLORIDE 9 MG/ML
50 INJECTION, SOLUTION INTRAVENOUS CONTINUOUS
Status: ACTIVE | OUTPATIENT
Start: 2024-01-30 | End: 2024-01-30

## 2024-01-30 RX ORDER — LIDOCAINE HYDROCHLORIDE 20 MG/ML
INJECTION, SOLUTION INFILTRATION; PERINEURAL
Status: DISCONTINUED | OUTPATIENT
Start: 2024-01-30 | End: 2024-01-30 | Stop reason: HOSPADM

## 2024-01-30 RX ORDER — TEMAZEPAM 15 MG/1
15 CAPSULE ORAL NIGHTLY PRN
Status: DISCONTINUED | OUTPATIENT
Start: 2024-01-30 | End: 2024-01-31 | Stop reason: HOSPADM

## 2024-01-30 RX ORDER — FENTANYL CITRATE 50 UG/ML
INJECTION, SOLUTION INTRAMUSCULAR; INTRAVENOUS
Status: DISCONTINUED | OUTPATIENT
Start: 2024-01-30 | End: 2024-01-30 | Stop reason: HOSPADM

## 2024-01-30 RX ORDER — SODIUM CHLORIDE 0.9 % (FLUSH) 0.9 %
10 SYRINGE (ML) INJECTION AS NEEDED
Status: DISCONTINUED | OUTPATIENT
Start: 2024-01-30 | End: 2024-01-30 | Stop reason: HOSPADM

## 2024-01-30 RX ORDER — MORPHINE SULFATE 2 MG/ML
2 INJECTION, SOLUTION INTRAMUSCULAR; INTRAVENOUS
Status: DISCONTINUED | OUTPATIENT
Start: 2024-01-30 | End: 2024-01-31 | Stop reason: HOSPADM

## 2024-01-30 RX ORDER — TAMSULOSIN HYDROCHLORIDE 0.4 MG/1
0.4 CAPSULE ORAL 2 TIMES DAILY
Status: DISCONTINUED | OUTPATIENT
Start: 2024-01-30 | End: 2024-01-31 | Stop reason: HOSPADM

## 2024-01-30 RX ORDER — ATORVASTATIN CALCIUM 10 MG/1
40 TABLET, FILM COATED ORAL NIGHTLY
Status: DISCONTINUED | OUTPATIENT
Start: 2024-01-30 | End: 2024-01-31 | Stop reason: HOSPADM

## 2024-01-30 RX ORDER — NITROGLYCERIN 0.4 MG/1
0.4 TABLET SUBLINGUAL
Status: DISCONTINUED | OUTPATIENT
Start: 2024-01-30 | End: 2024-01-31 | Stop reason: HOSPADM

## 2024-01-30 RX ORDER — ASPIRIN 81 MG/1
81 TABLET ORAL DAILY
Status: DISCONTINUED | OUTPATIENT
Start: 2024-01-30 | End: 2024-01-31 | Stop reason: HOSPADM

## 2024-01-30 RX ORDER — HEPARIN SODIUM 1000 [USP'U]/ML
INJECTION, SOLUTION INTRAVENOUS; SUBCUTANEOUS
Status: DISCONTINUED | OUTPATIENT
Start: 2024-01-30 | End: 2024-01-30 | Stop reason: HOSPADM

## 2024-01-30 RX ORDER — NALOXONE HCL 0.4 MG/ML
0.4 VIAL (ML) INJECTION
Status: DISCONTINUED | OUTPATIENT
Start: 2024-01-30 | End: 2024-01-31 | Stop reason: HOSPADM

## 2024-01-30 RX ORDER — PRAMIPEXOLE DIHYDROCHLORIDE 1.5 MG/1
1.5 TABLET ORAL NIGHTLY
Status: DISCONTINUED | OUTPATIENT
Start: 2024-01-30 | End: 2024-01-31 | Stop reason: HOSPADM

## 2024-01-30 RX ORDER — CLOPIDOGREL BISULFATE 75 MG/1
75 TABLET ORAL DAILY
Status: DISCONTINUED | OUTPATIENT
Start: 2024-01-31 | End: 2024-01-31 | Stop reason: HOSPADM

## 2024-01-30 RX ORDER — MIDAZOLAM HYDROCHLORIDE 1 MG/ML
INJECTION INTRAMUSCULAR; INTRAVENOUS
Status: DISCONTINUED | OUTPATIENT
Start: 2024-01-30 | End: 2024-01-30 | Stop reason: HOSPADM

## 2024-01-30 RX ORDER — HYDROCODONE BITARTRATE AND ACETAMINOPHEN 5; 325 MG/1; MG/1
1 TABLET ORAL EVERY 4 HOURS PRN
Status: DISCONTINUED | OUTPATIENT
Start: 2024-01-30 | End: 2024-01-31 | Stop reason: HOSPADM

## 2024-01-30 RX ADMIN — PANTOPRAZOLE SODIUM 40 MG: 40 TABLET, DELAYED RELEASE ORAL at 12:23

## 2024-01-30 RX ADMIN — PRAMIPEXOLE DIHYDROCHLORIDE 1.5 MG: 1.5 TABLET ORAL at 20:16

## 2024-01-30 RX ADMIN — GABAPENTIN 200 MG: 100 CAPSULE ORAL at 12:23

## 2024-01-30 RX ADMIN — METOPROLOL SUCCINATE 25 MG: 50 TABLET, FILM COATED, EXTENDED RELEASE ORAL at 14:05

## 2024-01-30 RX ADMIN — SODIUM CHLORIDE 50 ML/HR: 9 INJECTION, SOLUTION INTRAVENOUS at 12:14

## 2024-01-30 RX ADMIN — SODIUM CHLORIDE 100 ML/HR: 9 INJECTION, SOLUTION INTRAVENOUS at 10:21

## 2024-01-30 RX ADMIN — TAMSULOSIN HYDROCHLORIDE 0.4 MG: 0.4 CAPSULE ORAL at 20:16

## 2024-01-30 RX ADMIN — HYDROCODONE BITARTRATE AND ACETAMINOPHEN 1 TABLET: 5; 325 TABLET ORAL at 16:44

## 2024-01-30 RX ADMIN — MORPHINE SULFATE 2 MG: 2 INJECTION, SOLUTION INTRAMUSCULAR; INTRAVENOUS at 12:31

## 2024-01-30 RX ADMIN — ATORVASTATIN CALCIUM 40 MG: 10 TABLET, FILM COATED ORAL at 20:16

## 2024-01-30 NOTE — Clinical Note
Hemostasis started on the right radial artery. R-Band was used in achieving hemostasis. Radial compression device applied to vessel. Hemostasis achieved successfully. Closure device additional comment: Vasc band 15cc air

## 2024-01-30 NOTE — Clinical Note
First balloon inflation max pressure = 18 delmy. First balloon inflation duration = 5 seconds. Second inflation of balloon - Max pressure = 20 delmy. 2nd Inflation of balloon - Duration = 8 seconds. Third inflation of balloon - Max pressure = 20 delmy. 3rd Inflation of balloon - Duration = 8 seconds. Fourth inflation of balloon - Max pressure = 20 delmy. 4th Inflation of balloon - Duration = 5 seconds.

## 2024-01-30 NOTE — Clinical Note
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 Upon arrival pain had largely resolved, patient continued to experience chronic lower back pain. He was noted to have IMELDA, likely due to dehydration. Diuretics were held and oral hydration was encouraged with improvement in Cr.     Blood cultures returned positive for MSSA bacteremia, patient has been previously treated for this infection. ID following.     INTERVAL EVENTS  No acute events overnight. Patient continues to report chronic back pain, he denies any fevers, chills, chest pain, dysuria, nausea, vomiting or diarrhea.     Reviewed: Medical History, Surgical History, Social History, Family History and Old records requested/reviewed    OBJECTIVE  Vital signs (most recent):   Visit Vitals  /84 (BP Location: LUE - Left upper extremity, Patient Position: Semi-Pace's)   Pulse 96   Temp 98.5 °F (36.9 °C) (Oral)   Resp 16   Ht 5' 7\" (1.702 m)   Wt 82.6 kg   SpO2 97%   BMI 28.51 kg/m²       Physical Exam:    Constitutional: NAD, normal appearing male lying comfortably in bed   HEENT: PERRL, no scleral icterus or conjunctival pallor  Cardiovascular: Irregularly irregular rhythm, rate controlled, normal S1 S2, trace peripheral edema, no JVD.  Respiratory: No retractions or increased work of breathing, clear to auscultation bilaterally.  Gastrointestinal: +BS, soft, non-tender, non-distended, no  The right coronary artery was selectively engaged, injected and visualized. organomegaly.  Musculoskeletal: Moving all extremities equally   Neurologic: A&Ox3, No gross motor or sensory deficits.   Psychiatric: Affect and mood appropriate.     Intake/Output  Last Stool Occurrence: 1 (09/25/20 0000)    Intake/Output Summary (Last 24 hours) at 9/27/2020 0717  Last data filed at 9/27/2020 0422  Gross per 24 hour   Intake 1780 ml   Output 1150 ml   Net 630 ml       Laboratory Results (24 hour)  All new labs/imaging/microbiology results are reviewed. Pertinent results are discussed below.       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    # Recurrent MSSA Bacteremia  Third incidence this year, unclear what source is. DANIELA, MRI spine and other work up in august for chronic source was negative.   - Per discussion with ID, possible source could be retained PICC (abx stopped 9/14, PICC removed 9/22)   - ID following, appreciate recs   - repeat blood cx 9/25 NGTD, final report pending   - cefazolin TID (start date 9/25, stopped 9/26), started IV Ceftaroline BID (9/26)  - Outpatient IV antibiotics to be decided, per ID patient needs to be on lifelong antistaphylococcal therapy    # Acute Kidney Injury on CKD III B, resolved:   Baseline Creatinine 1.4-1.6, current creatinine 1.5  - UA showed mild hematuria (chronic).  - Urine output 1.15 L/24 hr  - Resumed lasix at 40 mg daily (home dose 40 mg BID)  - hold Metolazone for now  - Urology follow up as outpatient for chronic hematuria.      #Hyperkalemia: No new EKG changes   - current K 5.0  - Resumed lasix today  -Hold spironolactone and KCL  - daily BMP     # Hypomagnesemia: Consistently low Mg levels despite supplementation, per chart review chronic in nature. No diarrhea, could be related to high dose chronic PPI therapy.   - Mg 1.4  today  - Supplement and recheck prn  - Discontinue PPI, start famotidine 20 mg BID  - Scheduled MgO 400 mg daily     # Chronic back pain  Hx of chronic back pain, CT shows unchanged kyphosis and compression deformity of T12-L4. Pt reports no weakness, loss of sensation, or bowel/bladder incontinence. Denies trauma/inciting event.    - Tenderness to palpation at L5-S1 vertebrae, per patient chronic pain and stiffness  - Recent imaging remarkable for chronic degenerative  changes  -Pain control: Pt on tylenol and Oxycodone 2.5 Q4H PRN  - Lidocaine patch locally   - PT/OT    #Afib w/ RVR   Presented to the ED in Afib w/ RVR, now rate controlled, HR 90's  BRIAN-Vasc score: 4  -Continue Eliquis 2.5mg BID  -Continue metoprolol tartate 50mg BID      #Patchy infiltrates on CT: Most likely bibasilar atelectasis   Bilateral Groundglass densities seen on CT. Denies any constitutional or respiratory symptoms. Potential pneumonia or viral infection, low suspicion for bacterial infection given low procal and lack of other symptoms.   - COVID test negative     #HFrEF, not in exacerbation: Euvolemic on exam, no dyspnea at rest or orthopnea  Most recent echo 8/11 EF-50%. PTA on lasix, spironolactone, metolazone, Na and fluid restriction.    - NTpro-BNP at admission 4987.  - Lasix 40 mg daily, holding home Metolazone and Aldactone   - ECHO pending   - Regular, unrestricted diet    Best Practices  Fluids: none   Isolation: contact, droplet  Nutrition: PO Regular  Pain: as above  GI Prophylaxis: Famotidine PO  Code Status: DNR ([facility] [place]    DVT/VTE Prophylaxis:  VTE Pharmacologic Prophylaxis: No pharmacologic Venous Thromboembolism prophylaxis due to Currently therapeutic on antithrombotics or thrombolytics or anticoagulants (e.g. dabigatran, heparin, enoxaparin, fondaparinux, argatroban, warfarin, rivaroxaban, apixaban)  VTE Mechanical Prophylaxis: Yes    Quality Indicators  Heart failure?   Yes    LVEF assessment: yes - EF% and date  attached  Ejection Fraction   Date Value Ref Range Status   08/11/2020 50.0 % Final            LVEF < or equal to 35%: no  ACE/ARB for LVEF<40%: not indicated EF greater than 40%  Angiotensin Receptor Neprilysin Inhibitor for LVEF<40%: not indicated EF > 40%  Beta blocker (evidence based) for LVEF <40%: yes - Bisoprolol, Coreg or Metoprolol XL for LVEF < 40%  Aldosterone blocking agent/Antagonist prescribed for LVEF < or equal to 35%: no - contraindicated: potassium > 5.0  Hydralazine and Nitrate ordered for  patients w LVEF < 40%: not indicated - patient not   Hx of or current Atrial fibrillation/ Atrial flutter: history of and/or current atrial fibrillation/atrial flutter anti-thrombotic ordered (not aspirin or Plavix)  Post discharge follow-up within 7 days scheduled: yes  Stroke measures indicated? no  AMI? NO  Pneumonia? NO    Disposition: Inpatient, discharge pending antibiotic planning, may need placement.     The patient's history and physical were discussed with Wood Tuttle MD, who upon seeing the patient agreed with the above assessment and plan.    Signed,  Josefa Woody MD  Internal Medicine, PGY III  [phone]  9/27/2020 7:17 AM      Please contact the cross cover pager ([place] - , [facility] [place] - [phone] / ) for questions between:  Mon-Fri: 7p - 7a  Sat - Sun: 11a-7a    This patient is seen and case discussed with the resident. The progress note is reviewed and I agree with the assessment and plan formulated.  He is sitting in his chair. He reports that the current medication regimen is not controlling his lower back pain.  He has no other concerns.  Stable V/S. Irregularly irregular HR. Lungs CTA.   ID changed abx to IV Ceftaroline. Repeat blood cultures negative so far.  Resume oral Lasix today. The IMELDA has resolved. Creatinine is at baseline. Changed the Oxycodone to Q4 hrs as needed. MRI from last month showed chronic compression fractures in  the lumbar vertebrae with extensive DJD.  Continue with the Metoprolol and Eliquis for the Afib.     Wood Tuttle MD

## 2024-01-30 NOTE — PLAN OF CARE
Goal Outcome Evaluation:         89 yrs old male A&Ox4, VVS HR 58 SB O2 sat 96% on RA, cardiac cath pt with stent via R. Radial site, vask band removed, dressing applied. site soft no bruising no hematoma. Medicated for pain with prn orders. No acute concerns plan of care ongoing. D/C when approved.

## 2024-01-31 ENCOUNTER — READMISSION MANAGEMENT (OUTPATIENT)
Dept: CALL CENTER | Facility: HOSPITAL | Age: 89
End: 2024-01-31
Payer: MEDICARE

## 2024-01-31 VITALS
BODY MASS INDEX: 22.35 KG/M2 | WEIGHT: 165 LBS | DIASTOLIC BLOOD PRESSURE: 96 MMHG | HEART RATE: 64 BPM | RESPIRATION RATE: 16 BRPM | HEIGHT: 72 IN | OXYGEN SATURATION: 97 % | SYSTOLIC BLOOD PRESSURE: 143 MMHG | TEMPERATURE: 98.4 F

## 2024-01-31 LAB
ACT BLD: 228 SECONDS (ref 82–152)
ANION GAP SERPL CALCULATED.3IONS-SCNC: 9.9 MMOL/L (ref 5–15)
BUN SERPL-MCNC: 20 MG/DL (ref 8–23)
BUN/CREAT SERPL: 19.4 (ref 7–25)
CALCIUM SPEC-SCNC: 8.6 MG/DL (ref 8.2–9.6)
CHLORIDE SERPL-SCNC: 105 MMOL/L (ref 98–107)
CO2 SERPL-SCNC: 22.1 MMOL/L (ref 22–29)
CREAT SERPL-MCNC: 1.03 MG/DL (ref 0.76–1.27)
DEPRECATED RDW RBC AUTO: 41.1 FL (ref 37–54)
EGFRCR SERPLBLD CKD-EPI 2021: 68.2 ML/MIN/1.73
ERYTHROCYTE [DISTWIDTH] IN BLOOD BY AUTOMATED COUNT: 14.3 % (ref 12.3–15.4)
GLUCOSE SERPL-MCNC: 88 MG/DL (ref 65–99)
HCT VFR BLD AUTO: 36.1 % (ref 37.5–51)
HGB BLD-MCNC: 11.3 G/DL (ref 13–17.7)
MCH RBC QN AUTO: 24.9 PG (ref 26.6–33)
MCHC RBC AUTO-ENTMCNC: 31.3 G/DL (ref 31.5–35.7)
MCV RBC AUTO: 79.5 FL (ref 79–97)
PLATELET # BLD AUTO: 190 10*3/MM3 (ref 140–450)
PMV BLD AUTO: 11.1 FL (ref 6–12)
POTASSIUM SERPL-SCNC: 4.5 MMOL/L (ref 3.5–5.2)
QT INTERVAL: 428 MS
QTC INTERVAL: 424 MS
RBC # BLD AUTO: 4.54 10*6/MM3 (ref 4.14–5.8)
SODIUM SERPL-SCNC: 137 MMOL/L (ref 136–145)
WBC NRBC COR # BLD AUTO: 8.21 10*3/MM3 (ref 3.4–10.8)

## 2024-01-31 PROCEDURE — 99239 HOSP IP/OBS DSCHRG MGMT >30: CPT | Performed by: INTERNAL MEDICINE

## 2024-01-31 PROCEDURE — 93010 ELECTROCARDIOGRAM REPORT: CPT | Performed by: INTERNAL MEDICINE

## 2024-01-31 PROCEDURE — 63710000001 PANTOPRAZOLE 40 MG TABLET DELAYED-RELEASE: Performed by: INTERNAL MEDICINE

## 2024-01-31 PROCEDURE — 93005 ELECTROCARDIOGRAM TRACING: CPT | Performed by: INTERNAL MEDICINE

## 2024-01-31 PROCEDURE — 80048 BASIC METABOLIC PNL TOTAL CA: CPT | Performed by: INTERNAL MEDICINE

## 2024-01-31 PROCEDURE — 63710000001 ASPIRIN 81 MG TABLET DELAYED-RELEASE: Performed by: INTERNAL MEDICINE

## 2024-01-31 PROCEDURE — A9270 NON-COVERED ITEM OR SERVICE: HCPCS | Performed by: INTERNAL MEDICINE

## 2024-01-31 PROCEDURE — 63710000001 METOPROLOL SUCCINATE XL 50 MG TABLET SUSTAINED-RELEASE 24 HOUR: Performed by: INTERNAL MEDICINE

## 2024-01-31 PROCEDURE — G0378 HOSPITAL OBSERVATION PER HR: HCPCS

## 2024-01-31 PROCEDURE — 63710000001 CLOPIDOGREL 75 MG TABLET: Performed by: INTERNAL MEDICINE

## 2024-01-31 PROCEDURE — 63710000001 TAMSULOSIN 0.4 MG CAPSULE: Performed by: INTERNAL MEDICINE

## 2024-01-31 PROCEDURE — 85027 COMPLETE CBC AUTOMATED: CPT | Performed by: INTERNAL MEDICINE

## 2024-01-31 PROCEDURE — 63710000001 GABAPENTIN 100 MG CAPSULE: Performed by: INTERNAL MEDICINE

## 2024-01-31 RX ORDER — ATORVASTATIN CALCIUM 40 MG/1
40 TABLET, FILM COATED ORAL NIGHTLY
Qty: 90 TABLET | Refills: 3 | Status: SHIPPED | OUTPATIENT
Start: 2024-01-31

## 2024-01-31 RX ORDER — CLOPIDOGREL BISULFATE 75 MG/1
75 TABLET ORAL DAILY
Qty: 90 TABLET | Refills: 3 | Status: SHIPPED | OUTPATIENT
Start: 2024-02-01

## 2024-01-31 RX ORDER — NITROGLYCERIN 0.4 MG/1
0.4 TABLET SUBLINGUAL
Qty: 30 TABLET | Refills: 0 | Status: SHIPPED | OUTPATIENT
Start: 2024-01-31

## 2024-01-31 RX ADMIN — GABAPENTIN 200 MG: 100 CAPSULE ORAL at 09:15

## 2024-01-31 RX ADMIN — TAMSULOSIN HYDROCHLORIDE 0.4 MG: 0.4 CAPSULE ORAL at 09:15

## 2024-01-31 RX ADMIN — ASPIRIN 81 MG: 81 TABLET, COATED ORAL at 09:15

## 2024-01-31 RX ADMIN — PANTOPRAZOLE SODIUM 40 MG: 40 TABLET, DELAYED RELEASE ORAL at 09:15

## 2024-01-31 RX ADMIN — METOPROLOL SUCCINATE 25 MG: 50 TABLET, FILM COATED, EXTENDED RELEASE ORAL at 09:15

## 2024-01-31 RX ADMIN — CLOPIDOGREL BISULFATE 75 MG: 75 TABLET, FILM COATED ORAL at 09:15

## 2024-01-31 NOTE — PLAN OF CARE
Goal Outcome Evaluation:    Post cardiac cath via rt radial. Site soft, non tender. Slightly bruised. VSS Hr 60's NSR Pt denies any chest pain or soa. Appropriate for d/c. Follow up apt scheduled. Instructions reviewed with patient and son.

## 2024-01-31 NOTE — DISCHARGE SUMMARY
Date of Discharge:  1/31/2024  Date of Admit: 1/30/2024    Discharge Diagnosis  1.  Unstable angina  2.  Abnormal stress test  3.  Coronary artery disease with PCI to the ostial to mid-RCA.  LAD  4.  Mild aortic valve stenosis  5.  Mild to moderate tricuspid valve regurgitation      Hospital Course:   89-year-old male with a medical history of chest pain, mild aortic valve stenosis, fixed inferoseptal to apical defect on stress test on my review, who initially presented to me with stable angina.  Upon questioning his chest pain was more frequent and seem to be more unstable.  He he was taken for coronary angiography and noted to have a severe 80 to 90% calcified stenosis of the ostial to mid RCA and a  of the LAD.  He has been intolerant to Imdur but on good medical management he continued to have angina.  He underwent PCI of that RCA with 2 overlapping drug-eluting stents as documented below.  We brought back for staged intervention to the LAD.    Procedures Performed  Procedure(s):  Coronary angiography, Left heart catheterization, Left ventricular angiography  Percutaneous Coronary Intervention  Stent JAM coronary          Conclusions:   1. Left main: Calcified 30 to 40% distal stenosis.  2. LAD: Severely calcified chronic total occlusion mid segment fills via bridging collaterals and left to left collaterals.  3. LCX: Diffuse 70% calcified OM1 stenosis in proximal segment  4. RCA: Ostial 80% stenosis with moderate calcification and severely calcified 80 to 90% proximal to mid vessel stenosis  5.  Successful PCI of the ostial to mid RCA with overlapping 3.5 x 23 mm and 3.5 x 38 mm Xience yony point drug-eluting stents.  Postdilated to high-pressure with a 4 mm NC trek balloon.     Recommendations: Aspirin and Plavix.  Staged intervention to the LAD with femoral access in 1 week.       Consults       No orders found for last 30 day(s).                Discharge Physical Exam:  Temp:  [97 °F (36.1 °C)-98.6 °F  "(37 °C)] 98.6 °F (37 °C)  Heart Rate:  [58-67] 59  Resp:  [12-18] 16  BP: (120-152)/(73-79) 151/76    Intake/Output Summary (Last 24 hours) at 1/31/2024 1016  Last data filed at 1/31/2024 0426  Gross per 24 hour   Intake 250 ml   Output 900 ml   Net -650 ml     Flowsheet Rows      Flowsheet Row First Filed Value   Admission Height 182.9 cm (72\") Documented at 01/30/2024 1012   Admission Weight 74.8 kg (165 lb) Documented at 01/30/2024 1012            General Appearance:    Alert, cooperative, in no acute distress   Head:    Normocephalic, without obvious abnormality, atraumatic       Neck/Lymph   No adenopathy, supple, no thyromegaly, no carotid bruit, no    JVD   Lungs:     Clear to auscultation bilaterally, no wheezes, rales, or     rhonchi    Cardiac:    Normal rate, regular rhythm, no murmur, no rub, no gallop   Chest Wall:    No abnormalities observed   GI:     Normal bowel sounds, soft, nontender, nondistended,            no rebound tenderness   Extremities:   No cyanosis, clubbing, or edema   Circulatory/Peripheral Vascular :   Pulses palpable and equal bilaterally   Integumentary:   No bleeding or rash. Normal temperature                  Discharge Medications     Your medication list        START taking these medications        Instructions Last Dose Given Next Dose Due   atorvastatin 40 MG tablet  Commonly known as: LIPITOR      Take 1 tablet by mouth Every Night.       clopidogrel 75 MG tablet  Commonly known as: PLAVIX  Start taking on: February 1, 2024      Take 1 tablet by mouth Daily.       nitroglycerin 0.4 MG SL tablet  Commonly known as: NITROSTAT      Place 1 tablet under the tongue Every 5 (Five) Minutes As Needed for Chest Pain (Systolic BP Greater Than 100). Take no more than 3 doses in 15 minutes.              CONTINUE taking these medications        Instructions Last Dose Given Next Dose Due   Aspirin Low Dose 81 MG EC tablet  Generic drug: aspirin      TAKE ONE TABLET BY MOUTH DAILY     "   calcium carbonate-cholecalciferol 500-400 MG-UNIT tablet tablet      Take 1 tablet by mouth Daily.       gabapentin 100 MG capsule  Commonly known as: NEURONTIN      Take 2 capsules by mouth Daily. Can increase by 100 mg every week. Max is 600mg       isosorbide mononitrate 30 MG 24 hr tablet  Commonly known as: IMDUR      TAKE ONE TABLET BY MOUTH DAILY       metoprolol succinate XL 25 MG 24 hr tablet  Commonly known as: TOPROL-XL      TAKE ONE TABLET BY MOUTH DAILY       pantoprazole 40 MG EC tablet  Commonly known as: PROTONIX      TAKE ONE TABLET BY MOUTH DAILY       pramipexole 1.5 MG tablet  Commonly known as: MIRAPEX      TAKE ONE TABLET BY MOUTH ONCE NIGHTLY       tamsulosin 0.4 MG capsule 24 hr capsule  Commonly known as: FLOMAX      TAKE 1 CAPSULE BY MOUTH TWICE A DAY                 Where to Get Your Medications        These medications were sent to [facility] [id number] [address] AT [facility] [phone] [facility] [phone] [address], [facility] [address]      Phone: [phone]   atorvastatin 40 MG tablet  clopidogrel 75 MG tablet  nitroglycerin 0.4 MG SL tablet            Discharge Diet: Cardiac, carbohydrate controlled    Activity at Discharge: No lifting greater than 10 pounds 1 week.    Discharge disposition: home    Condition on Discharge: stable    Follow-up Appointments  Future Appointments   Date Time Provider Department Center   3/25/2024 10:00 AM Reji Gilman MD [id number]     Additional Instructions for the Follow-ups that You Need to Schedule       Ambulatory Referral to Cardiac Rehab   As directed              Test Results Pending at Discharge       Froylan Booker MD  01/31/24  10:15 EST    Greater than 30 min spent in reviewing records, discussion and examination of the patient and discussion with other members of the patient's medical team.     Dictated utilizing Dragon dictation

## 2024-01-31 NOTE — OUTREACH NOTE
Prep Survey      Flowsheet Row Responses   [facility] [place] patient discharged from? [place]   Is LACE score < 7 ? Yes   Eligibility [facility]   Date of Admission 01/30/24   Date of Discharge 01/31/24   Discharge Disposition Home or Self Care   Discharge diagnosis unstable angina, heart cath & stent   Does the patient have one of the following disease processes/diagnoses(primary or secondary)? Other   Does the patient have Home health ordered? No   Is there a DME ordered? No   Prep survey completed? Yes            Cassi LANZA - Registered Nurse

## 2024-01-31 NOTE — PROGRESS NOTES
[facility] Clinical Pharmacy Services: Pharmacy Education - Post PCI Discharge Medication    Jim Romero was counseled over post-PCI medications prior to discharge (patient was hard of hearing). Counseling points included the followin) Clopidogrel's indication, patient's need for the medication, and dosing/frequency  2) Enforced the importance of taking clopidogrel/aspirin as instructed every day  3) Explained possible side effects of clopidogrel/aspirin therapy, including increased risk of bleeding, s/sx of bleeding, and increase in cold intolerance. Also talked about ways to control bleeding for minor cuts and scrapes.  4) Discussed all important drug interactions, including over-the-counter medications.     Patient was also started on high dose atorvastatin. Talked about patient's need for medications in light of stent placement, dosing/frequency, and importance of taking medication at night. Talked about interactions, including interaction with grapefruit juice, and using alcohol in moderation.     Explained risk for thrombosis on new stent (especially in the first 3-6 months) and medication compliance. Patient also discharged on nitroglycerin sublingual tablets.    Patient expressed understanding and had no further questions.      Anais Lennon, Pharm.D., [facility]   Clinical Staff Pharmacist   Phone Extension #[phone]

## 2024-02-01 ENCOUNTER — TRANSITIONAL CARE MANAGEMENT TELEPHONE ENCOUNTER (OUTPATIENT)
Dept: CALL CENTER | Facility: HOSPITAL | Age: 89
End: 2024-02-01
Payer: MEDICARE

## 2024-02-01 NOTE — OUTREACH NOTE
Call Center TCM Note      Flowsheet Row Responses   [facility] patient discharged from? [place]   Does the patient have one of the following disease processes/diagnoses(primary or secondary)? Other   TCM attempt successful? Yes   Call start time 0839   Call end time 0849   Discharge diagnosis unstable angina, heart cath & stent   Person spoke with today (if not patient) and relationship spouse   Meds reviewed with patient/caregiver? Yes   Is the patient having any side effects they believe may be caused by any medication additions or changes? No   Does the patient have all medications ordered at discharge? Yes   Is the patient taking all medications as directed (includes completed medication regime)? Yes   Comments Hospital d/c f/u appt on 2/14/24 @11:30am   Does the patient have an appointment with their PCP within 7-14 days of discharge? Yes   Has home health visited the patient within 72 hours of discharge? N/A   Psychosocial issues? No   Did the patient receive a copy of their discharge instructions? Yes   Nursing interventions Reviewed instructions with patient   What is the patient's perception of their health status since discharge? Improving   Is the patient/caregiver able to teach back the hierarchy of who to call/visit for symptoms/problems? PCP, Specialist, Home health nurse, Urgent Care, ED, 911 Yes   TCM call completed? Yes   Call end time 0849   Would this patient benefit from a Referral to Amb Social Work? No   Is the patient interested in additional calls from an ambulatory ? No            Ambar Mendez RN    2/1/2024, 08:49 EST

## 2024-02-06 ENCOUNTER — HOSPITAL ENCOUNTER (OUTPATIENT)
Facility: HOSPITAL | Age: 89
Discharge: HOME | End: 2024-02-07
Attending: INTERNAL MEDICINE | Admitting: INTERNAL MEDICINE
Payer: MEDICARE

## 2024-02-06 DIAGNOSIS — I20.0 UNSTABLE ANGINA: ICD-10-CM

## 2024-02-06 LAB
QT INTERVAL: 437 MS
QTC INTERVAL: 433 MS

## 2024-02-06 PROCEDURE — 25810000003 SODIUM CHLORIDE 0.9 % SOLUTION: Performed by: INTERNAL MEDICINE

## 2024-02-06 PROCEDURE — C1887 CATHETER, GUIDING: HCPCS | Performed by: INTERNAL MEDICINE

## 2024-02-06 PROCEDURE — 25010000002 MIDAZOLAM PER 1 MG: Performed by: INTERNAL MEDICINE

## 2024-02-06 PROCEDURE — A9270 NON-COVERED ITEM OR SERVICE: HCPCS | Performed by: INTERNAL MEDICINE

## 2024-02-06 PROCEDURE — C1725 CATH, TRANSLUMIN NON-LASER: HCPCS | Performed by: INTERNAL MEDICINE

## 2024-02-06 PROCEDURE — 63710000001 METOPROLOL SUCCINATE XL 50 MG TABLET SUSTAINED-RELEASE 24 HOUR: Performed by: INTERNAL MEDICINE

## 2024-02-06 PROCEDURE — 25010000002 FENTANYL CITRATE (PF) 50 MCG/ML SOLUTION: Performed by: INTERNAL MEDICINE

## 2024-02-06 PROCEDURE — 93005 ELECTROCARDIOGRAM TRACING: CPT | Performed by: INTERNAL MEDICINE

## 2024-02-06 PROCEDURE — 25010000002 HYDRALAZINE PER 20 MG: Performed by: INTERNAL MEDICINE

## 2024-02-06 PROCEDURE — 63710000001 TAMSULOSIN 0.4 MG CAPSULE: Performed by: INTERNAL MEDICINE

## 2024-02-06 PROCEDURE — 25510000001 IOPAMIDOL PER 1 ML: Performed by: INTERNAL MEDICINE

## 2024-02-06 PROCEDURE — 93010 ELECTROCARDIOGRAM REPORT: CPT | Performed by: INTERNAL MEDICINE

## 2024-02-06 PROCEDURE — 63710000001 PRAMIPEXOLE 1.5 MG TABLET: Performed by: INTERNAL MEDICINE

## 2024-02-06 PROCEDURE — 85347 COAGULATION TIME ACTIVATED: CPT

## 2024-02-06 PROCEDURE — C1769 GUIDE WIRE: HCPCS | Performed by: INTERNAL MEDICINE

## 2024-02-06 PROCEDURE — C1894 INTRO/SHEATH, NON-LASER: HCPCS | Performed by: INTERNAL MEDICINE

## 2024-02-06 PROCEDURE — 25010000002 HEPARIN (PORCINE) PER 1000 UNITS: Performed by: INTERNAL MEDICINE

## 2024-02-06 PROCEDURE — G0378 HOSPITAL OBSERVATION PER HR: HCPCS

## 2024-02-06 PROCEDURE — 92943 PRQ TRLUML REVSC CH OCC ANT: CPT | Performed by: INTERNAL MEDICINE

## 2024-02-06 PROCEDURE — 63710000001 GABAPENTIN 100 MG CAPSULE: Performed by: INTERNAL MEDICINE

## 2024-02-06 PROCEDURE — C1760 CLOSURE DEV, VASC: HCPCS | Performed by: INTERNAL MEDICINE

## 2024-02-06 PROCEDURE — 63710000001 ATORVASTATIN 10 MG TABLET: Performed by: INTERNAL MEDICINE

## 2024-02-06 PROCEDURE — 25010000002 PROTAMINE SULFATE PER 10 MG: Performed by: INTERNAL MEDICINE

## 2024-02-06 PROCEDURE — C1874 STENT, COATED/COV W/DEL SYS: HCPCS | Performed by: INTERNAL MEDICINE

## 2024-02-06 PROCEDURE — C9607 PERC D-E COR REVASC CHRO SIN: HCPCS | Performed by: INTERNAL MEDICINE

## 2024-02-06 DEVICE — IMPLANTABLE DEVICE: Type: IMPLANTABLE DEVICE | Site: CORONARY | Status: FUNCTIONAL

## 2024-02-06 RX ORDER — ACETAMINOPHEN 325 MG/1
650 TABLET ORAL EVERY 4 HOURS PRN
Status: DISCONTINUED | OUTPATIENT
Start: 2024-02-06 | End: 2024-02-07 | Stop reason: HOSPADM

## 2024-02-06 RX ORDER — ASPIRIN 81 MG/1
TABLET, CHEWABLE ORAL
Status: DISCONTINUED | OUTPATIENT
Start: 2024-02-06 | End: 2024-02-06 | Stop reason: HOSPADM

## 2024-02-06 RX ORDER — SODIUM CHLORIDE 9 MG/ML
40 INJECTION, SOLUTION INTRAVENOUS AS NEEDED
Status: DISCONTINUED | OUTPATIENT
Start: 2024-02-06 | End: 2024-02-07

## 2024-02-06 RX ORDER — CLOPIDOGREL BISULFATE 75 MG/1
75 TABLET ORAL DAILY
Status: DISCONTINUED | OUTPATIENT
Start: 2024-02-07 | End: 2024-02-07 | Stop reason: HOSPADM

## 2024-02-06 RX ORDER — HYDROCODONE BITARTRATE AND ACETAMINOPHEN 5; 325 MG/1; MG/1
1 TABLET ORAL EVERY 4 HOURS PRN
Status: DISCONTINUED | OUTPATIENT
Start: 2024-02-06 | End: 2024-02-07 | Stop reason: HOSPADM

## 2024-02-06 RX ORDER — NITROGLYCERIN 0.4 MG/1
0.4 TABLET SUBLINGUAL
Status: DISCONTINUED | OUTPATIENT
Start: 2024-02-06 | End: 2024-02-07 | Stop reason: HOSPADM

## 2024-02-06 RX ORDER — SODIUM CHLORIDE 9 MG/ML
75 INJECTION, SOLUTION INTRAVENOUS CONTINUOUS
Status: DISCONTINUED | OUTPATIENT
Start: 2024-02-06 | End: 2024-02-07 | Stop reason: HOSPADM

## 2024-02-06 RX ORDER — SODIUM CHLORIDE 0.9 % (FLUSH) 0.9 %
10 SYRINGE (ML) INJECTION AS NEEDED
Status: DISCONTINUED | OUTPATIENT
Start: 2024-02-06 | End: 2024-02-07

## 2024-02-06 RX ORDER — TAMSULOSIN HYDROCHLORIDE 0.4 MG/1
0.4 CAPSULE ORAL 2 TIMES DAILY
Status: DISCONTINUED | OUTPATIENT
Start: 2024-02-06 | End: 2024-02-07 | Stop reason: HOSPADM

## 2024-02-06 RX ORDER — PROTAMINE SULFATE 10 MG/ML
INJECTION, SOLUTION INTRAVENOUS
Status: DISCONTINUED | OUTPATIENT
Start: 2024-02-06 | End: 2024-02-06 | Stop reason: HOSPADM

## 2024-02-06 RX ORDER — METOPROLOL SUCCINATE 50 MG/1
25 TABLET, EXTENDED RELEASE ORAL DAILY
Status: DISCONTINUED | OUTPATIENT
Start: 2024-02-06 | End: 2024-02-07 | Stop reason: HOSPADM

## 2024-02-06 RX ORDER — HYDRALAZINE HYDROCHLORIDE 20 MG/ML
INJECTION INTRAMUSCULAR; INTRAVENOUS
Status: DISCONTINUED | OUTPATIENT
Start: 2024-02-06 | End: 2024-02-06 | Stop reason: HOSPADM

## 2024-02-06 RX ORDER — GABAPENTIN 100 MG/1
200 CAPSULE ORAL DAILY
Status: DISCONTINUED | OUTPATIENT
Start: 2024-02-06 | End: 2024-02-07 | Stop reason: HOSPADM

## 2024-02-06 RX ORDER — ATORVASTATIN CALCIUM 10 MG/1
40 TABLET, FILM COATED ORAL NIGHTLY
Status: DISCONTINUED | OUTPATIENT
Start: 2024-02-06 | End: 2024-02-07 | Stop reason: HOSPADM

## 2024-02-06 RX ORDER — SODIUM CHLORIDE 0.9 % (FLUSH) 0.9 %
10 SYRINGE (ML) INJECTION EVERY 12 HOURS SCHEDULED
Status: DISCONTINUED | OUTPATIENT
Start: 2024-02-06 | End: 2024-02-07

## 2024-02-06 RX ORDER — MIDAZOLAM HYDROCHLORIDE 1 MG/ML
INJECTION INTRAMUSCULAR; INTRAVENOUS
Status: DISCONTINUED | OUTPATIENT
Start: 2024-02-06 | End: 2024-02-06 | Stop reason: HOSPADM

## 2024-02-06 RX ORDER — SODIUM CHLORIDE 9 MG/ML
INJECTION, SOLUTION INTRAVENOUS
Status: COMPLETED | OUTPATIENT
Start: 2024-02-06 | End: 2024-02-06

## 2024-02-06 RX ORDER — ONDANSETRON 4 MG/1
4 TABLET, ORALLY DISINTEGRATING ORAL EVERY 6 HOURS PRN
Status: DISCONTINUED | OUTPATIENT
Start: 2024-02-06 | End: 2024-02-07 | Stop reason: HOSPADM

## 2024-02-06 RX ORDER — CLOPIDOGREL BISULFATE 75 MG/1
TABLET ORAL
Status: DISCONTINUED | OUTPATIENT
Start: 2024-02-06 | End: 2024-02-06 | Stop reason: HOSPADM

## 2024-02-06 RX ORDER — HEPARIN SODIUM 1000 [USP'U]/ML
INJECTION, SOLUTION INTRAVENOUS; SUBCUTANEOUS
Status: DISCONTINUED | OUTPATIENT
Start: 2024-02-06 | End: 2024-02-06 | Stop reason: HOSPADM

## 2024-02-06 RX ORDER — LIDOCAINE HYDROCHLORIDE 20 MG/ML
INJECTION, SOLUTION INFILTRATION; PERINEURAL
Status: DISCONTINUED | OUTPATIENT
Start: 2024-02-06 | End: 2024-02-06 | Stop reason: HOSPADM

## 2024-02-06 RX ORDER — FENTANYL CITRATE 50 UG/ML
INJECTION, SOLUTION INTRAMUSCULAR; INTRAVENOUS
Status: DISCONTINUED | OUTPATIENT
Start: 2024-02-06 | End: 2024-02-06 | Stop reason: HOSPADM

## 2024-02-06 RX ORDER — PRAMIPEXOLE DIHYDROCHLORIDE 1.5 MG/1
1.5 TABLET ORAL NIGHTLY
Status: DISCONTINUED | OUTPATIENT
Start: 2024-02-06 | End: 2024-02-07 | Stop reason: HOSPADM

## 2024-02-06 RX ORDER — PANTOPRAZOLE SODIUM 40 MG/1
40 TABLET, DELAYED RELEASE ORAL DAILY
Status: DISCONTINUED | OUTPATIENT
Start: 2024-02-06 | End: 2024-02-07 | Stop reason: HOSPADM

## 2024-02-06 RX ORDER — ASPIRIN 81 MG/1
81 TABLET ORAL DAILY
Status: DISCONTINUED | OUTPATIENT
Start: 2024-02-06 | End: 2024-02-07 | Stop reason: HOSPADM

## 2024-02-06 RX ORDER — ONDANSETRON 2 MG/ML
4 INJECTION INTRAMUSCULAR; INTRAVENOUS EVERY 6 HOURS PRN
Status: DISCONTINUED | OUTPATIENT
Start: 2024-02-06 | End: 2024-02-07 | Stop reason: HOSPADM

## 2024-02-06 RX ORDER — SODIUM CHLORIDE 9 MG/ML
50 INJECTION, SOLUTION INTRAVENOUS CONTINUOUS
Status: ACTIVE | OUTPATIENT
Start: 2024-02-06 | End: 2024-02-06

## 2024-02-06 RX ADMIN — TAMSULOSIN HYDROCHLORIDE 0.4 MG: 0.4 CAPSULE ORAL at 21:14

## 2024-02-06 RX ADMIN — METOPROLOL SUCCINATE 25 MG: 50 TABLET, FILM COATED, EXTENDED RELEASE ORAL at 14:27

## 2024-02-06 RX ADMIN — Medication 10 ML: at 21:15

## 2024-02-06 RX ADMIN — PRAMIPEXOLE DIHYDROCHLORIDE 1.5 MG: 1.5 TABLET ORAL at 21:14

## 2024-02-06 RX ADMIN — GABAPENTIN 200 MG: 100 CAPSULE ORAL at 14:27

## 2024-02-06 RX ADMIN — ATORVASTATIN CALCIUM 40 MG: 10 TABLET, FILM COATED ORAL at 21:14

## 2024-02-06 RX ADMIN — SODIUM CHLORIDE 75 ML/HR: 9 INJECTION, SOLUTION INTRAVENOUS at 07:35

## 2024-02-06 NOTE — DISCHARGE INSTRUCTIONS
[facility]  [address] [place] [address]    Coronary Angiogram with Stent (Femoral Approach) After Care    Refer to this sheet in the next few weeks. These instructions provide you with information on caring for yourself after your procedure. Your health care provider may also give you more specific instructions. Your treatment has been planned according to current medical practices, but problems sometimes occur. Call your health care provider if you have any problems or questions after your procedure.    WHAT TO EXPECT AFTER THE PROCEDURE    The insertion site may be tender for a few days after your procedure.  Minor discomfort or tenderness and a small bump at the catheter insertion site.  The bump should decrease in size and tenderness within 1 to 2 weeks.     HOME CARE INSTRUCTIONS      Take medicines only as directed by your health care provider. Blood thinners may be prescribed after your procedure to improve blood flow through the stent.  Metformin or any medications containing Metformin should not be taken for 48 hours after your procedure.    Cardiac Rehab may or may not be ordered.  Please consult with your physician.   Do not apply powder or lotion to the site.    Check your insertion site every day for redness, swelling, or fluid leaking from the insertion site.    Increase your fluid intake for the next 2 days.    Hold direct pressure over the site when you cough, sneeze, laugh or change positions.  Do this for the next 2 days.    Avoid strenuous activity as directed by your physician.  Follow instructions about when you can drive and return to work as directed by your physician.     Do not take baths, swim, or use a hot tub until your health care provider approves.   You may shower 24 hours after the procedure. Remove the bandage (dressing) and gently wash the site with plain soap and water.  Gently pat the site dry.  Do not rub the insertion area with a washcloth or towel.  You  may apply a band aid daily for 2 days if desired.   Limit your activity for the first 48 hours.  Do not bend, squat, or lift anything over 20lb. (9 kg) or as directed by your health care provider.  However, we recommend lifting nothing heavier than a gallon of milk.   Do not operate machinery or power tools for 24 hours.  A responsible adult should be with you for the first 24 hours after you arrive home. Do not make any important legal decisions or sign legal papers for 24 hours.  Do not drink alcohol for 24 hours.    Eat a heart-healthy diet. This should include plenty of fresh fruits and vegetables. Meat should be lean cuts. Avoid the following types of food:    Food that is high in salt.    Canned or highly processed food.    Food that is high in saturated fat or sugar.    Fried food.    Make any other lifestyle changes recommended by your health care provider. This may include:    Not using any tobacco products including cigarettes, chewing tobacco, or electronic cigarettes.   Managing your weight.    Getting regular exercise.    Managing your blood pressure.    Limiting your alcohol intake.    Managing other health problems, such as diabetes.    If you need an MRI after your heart stent was placed, be sure to tell the health care provider who orders the MRI that you have a heart stent.    Keep all follow-up visits as directed by your health care provider.      Call your doctor if:    You have heavy bleeding from the site, lie down flat, hold manual pressure and call 911 immediately.     You develop chest pain, shortness of breath, feel faint, or pass out.  You have bleeding, swelling larger than a walnut, or drainage from the catheter insertion site.  You develop pain, discoloration, coldness, numbness, tingling, or severe bruising in the leg that held the catheter.  You develop bleeding from any other place such as from the bowels.   You have a fever > 101 or chills.    Call Your Doctor If:     You have any  symptoms of a stroke.  Remember BE FAST  B-balance. Sudden trouble walking or loss of balance.  E-eyes.  Sudden changes in how you see or a sudden onset of a very bad headache.   F-face. Sudden weakness or loss of feeling of the face or facial droop on one side.   A-arms Sudden weakness or numbness in one arm.  One arm drifts down if they are both held out in front of you. This happens suddenly and usually on one side of the body.  S-speech.  Sudden trouble speaking, slurred speech or trouble understanding what people are saying.   T-time  Time to call emergency services.  Write down the symptoms and the time they started.    MAKE SURE YOU:  Understand these instructions.  Will watch your condition.  Will get help right away if you are not doing well or get worse.

## 2024-02-06 NOTE — Clinical Note
First balloon inflation max pressure = 14 delmy. First balloon inflation duration = 5 seconds. Second inflation of balloon - Max pressure = 16 delmy. 2nd Inflation of balloon - Duration = 8 seconds. 2nd inflation was done at 09:13 EST. Third inflation of balloon - Max pressure = 16 delmy. 3rd Inflation of balloon - Duration = 8 seconds. 3rd inflation was done at 09:13 EST.

## 2024-02-06 NOTE — Clinical Note
First balloon inflation max pressure = 14 demly. First balloon inflation duration = 5 seconds. Second inflation of balloon - Max pressure = 14 delmy. 2nd Inflation of balloon - Duration = 5 seconds. 2nd inflation was done at 09:12 EST. Third inflation of balloon - Max pressure = 14 delmy. 3rd Inflation of balloon - Duration = 5 seconds. 3rd inflation was done at 09:12 EST. Fourth inflation of balloon - Max pressure = 14 delmy. 4th Inflation of  balloon - Duration = 5 seconds. 4th inflation was done at 09:12 EST.

## 2024-02-06 NOTE — NURSING NOTE
Dr. Booker at bedside assessing and talking with patient post-procedure.  will call son per patient request to discuss proceudre with family.

## 2024-02-06 NOTE — Clinical Note
First balloon inflation max pressure = 18 delmy. First balloon inflation duration = 8 seconds. Second inflation of balloon - Max pressure = 20 delmy. 2nd Inflation of balloon - Duration = 10 seconds. 2nd inflation was done at 09:22 EST. Third inflation of balloon - Max pressure = 20 delmy. 3rd Inflation of balloon - Duration = 10 seconds. 3rd inflation was done at 09:22 EST.

## 2024-02-06 NOTE — PLAN OF CARE
Goal Outcome Evaluation:  Plan of Care Reviewed With: patient        Progress: improving  Outcome Evaluation: VSS, AOx4. right femoral site mild bruising otherwise WNL. Ambulating with staff, urinal at bedside. Good appetite and fluid intake. Probable d/c for 2/7/2024. WCTM.

## 2024-02-06 NOTE — Clinical Note
First balloon inflation max pressure = 8 delmy. First balloon inflation duration = 8 seconds. Second inflation of balloon - Max pressure = 14 delmy. 2nd Inflation of balloon - Duration = 8 seconds. 2nd inflation was done at 09:16 EST.

## 2024-02-07 ENCOUNTER — READMISSION MANAGEMENT (OUTPATIENT)
Dept: CALL CENTER | Facility: HOSPITAL | Age: 89
End: 2024-02-07
Payer: MEDICARE

## 2024-02-07 VITALS
TEMPERATURE: 98 F | SYSTOLIC BLOOD PRESSURE: 132 MMHG | RESPIRATION RATE: 16 BRPM | BODY MASS INDEX: 22.35 KG/M2 | HEART RATE: 63 BPM | WEIGHT: 165 LBS | DIASTOLIC BLOOD PRESSURE: 70 MMHG | HEIGHT: 72 IN | OXYGEN SATURATION: 100 %

## 2024-02-07 LAB
ACT BLD: 228 SECONDS (ref 82–152)
ACT BLD: 228 SECONDS (ref 82–152)
ANION GAP SERPL CALCULATED.3IONS-SCNC: 6 MMOL/L (ref 5–15)
BUN SERPL-MCNC: 23 MG/DL (ref 8–23)
BUN/CREAT SERPL: 23.7 (ref 7–25)
CALCIUM SPEC-SCNC: 8.6 MG/DL (ref 8.2–9.6)
CHLORIDE SERPL-SCNC: 109 MMOL/L (ref 98–107)
CO2 SERPL-SCNC: 24 MMOL/L (ref 22–29)
CREAT SERPL-MCNC: 0.97 MG/DL (ref 0.76–1.27)
DEPRECATED RDW RBC AUTO: 42.5 FL (ref 37–54)
EGFRCR SERPLBLD CKD-EPI 2021: 73.2 ML/MIN/1.73
ERYTHROCYTE [DISTWIDTH] IN BLOOD BY AUTOMATED COUNT: 14.4 % (ref 12.3–15.4)
GLUCOSE SERPL-MCNC: 102 MG/DL (ref 65–99)
HCT VFR BLD AUTO: 30.9 % (ref 37.5–51)
HGB BLD-MCNC: 9.9 G/DL (ref 13–17.7)
MCH RBC QN AUTO: 26.1 PG (ref 26.6–33)
MCHC RBC AUTO-ENTMCNC: 32 G/DL (ref 31.5–35.7)
MCV RBC AUTO: 81.3 FL (ref 79–97)
PLATELET # BLD AUTO: 186 10*3/MM3 (ref 140–450)
PMV BLD AUTO: 11.2 FL (ref 6–12)
POTASSIUM SERPL-SCNC: 4.1 MMOL/L (ref 3.5–5.2)
RBC # BLD AUTO: 3.8 10*6/MM3 (ref 4.14–5.8)
SODIUM SERPL-SCNC: 139 MMOL/L (ref 136–145)
WBC NRBC COR # BLD AUTO: 10.78 10*3/MM3 (ref 3.4–10.8)

## 2024-02-07 PROCEDURE — 80048 BASIC METABOLIC PNL TOTAL CA: CPT | Performed by: INTERNAL MEDICINE

## 2024-02-07 PROCEDURE — 99239 HOSP IP/OBS DSCHRG MGMT >30: CPT | Performed by: INTERNAL MEDICINE

## 2024-02-07 PROCEDURE — A9270 NON-COVERED ITEM OR SERVICE: HCPCS | Performed by: INTERNAL MEDICINE

## 2024-02-07 PROCEDURE — G0378 HOSPITAL OBSERVATION PER HR: HCPCS

## 2024-02-07 PROCEDURE — 63710000001 GABAPENTIN 100 MG CAPSULE: Performed by: INTERNAL MEDICINE

## 2024-02-07 PROCEDURE — 63710000001 TAMSULOSIN 0.4 MG CAPSULE: Performed by: INTERNAL MEDICINE

## 2024-02-07 PROCEDURE — 63710000001 METOPROLOL SUCCINATE XL 50 MG TABLET SUSTAINED-RELEASE 24 HOUR: Performed by: INTERNAL MEDICINE

## 2024-02-07 PROCEDURE — 63710000001 PANTOPRAZOLE 40 MG TABLET DELAYED-RELEASE: Performed by: INTERNAL MEDICINE

## 2024-02-07 PROCEDURE — 63710000001 ASPIRIN 81 MG TABLET DELAYED-RELEASE: Performed by: INTERNAL MEDICINE

## 2024-02-07 PROCEDURE — 63710000001 CLOPIDOGREL 75 MG TABLET: Performed by: INTERNAL MEDICINE

## 2024-02-07 PROCEDURE — 85027 COMPLETE CBC AUTOMATED: CPT | Performed by: INTERNAL MEDICINE

## 2024-02-07 RX ADMIN — CLOPIDOGREL BISULFATE 75 MG: 75 TABLET, FILM COATED ORAL at 09:04

## 2024-02-07 RX ADMIN — GABAPENTIN 200 MG: 100 CAPSULE ORAL at 09:04

## 2024-02-07 RX ADMIN — ASPIRIN 81 MG: 81 TABLET, COATED ORAL at 09:04

## 2024-02-07 RX ADMIN — TAMSULOSIN HYDROCHLORIDE 0.4 MG: 0.4 CAPSULE ORAL at 09:04

## 2024-02-07 RX ADMIN — METOPROLOL SUCCINATE 25 MG: 50 TABLET, FILM COATED, EXTENDED RELEASE ORAL at 09:04

## 2024-02-07 RX ADMIN — PANTOPRAZOLE SODIUM 40 MG: 40 TABLET, DELAYED RELEASE ORAL at 09:04

## 2024-02-07 NOTE — DISCHARGE SUMMARY
"  Date of Discharge:  2/7/2024  Date of Admit: 2/6/2024    Discharge Diagnosis  1.  Unstable angina  2.  Abnormal stress test  3.  Coronary artery disease with prior PCI to the ostial to mid-RCA. Successful  LAD  4.  Mild aortic valve stenosis  5.  Mild to moderate tricuspid valve regurgitation    Hospital Course:   89-year-old male with a medical history of chest pain, mild aortic valve stenosis, fixed inferoseptal to apical defect on stress test on my review, who initially presented to me with unstable angina.   He he was taken for coronary angiography and noted to have a severe 80 to 90% calcified stenosis of the ostial to mid RCA and a  of the LAD.  Patient underwent prior PCI of the RCA and was brought back for intervention to the mid LAD  which was successful.  He tolerated this well.  He has a slight decrease in his hematocrit today but overall looks good.  Continue dual antiplatelet therapy.  For discharge home today.    Procedures Performed  Procedure(s):  Percutaneous Coronary Intervention  Stent JAM coronary     Conclusions:   1.  Successful PCI of the mid LAD with a 3.25 x 38 mm Xience yony point drug-eluting stent, postdilated to high-pressure with a 3.5 x 20 mm noncompliant trek balloon    Consults       No orders found for last 30 day(s).                Discharge Physical Exam:    Temp:  [97.7 °F (36.5 °C)-98.9 °F (37.2 °C)] 98 °F (36.7 °C)  Heart Rate:  [62-77] 63  Resp:  [10-18] 16  BP: (103-144)/(53-95) 132/70    Intake/Output Summary (Last 24 hours) at 2/7/2024 0954  Last data filed at 2/7/2024 0846  Gross per 24 hour   Intake 990 ml   Output 1500 ml   Net -510 ml     Flowsheet Rows      Flowsheet Row First Filed Value   Admission Height 182.9 cm (72\") Documented at 02/06/2024 0733   Admission Weight 74.8 kg (165 lb) Documented at 02/06/2024 0733            General Appearance:    Alert, cooperative, in no acute distress   Head:    Normocephalic, without obvious abnormality, atraumatic     "   Neck/Lymph   No adenopathy, supple, no thyromegaly, no carotid bruit, no    JVD   Lungs:     Clear to auscultation bilaterally, no wheezes, rales, or     rhonchi    Cardiac:    Normal rate, regular rhythm, no murmur, no rub, no gallop   Chest Wall:    No abnormalities observed   GI:     Normal bowel sounds, soft, nontender, nondistended,            no rebound tenderness   Extremities:   No cyanosis, clubbing, or edema   Circulatory/Peripheral Vascular :   Pulses palpable and equal bilaterally   Integumentary:   No bleeding or rash. Normal temperature                Discharge Medications     Discharge Medications        Continue These Medications        Instructions Start Date   Aspirin Low Dose 81 MG EC tablet  Generic drug: aspirin   TAKE ONE TABLET BY MOUTH DAILY      atorvastatin 40 MG tablet  Commonly known as: LIPITOR   40 mg, Oral, Nightly      calcium carbonate-cholecalciferol 500-400 MG-UNIT tablet tablet   1 tablet, Oral, Daily      clopidogrel 75 MG tablet  Commonly known as: PLAVIX   75 mg, Oral, Daily      gabapentin 100 MG capsule  Commonly known as: NEURONTIN   200 mg, Oral, Daily, Can increase by 100 mg every week. Max is 600mg      isosorbide mononitrate 30 MG 24 hr tablet  Commonly known as: IMDUR   30 mg, Oral, Daily      metoprolol succinate XL 25 MG 24 hr tablet  Commonly known as: TOPROL-XL   25 mg, Oral, Daily      nitroglycerin 0.4 MG SL tablet  Commonly known as: NITROSTAT   0.4 mg, Sublingual, Every 5 Minutes PRN, Take no more than 3 doses in 15 minutes.      pantoprazole 40 MG EC tablet  Commonly known as: PROTONIX   TAKE ONE TABLET BY MOUTH DAILY      pramipexole 1.5 MG tablet  Commonly known as: MIRAPEX   TAKE ONE TABLET BY MOUTH ONCE NIGHTLY      tamsulosin 0.4 MG capsule 24 hr capsule  Commonly known as: FLOMAX   0.4 mg, Oral, 2 Times Daily               Discharge Diet: Cardiac, carb controlled    Activity at Discharge: No lifting greater than 10 lbs 1 week    Discharge disposition:  home    Condition on Discharge: stable    Follow-up Appointments  Future Appointments   Date Time Provider Department Center   2/14/2024 11:30 AM Reji Gilman MD [id number]   3/25/2024 10:00 AM Reji Gilman MD [id number]         Test Results Pending at Discharge       Froylan Booker MD  02/07/24  09:53 EST    Greater than 30 min spent in reviewing records, discussion and examination of the patient and discussion with other members of the patient's medical team.     Dictated utilizing Dragon dictation

## 2024-02-07 NOTE — PLAN OF CARE
Goal Outcome Evaluation:  Plan of Care Reviewed With: patient           Outcome Evaluation: A/O, complains of throat pain, bruising noted to right femoral site, drsg CDI, VSS, possible dc today, continue plan of care.

## 2024-02-07 NOTE — OUTREACH NOTE
Prep Survey      Flowsheet Row Responses   [facility] patient discharged from? [place]   Is LACE score < 7 ? Yes   Eligibility [facility]   Date of Admission 02/06/24   Date of Discharge 02/07/24   Discharge Disposition Home or Self Care   Discharge diagnosis unstable angina, heart cath with stent   Does the patient have one of the following disease processes/diagnoses(primary or secondary)? Other   Does the patient have Home health ordered? No   Is there a DME ordered? No   Prep survey completed? Yes            Cassi LANZA - Registered Nurse

## 2024-02-08 ENCOUNTER — TRANSITIONAL CARE MANAGEMENT TELEPHONE ENCOUNTER (OUTPATIENT)
Dept: CALL CENTER | Facility: HOSPITAL | Age: 89
End: 2024-02-08
Payer: MEDICARE

## 2024-02-08 ENCOUNTER — TELEMEDICINE (OUTPATIENT)
Dept: INTERNAL MEDICINE | Facility: CLINIC | Age: 89
End: 2024-02-08
Payer: MEDICARE

## 2024-02-08 ENCOUNTER — TELEPHONE (OUTPATIENT)
Dept: INTERNAL MEDICINE | Facility: CLINIC | Age: 89
End: 2024-02-08

## 2024-02-08 DIAGNOSIS — J02.9 SORE THROAT: Primary | ICD-10-CM

## 2024-02-08 PROCEDURE — 1160F RVW MEDS BY RX/DR IN RCRD: CPT | Performed by: INTERNAL MEDICINE

## 2024-02-08 PROCEDURE — 1159F MED LIST DOCD IN RCRD: CPT | Performed by: INTERNAL MEDICINE

## 2024-02-08 PROCEDURE — 99213 OFFICE O/P EST LOW 20 MIN: CPT | Performed by: INTERNAL MEDICINE

## 2024-02-08 RX ORDER — PANTOPRAZOLE SODIUM 40 MG/1
TABLET, DELAYED RELEASE ORAL
Qty: 90 TABLET | Refills: 2 | Status: SHIPPED | OUTPATIENT
Start: 2024-02-08

## 2024-02-08 RX ORDER — AMOXICILLIN 500 MG/1
500 CAPSULE ORAL 3 TIMES DAILY
Qty: 21 CAPSULE | Refills: 0 | Status: SHIPPED | OUTPATIENT
Start: 2024-02-08

## 2024-02-08 NOTE — PROGRESS NOTES
Mode of Visit: Video  Location of patient: home  Location of provider: [facility] clinic  You have chosen to receive care through a telehealth visit.  Does the patient consent to use a video/audio connection for your medical care today? Yes  The visit included audio and video interaction. No technical issues occurred during this visit.

## 2024-02-08 NOTE — OUTREACH NOTE
Call Center TCM Note      Flowsheet Row Responses   [facility] patient discharged from? [place]   Does the patient have one of the following disease processes/diagnoses(primary or secondary)? Other   TCM attempt successful? No   Unsuccessful attempts Attempt 1  [No updated verbal release on file from PCP group]            Gely Llanos RN    2/8/2024, 10:57 EST

## 2024-02-08 NOTE — OUTREACH NOTE
Call Center TCM Note      Flowsheet Row Responses   [facility] patient discharged from? [place]   Does the patient have one of the following disease processes/diagnoses(primary or secondary)? Other   TCM attempt successful? No   Unsuccessful attempts Attempt 2            Gely Llanos RN    2/8/2024, 14:08 EST

## 2024-02-08 NOTE — TELEPHONE ENCOUNTER
Caller: Jim Romero    Relationship: Self    Best call back number: [phone]     What medication are you requesting: NA    What are your current symptoms: SORE THROAT    How long have you been experiencing symptoms: STARTED DURING SURGERY 2/7    Have you had these symptoms before:    [] Yes  [x] No    Have you been treated for these symptoms before:   [] Yes  [x] No    If a prescription is needed, what is your preferred pharmacy and phone number: [facility] [id number] - [address] AT [facility] & ([facility] [phone] [facility] [phone] FX     Additional notes:  PLEASE CALL AND ADVISE

## 2024-02-08 NOTE — PROGRESS NOTES
Subjective   Jim Romero is a 89 y.o. male.     Chief Complaint   Patient presents with    Sore Throat         Sore Throat   This is a new problem. The current episode started in the past 7 days. The problem has been unchanged.   Pain is worse on both side(s). Pertinent negatives include no coughing.        The following portions of the patient's history were reviewed and updated as appropriate: allergies, current medications, past social history and problem list.    No outpatient medications have been marked as taking for the 2/8/24 encounter (Telemedicine) with Reji Gilman MD.       Review of Systems   Constitutional:  Negative for chills and fever.   HENT:  Positive for sore throat. Negative for postnasal drip and rhinorrhea.    Respiratory:  Negative for cough.        Objective   There were no vitals filed for this visit.   Wt Readings from Last 3 Encounters:   02/06/24 74.8 kg (165 lb)   01/30/24 74.8 kg (165 lb)   01/25/24 77.6 kg (171 lb)    There is no height or weight on file to calculate BMI.      Physical Exam  Constitutional:       Appearance: Normal appearance.   Pulmonary:      Effort: Pulmonary effort is normal.   Neurological:      Mental Status: He is alert.   Psychiatric:         Mood and Affect: Mood normal.         Behavior: Behavior normal.         Thought Content: Thought content normal.           Problems Addressed this Visit    None  Visit Diagnoses       Sore throat    -  Primary          Diagnoses         Codes Comments    Sore throat    -  Primary ICD-10-CM: J02.9  ICD-9-CM: 462           Assessment & Plan   Video visit today due to COVID pandemic.  He has had a sore throat for 2 days.  Began 2 days ago following a stent into his LAD.  It hurts to swallow.  No fever or chills.  No cough.  There is no way to examine the throat today.  He is extremely hard of hearing so communication is difficult.  Will go ahead and treat with amoxicillin 500 mg 3 times daily for 1 week.  Throat  lozenges.  Warm salt water gargles.  1CloudStar platform used for the visit today.    The above information was reviewed again today 02/08/24.  It continues to be accurate as reflected above and is unchanged.  History, physical and review of systems all reviewed and are unchanged.  Medications were reviewed today and continue the current dosing.               Dragon disclaimer:   Part of this note may be an electronic transcription/translation of spoken language to printed text using the Dragon Dictation System.

## 2024-02-08 NOTE — PROGRESS NOTES
Mode of Visit: Video  Location of patient: home  Location of provider: [facility] clinic  You have chosen to receive care through a telehealth visit.  Does the patient consent to use a video/audio connection for your medical care today? Yes  The visit included audio and video interaction. No technical issues occurred during this visit.

## 2024-02-09 ENCOUNTER — HOSPITAL ENCOUNTER (OUTPATIENT)
Dept: CARDIOLOGY | Facility: HOSPITAL | Age: 89
Discharge: HOME | End: 2024-02-09
Payer: MEDICARE

## 2024-02-09 ENCOUNTER — TRANSCRIBE ORDERS (OUTPATIENT)
Dept: ADMINISTRATIVE | Facility: HOSPITAL | Age: 89
End: 2024-02-09
Payer: MEDICARE

## 2024-02-09 ENCOUNTER — TRANSITIONAL CARE MANAGEMENT TELEPHONE ENCOUNTER (OUTPATIENT)
Dept: CALL CENTER | Facility: HOSPITAL | Age: 89
End: 2024-02-09
Payer: MEDICARE

## 2024-02-09 ENCOUNTER — OFFICE VISIT (OUTPATIENT)
Dept: CARDIOLOGY | Facility: CLINIC | Age: 89
End: 2024-02-09
Payer: MEDICARE

## 2024-02-09 VITALS
HEART RATE: 76 BPM | BODY MASS INDEX: 22.75 KG/M2 | HEIGHT: 72 IN | SYSTOLIC BLOOD PRESSURE: 130 MMHG | WEIGHT: 168 LBS | DIASTOLIC BLOOD PRESSURE: 60 MMHG | OXYGEN SATURATION: 98 %

## 2024-02-09 DIAGNOSIS — I72.4 PSEUDOANEURYSM OF FEMORAL ARTERY: Primary | ICD-10-CM

## 2024-02-09 DIAGNOSIS — I35.0 AORTIC STENOSIS, MILD: Chronic | ICD-10-CM

## 2024-02-09 DIAGNOSIS — I25.118 CORONARY ARTERY DISEASE OF NATIVE ARTERY OF NATIVE HEART WITH STABLE ANGINA PECTORIS: ICD-10-CM

## 2024-02-09 DIAGNOSIS — I72.4 PSEUDOANEURYSM OF FEMORAL ARTERY: ICD-10-CM

## 2024-02-09 LAB
BH CV RIGHT GROIN PSA PROCEDURE SCRIPTING LRR: 1
BH CV XLRA MEAS EXT ILIAC A PSV RIGHT: 64.1 CM/SEC
PROX PFA PSV RIGHT: 57.5 CM/SEC
PROX SFA PSV RIGHT: 102 CM/SEC
RIGHT GROIN CFA SYS: 58 CM/SEC

## 2024-02-09 PROCEDURE — 99214 OFFICE O/P EST MOD 30 MIN: CPT | Performed by: NURSE PRACTITIONER

## 2024-02-09 PROCEDURE — 93926 LOWER EXTREMITY STUDY: CPT

## 2024-02-09 PROCEDURE — 1159F MED LIST DOCD IN RCRD: CPT | Performed by: NURSE PRACTITIONER

## 2024-02-09 PROCEDURE — 1160F RVW MEDS BY RX/DR IN RCRD: CPT | Performed by: NURSE PRACTITIONER

## 2024-02-09 PROCEDURE — 93000 ELECTROCARDIOGRAM COMPLETE: CPT | Performed by: NURSE PRACTITIONER

## 2024-02-09 NOTE — OUTREACH NOTE
Call Center TCM Note      Flowsheet Row Responses   [facility] patient discharged from? [place]   Does the patient have one of the following disease processes/diagnoses(primary or secondary)? Other   TCM attempt successful? Yes   Call start time 0828   Call end time 0836   Discharge diagnosis unstable angina, heart cath with stent   Is patient permission given to speak with other caregiver? Yes   Person spoke with today (if not patient) and relationship spouse, Deepika Romero (Patient hard of hearing)   Meds reviewed with patient/caregiver? Yes   Does the patient have all medications ordered at discharge? N/A  [No new meds ordered at discharge.]   Is the patient taking all medications as directed (includes completed medication regime)? Yes   Medication comments Patient ordered new amoxicillin yesterday with teleheath appt with PCP for sore throat   Comments PCP Dr Gilman. Hospital follow up in place for 2/14/24  1130am   Does the patient have an appointment with their PCP within 7-14 days of discharge? Yes   Has home health visited the patient within 72 hours of discharge? N/A   Psychosocial issues? No   Did the patient receive a copy of their discharge instructions? Yes   Nursing interventions Reviewed instructions with patient  [wife]   What is the patient's perception of their health status since discharge? New symptoms unrelated to diagnosis  [patient has sore throat starting day after hospital stay. Patient to take antibiotic as prescribed and use warm salt water gargles and lozenges as need per PCP instruction with telehealth appt yesterday. patient also with issue with groin cath site.]   Is the patient/caregiver able to teach back signs and symptoms related to disease process for when to call PCP? Yes   Is the patient/caregiver able to teach back signs and symptoms related to disease process for when to call 911? Yes   Is the patient/caregiver able to teach back the hierarchy of who to call/visit for  symptoms/problems? PCP, Specialist, Home health nurse, Urgent Care, ED, 911 Yes  [patient seeing cardiology today for wound check due to patient having increased bruising, swelling to area.]   If the patient is a current smoker, are they able to teach back resources for cessation? Not a smoker   TCM call completed? Yes   Wrap up additional comments Cardiology wound check appt today, Cardiology hospital follow up 2/15   Call end time 0836   Would this patient benefit from a Referral to [facility] Social Work? No   Is the patient interested in additional calls from an ambulatory ? No            Nayeli Magallon RN    2/9/2024, 08:41 EST

## 2024-02-09 NOTE — PROGRESS NOTES
Date of Office Visit: 2024  Encounter Provider: LONNY Balderas  Place of Service: [facility] CARDIOLOGY  Patient Name: Jim Romero  :3/17/1931    Chief Complaint   Patient presents with    groin pain and bruising post cath   :     HPI: Jim Romero is a 89 y.o. male who is a patient of Dr. Booker and is new to me today.  He has a history of chest pain, mild aortic valve stenosis and inferoseptal to apical defect on stress test.  He came to see Dr. Booker for unstable angina he been having more frequency of chest pain and he went for cardiac cath.  Coronary angiography was noted to have a severe 80 to 90% calcified stenosis of the ostial RCA and a  of the LAD.  He had been intolerant to Imdur and continued to have angina despite medical management.  He received 2 overlapping drug-eluting stents to the RCA was brought back for a second procedure on his LAD.  He received a  of the LAD with a 3.25 x 38 mm Xience drug-eluting stent.    I was on-call yesterday evening patient called he noticed some significant bruising in his right groin he sent me a picture and it appeared that there was some swelling.  Bring him into the office today it is tender to touch.  Previous testing and notes have been reviewed by me.   Past Medical History:   Diagnosis Date    Benign prostatic hyperplasia     GERD (gastroesophageal reflux disease)     Medication management     RLS (restless legs syndrome)     Sciatica        Past Surgical History:   Procedure Laterality Date    CARDIAC CATHETERIZATION N/A 2024    Procedure: Coronary angiography, Left heart catheterization, Left ventricular angiography;  Surgeon: Froylan Booker MD;  Location: [facility] CATH INVASIVE LOCATION;  Service: Cardiology;  Laterality: N/A;    CARDIAC CATHETERIZATION N/A 2024    Procedure: Percutaneous Coronary Intervention;  Surgeon: Froylan Booker MD;  Location: [facility] CATH INVASIVE  LOCATION;  Service: Cardiology;  Laterality: N/A;    CARDIAC CATHETERIZATION N/A 2024    Procedure: Stent JAM coronary;  Surgeon: Froylan Booker MD;  Location:  [facility] CATH INVASIVE LOCATION;  Service: Cardiology;  Laterality: N/A;    CARDIAC CATHETERIZATION N/A 2024    Procedure: Percutaneous Coronary Intervention;  Surgeon: Froylan Booker MD;  Location:  [facility] CATH INVASIVE LOCATION;  Service: Cardiology;  Laterality: N/A;    CARDIAC CATHETERIZATION N/A 2024    Procedure: Stent JAM coronary;  Surgeon: Froylan Booker MD;  Location:  [facility] CATH INVASIVE LOCATION;  Service: Cardiology;  Laterality: N/A;    CERVICAL FUSION POSTERIOR WITH WIRING      age 14    HERNIA REPAIR      LUNG REMOVAL, PARTIAL Left     SKIN CANCER EXCISION      TONSILLECTOMY         Social History     Socioeconomic History    Marital status:    Tobacco Use    Smoking status: Former     Packs/day: 1.5     Types: Cigarettes     Start date:      Quit date:      Years since quittin.1     Passive exposure: Past    Smokeless tobacco: Never   Vaping Use    Vaping Use: Never used   Substance and Sexual Activity    Alcohol use: Yes     Comment: 1-2 x month    Drug use: No    Sexual activity: Defer       Family History   Problem Relation Age of Onset    Melanoma Mother 77    No Known Problems Father        Review of Systems   Constitutional: Negative for diaphoresis and malaise/fatigue.   Cardiovascular:  Negative for chest pain, claudication, dyspnea on exertion, irregular heartbeat, leg swelling, near-syncope, orthopnea, palpitations, paroxysmal nocturnal dyspnea and syncope.        Groin pain and bruising   Respiratory:  Negative for cough, shortness of breath and sleep disturbances due to breathing.    Musculoskeletal:  Negative for falls.   Neurological:  Negative for dizziness and weakness.   Psychiatric/Behavioral:  Negative for altered mental status and substance abuse.        No Known  "Allergies      Current Outpatient Medications:     amoxicillin (AMOXIL) 500 MG capsule, Take 1 capsule by mouth 3 (Three) Times a Day., Disp: 21 capsule, Rfl: 0    Aspirin Low Dose 81 MG EC tablet, TAKE ONE TABLET BY MOUTH DAILY, Disp: 90 tablet, Rfl: 3    atorvastatin (LIPITOR) 40 MG tablet, Take 1 tablet by mouth Every Night., Disp: 90 tablet, Rfl: 3    calcium carbonate-cholecalciferol 500-400 MG-UNIT tablet tablet, Take 1 tablet by mouth Daily., Disp: , Rfl:     clopidogrel (PLAVIX) 75 MG tablet, Take 1 tablet by mouth Daily., Disp: 90 tablet, Rfl: 3    gabapentin (NEURONTIN) 100 MG capsule, Take 2 capsules by mouth Daily. Can increase by 100 mg every week. Max is 600mg, Disp: , Rfl:     isosorbide mononitrate (IMDUR) 30 MG 24 hr tablet, TAKE ONE TABLET BY MOUTH DAILY, Disp: 90 tablet, Rfl: 3    metoprolol succinate XL (TOPROL-XL) 25 MG 24 hr tablet, TAKE ONE TABLET BY MOUTH DAILY, Disp: 90 tablet, Rfl: 3    nitroglycerin (NITROSTAT) 0.4 MG SL tablet, Place 1 tablet under the tongue Every 5 (Five) Minutes As Needed for Chest Pain (Systolic BP Greater Than 100). Take no more than 3 doses in 15 minutes., Disp: 30 tablet, Rfl: 0    pantoprazole (PROTONIX) 40 MG EC tablet, TAKE ONE TABLET BY MOUTH DAILY, Disp: 90 tablet, Rfl: 2    pramipexole (MIRAPEX) 1.5 MG tablet, TAKE ONE TABLET BY MOUTH ONCE NIGHTLY, Disp: 90 tablet, Rfl: 1    tamsulosin (FLOMAX) 0.4 MG capsule 24 hr capsule, TAKE 1 CAPSULE BY MOUTH TWICE A DAY, Disp: 180 capsule, Rfl: 1      Objective:     Vitals:    02/09/24 1120   BP: 130/60   Pulse: 76   SpO2: 98%   Weight: 76.2 kg (168 lb)   Height: 182.9 cm (72\")     Body mass index is 22.78 kg/m².    PHYSICAL EXAM:    Constitutional:       General: Not in acute distress.     Appearance: Normal appearance. Well-developed.   Eyes:      Pupils: Pupils are equal, round, and reactive to light.   HENT:      Head: Normocephalic.   Neck:      Vascular: No carotid bruit or JVD.   Pulmonary:      Effort: Pulmonary " effort is normal. No tachypnea.      Breath sounds: Normal breath sounds. No wheezing. No rales.   Cardiovascular:      Normal rate. Regular rhythm.      No gallop.       Comments: Right groin bruising. Positive thrill and bruit  Pulses:     Intact distal pulses.   Edema:     Peripheral edema absent.   Abdominal:      General: Bowel sounds are normal.      Palpations: Abdomen is soft.      Tenderness: There is no abdominal tenderness.   Musculoskeletal: Normal range of motion.      Cervical back: Normal range of motion and neck supple. No edema. Skin:     General: Skin is warm and dry.   Neurological:      Mental Status: Alert and oriented to person, place, and time.           ECG 12 Lead    Date/Time: 2/9/2024 12:01 PM  Performed by: Precious Mcdonnell APRN    Authorized by: Precious Mcdonnell APRN  Comparison: compared with previous ECG from 2/6/2024  Similar to previous ECG  Rhythm: sinus rhythm  Rate: normal  Q waves: V1, V2 and V3    QRS axis: normal  Other findings: non-specific ST-T wave changes, left ventricular hypertrophy and poor R wave progression    Clinical impression: abnormal EKG            Assessment:       Diagnosis Plan   1. Pseudoaneurysm of femoral artery  Duplex Pseudoaneurysm CAR   We will send for stat ultrasound right groin with vascular surgery to inject if he misses him.   2. Coronary artery disease of native artery of native heart with stable angina pectoris     Continue with aspirin and Plavix.   3. Aortic stenosis, mild          Orders Placed This Encounter   Procedures    ECG 12 Lead     This order was created via procedure documentation     Order Specific Question:   Release to patient     Answer:   Routine Release [[id number]]          Plan:       Keep follow-up for next week         Your medication list            Accurate as of February 9, 2024 12:02 PM. If you have any questions, ask your nurse or doctor.                CONTINUE taking these medications        Instructions Last  Dose Given Next Dose Due   amoxicillin 500 MG capsule  Commonly known as: AMOXIL      Take 1 capsule by mouth 3 (Three) Times a Day.       Aspirin Low Dose 81 MG EC tablet  Generic drug: aspirin      TAKE ONE TABLET BY MOUTH DAILY       atorvastatin 40 MG tablet  Commonly known as: LIPITOR      Take 1 tablet by mouth Every Night.       calcium carbonate-cholecalciferol 500-400 MG-UNIT tablet tablet      Take 1 tablet by mouth Daily.       clopidogrel 75 MG tablet  Commonly known as: PLAVIX      Take 1 tablet by mouth Daily.       gabapentin 100 MG capsule  Commonly known as: NEURONTIN      Take 2 capsules by mouth Daily. Can increase by 100 mg every week. Max is 600mg       isosorbide mononitrate 30 MG 24 hr tablet  Commonly known as: IMDUR      TAKE ONE TABLET BY MOUTH DAILY       metoprolol succinate XL 25 MG 24 hr tablet  Commonly known as: TOPROL-XL      TAKE ONE TABLET BY MOUTH DAILY       nitroglycerin 0.4 MG SL tablet  Commonly known as: NITROSTAT      Place 1 tablet under the tongue Every 5 (Five) Minutes As Needed for Chest Pain (Systolic BP Greater Than 100). Take no more than 3 doses in 15 minutes.       pantoprazole 40 MG EC tablet  Commonly known as: PROTONIX      TAKE ONE TABLET BY MOUTH DAILY       pramipexole 1.5 MG tablet  Commonly known as: MIRAPEX      TAKE ONE TABLET BY MOUTH ONCE NIGHTLY       tamsulosin 0.4 MG capsule 24 hr capsule  Commonly known as: FLOMAX      TAKE 1 CAPSULE BY MOUTH TWICE A DAY                  As always, it has been a pleasure to participate in your patient's care.      Sincerely,     Precious MUKHERJEE

## 2024-02-14 ENCOUNTER — OFFICE VISIT (OUTPATIENT)
Dept: INTERNAL MEDICINE | Facility: CLINIC | Age: 89
End: 2024-02-14
Payer: MEDICARE

## 2024-02-14 VITALS
DIASTOLIC BLOOD PRESSURE: 60 MMHG | RESPIRATION RATE: 16 BRPM | OXYGEN SATURATION: 96 % | HEIGHT: 72 IN | SYSTOLIC BLOOD PRESSURE: 110 MMHG | TEMPERATURE: 97.5 F | BODY MASS INDEX: 23.57 KG/M2 | WEIGHT: 174 LBS | HEART RATE: 76 BPM

## 2024-02-14 DIAGNOSIS — I25.118 CORONARY ARTERY DISEASE OF NATIVE ARTERY OF NATIVE HEART WITH STABLE ANGINA PECTORIS: Primary | ICD-10-CM

## 2024-02-14 DIAGNOSIS — I35.0 AORTIC STENOSIS, MILD: Chronic | ICD-10-CM

## 2024-02-15 ENCOUNTER — OFFICE VISIT (OUTPATIENT)
Dept: CARDIOLOGY | Facility: CLINIC | Age: 89
End: 2024-02-15
Payer: MEDICARE

## 2024-02-15 VITALS
OXYGEN SATURATION: 96 % | WEIGHT: 172.8 LBS | SYSTOLIC BLOOD PRESSURE: 90 MMHG | BODY MASS INDEX: 23.4 KG/M2 | DIASTOLIC BLOOD PRESSURE: 58 MMHG | HEIGHT: 72 IN | HEART RATE: 70 BPM

## 2024-02-15 DIAGNOSIS — Z95.5 S/P DRUG ELUTING CORONARY STENT PLACEMENT: ICD-10-CM

## 2024-02-15 DIAGNOSIS — I35.0 AORTIC STENOSIS, MILD: Primary | Chronic | ICD-10-CM

## 2024-02-15 DIAGNOSIS — I25.118 CORONARY ARTERY DISEASE OF NATIVE ARTERY OF NATIVE HEART WITH STABLE ANGINA PECTORIS: ICD-10-CM

## 2024-02-20 ENCOUNTER — TELEPHONE (OUTPATIENT)
Dept: CARDIOLOGY | Facility: CLINIC | Age: 89
End: 2024-02-20
Payer: MEDICARE

## 2024-02-20 NOTE — TELEPHONE ENCOUNTER
2/20/24   Pt called wanting to know if he is OK to Play golf on next Tuesday?   He states he will be riding in cart and He want to know if Ok to swing a club?   He recently had staged PCI on 1/30/24 and 2/6/24.   He states cath site if good, did have some  black and blue bruising that is starting to fade. There is a small knot at insertion site and it has no fever, no redness, or any oozing from site.   Please advise  Pt's call back # [phone]   Thanks Marino DE LEÓN

## 2024-03-25 ENCOUNTER — HOSPITAL ENCOUNTER (OUTPATIENT)
Dept: ULTRASOUND IMAGING | Facility: HOSPITAL | Age: 89
Discharge: HOME | End: 2024-03-25
Payer: MEDICARE

## 2024-03-25 ENCOUNTER — LAB (OUTPATIENT)
Dept: LAB | Facility: HOSPITAL | Age: 89
End: 2024-03-25
Payer: MEDICARE

## 2024-03-25 ENCOUNTER — OFFICE VISIT (OUTPATIENT)
Dept: INTERNAL MEDICINE | Facility: CLINIC | Age: 89
End: 2024-03-25
Payer: MEDICARE

## 2024-03-25 VITALS
BODY MASS INDEX: 23.03 KG/M2 | SYSTOLIC BLOOD PRESSURE: 110 MMHG | HEART RATE: 63 BPM | WEIGHT: 170 LBS | HEIGHT: 72 IN | RESPIRATION RATE: 16 BRPM | TEMPERATURE: 98 F | OXYGEN SATURATION: 98 % | DIASTOLIC BLOOD PRESSURE: 62 MMHG

## 2024-03-25 DIAGNOSIS — I71.43 INFRARENAL ABDOMINAL AORTIC ANEURYSM (AAA) WITHOUT RUPTURE: ICD-10-CM

## 2024-03-25 DIAGNOSIS — D64.9 ANEMIA, UNSPECIFIED TYPE: ICD-10-CM

## 2024-03-25 DIAGNOSIS — R73.02 IGT (IMPAIRED GLUCOSE TOLERANCE): Chronic | ICD-10-CM

## 2024-03-25 DIAGNOSIS — I35.0 AORTIC STENOSIS, MILD: Primary | Chronic | ICD-10-CM

## 2024-03-25 DIAGNOSIS — I25.118 CORONARY ARTERY DISEASE OF NATIVE ARTERY OF NATIVE HEART WITH STABLE ANGINA PECTORIS: ICD-10-CM

## 2024-03-25 DIAGNOSIS — Z00.00 ENCOUNTER FOR ANNUAL WELLNESS EXAM IN MEDICARE PATIENT: ICD-10-CM

## 2024-03-25 PROBLEM — R07.9 CHEST PAIN: Status: RESOLVED | Noted: 2022-11-15 | Resolved: 2024-03-25

## 2024-03-25 LAB
BASOPHILS # BLD AUTO: 0.05 10*3/MM3 (ref 0–0.2)
BASOPHILS NFR BLD AUTO: 0.6 % (ref 0–1.5)
CHOLEST SERPL-MCNC: 93 MG/DL (ref 0–200)
DEPRECATED RDW RBC AUTO: 40.8 FL (ref 37–54)
EOSINOPHIL # BLD AUTO: 0.21 10*3/MM3 (ref 0–0.4)
EOSINOPHIL NFR BLD AUTO: 2.7 % (ref 0.3–6.2)
ERYTHROCYTE [DISTWIDTH] IN BLOOD BY AUTOMATED COUNT: 13.8 % (ref 12.3–15.4)
GLUCOSE SERPL-MCNC: 70 MG/DL (ref 65–99)
HBA1C MFR BLD: 6 % (ref 4.8–5.6)
HCT VFR BLD AUTO: 34 % (ref 37.5–51)
HDLC SERPL QL: 2.21
HDLC SERPL-MCNC: 42 MG/DL (ref 40–60)
HGB BLD-MCNC: 10.7 G/DL (ref 13–17.7)
IMM GRANULOCYTES # BLD AUTO: 0.02 10*3/MM3 (ref 0–0.05)
IMM GRANULOCYTES NFR BLD AUTO: 0.3 % (ref 0–0.5)
LDLC SERPL CALC-MCNC: 41 MG/DL (ref 0–100)
LYMPHOCYTES # BLD AUTO: 1.44 10*3/MM3 (ref 0.7–3.1)
LYMPHOCYTES NFR BLD AUTO: 18.5 % (ref 19.6–45.3)
MCH RBC QN AUTO: 25.5 PG (ref 26.6–33)
MCHC RBC AUTO-ENTMCNC: 31.5 G/DL (ref 31.5–35.7)
MCV RBC AUTO: 81.1 FL (ref 79–97)
MONOCYTES # BLD AUTO: 0.55 10*3/MM3 (ref 0.1–0.9)
MONOCYTES NFR BLD AUTO: 7.1 % (ref 5–12)
NEUTROPHILS NFR BLD AUTO: 5.52 10*3/MM3 (ref 1.7–7)
NEUTROPHILS NFR BLD AUTO: 70.8 % (ref 42.7–76)
NRBC BLD AUTO-RTO: 0 /100 WBC (ref 0–0.2)
PLATELET # BLD AUTO: 209 10*3/MM3 (ref 140–450)
PMV BLD AUTO: 11.4 FL (ref 6–12)
RBC # BLD AUTO: 4.19 10*6/MM3 (ref 4.14–5.8)
TRIGL SERPL-MCNC: 35 MG/DL (ref 0–150)
VLDLC SERPL-MCNC: 10 MG/DL (ref 5–40)
WBC NRBC COR # BLD AUTO: 7.79 10*3/MM3 (ref 3.4–10.8)

## 2024-03-25 PROCEDURE — 80061 LIPID PANEL: CPT | Performed by: INTERNAL MEDICINE

## 2024-03-25 PROCEDURE — 85025 COMPLETE CBC W/AUTO DIFF WBC: CPT | Performed by: INTERNAL MEDICINE

## 2024-03-25 PROCEDURE — 83036 HEMOGLOBIN GLYCOSYLATED A1C: CPT | Performed by: INTERNAL MEDICINE

## 2024-03-25 PROCEDURE — 82947 ASSAY GLUCOSE BLOOD QUANT: CPT | Performed by: INTERNAL MEDICINE

## 2024-03-25 PROCEDURE — 76775 US EXAM ABDO BACK WALL LIM: CPT

## 2024-03-25 PROCEDURE — 36415 COLL VENOUS BLD VENIPUNCTURE: CPT | Performed by: INTERNAL MEDICINE

## 2024-03-25 NOTE — PROGRESS NOTES
Subjective   Jim Romero is a 89 y.o. male.     Chief Complaint   Patient presents with    Medicare Wellness-subsequent    Hyperglycemia    Restless Legs Syndrome    Osteoporosis    Heartburn         Hyperglycemia  This is a chronic problem. The current episode started more than 1 year ago. Associated symptoms include fatigue.   Osteoporosis  This is a chronic problem. Associated symptoms include fatigue.   Fatigue  This is a chronic problem. Associated symptoms include fatigue.   Arthritis  Presents for follow-up visit. The symptoms have been stable. Affected locations include the right knee, left knee, left DIP and right hip. Associated symptoms include fatigue. Pertinent negatives include no diarrhea.        The following portions of the patient's history were reviewed and updated as appropriate: allergies, current medications, past social history and problem list.    Outpatient Medications Marked as Taking for the 3/25/24 encounter (Office Visit) with Reji Gilman MD   Medication Sig Dispense Refill    Aspirin Low Dose 81 MG EC tablet TAKE ONE TABLET BY MOUTH DAILY 90 tablet 3    atorvastatin (LIPITOR) 40 MG tablet Take 1 tablet by mouth Every Night. 90 tablet 3    calcium carbonate-cholecalciferol 500-400 MG-UNIT tablet tablet Take 1 tablet by mouth Daily.      clopidogrel (PLAVIX) 75 MG tablet Take 1 tablet by mouth Daily. 90 tablet 3    gabapentin (NEURONTIN) 100 MG capsule Take 2 capsules by mouth Daily. Can increase by 100 mg every week. Max is 600mg      isosorbide mononitrate (IMDUR) 30 MG 24 hr tablet TAKE ONE TABLET BY MOUTH DAILY 90 tablet 3    metoprolol succinate XL (TOPROL-XL) 25 MG 24 hr tablet TAKE ONE TABLET BY MOUTH DAILY 90 tablet 3    nitroglycerin (NITROSTAT) 0.4 MG SL tablet Place 1 tablet under the tongue Every 5 (Five) Minutes As Needed for Chest Pain (Systolic BP Greater Than 100). Take no more than 3 doses in 15 minutes. 30 tablet 0    pantoprazole (PROTONIX) 40 MG EC tablet TAKE  ONE TABLET BY MOUTH DAILY 90 tablet 2    pramipexole (MIRAPEX) 1.5 MG tablet TAKE ONE TABLET BY MOUTH ONCE NIGHTLY 90 tablet 1    tamsulosin (FLOMAX) 0.4 MG capsule 24 hr capsule TAKE 1 CAPSULE BY MOUTH TWICE A  capsule 1       Review of Systems   Constitutional:  Positive for fatigue.   Respiratory:  Negative for shortness of breath.    Cardiovascular:  Negative for palpitations and leg swelling.   Gastrointestinal:  Positive for constipation. Negative for diarrhea.   Musculoskeletal:  Positive for back pain.   Psychiatric/Behavioral:  Nervous/anxious:  .        Objective   Vitals:    03/25/24 0953   BP: 110/62   Pulse: 63   Resp: 16   Temp: 98 °F (36.7 °C)   SpO2: 98%          03/25/24 0953   Weight: 77.1 kg (170 lb)    [unfilled]  Body mass index is 23.06 kg/m².      Physical Exam   Constitutional: He appears well-developed.   Neck: No thyromegaly present.   Cardiovascular: Normal rate, regular rhythm and normal heart sounds. Exam reveals no gallop.   No murmur heard.  Crescendo decrescendo murmur is present with a grade of 2/6.  Grade 1-2 WAGNER at the aortic area   Pulmonary/Chest: Effort normal and breath sounds normal. No respiratory distress. He has no wheezes. He has no rales.   Abdominal: Soft. Normal appearance and bowel sounds are normal. He exhibits no mass. There is no abdominal tenderness. There is no guarding.   Neurological: He is alert.         Problems Addressed this Visit          Cardiac and Vasculature    Aortic stenosis, mild - Primary (Chronic)    Coronary artery disease of native artery of native heart with stable angina pectoris       Endocrine and Metabolic    IGT (impaired glucose tolerance) (Chronic)       Other    Infrarenal abdominal aortic aneurysm (AAA) without rupture     Other Visit Diagnoses       Encounter for annual wellness exam in Medicare patient              Diagnoses         Codes Comments    Aortic stenosis, mild    -  Primary ICD-10-CM: I35.0  ICD-9-CM: 424.1      Coronary artery disease of native artery of native heart with stable angina pectoris     ICD-10-CM: I25.118  ICD-9-CM: 414.01, 413.9     IGT (impaired glucose tolerance)     ICD-10-CM: R73.02  ICD-9-CM: 790.22     Infrarenal abdominal aortic aneurysm (AAA) without rupture     ICD-10-CM: I71.43  ICD-9-CM: 441.4     Encounter for annual wellness exam in Medicare patient     ICD-10-CM: Z00.00  ICD-9-CM: V70.0           Assessment & Plan   In for 6-month recheck of IGT, restless leg syndrome, and osteoporosis.  His heartburn cleared up completely with his cardiac stents February 2024.  Retrospectively that was likely angina.  Previous history of PMR.   He started on prednisone on July 9, 2018.  Very mild aortic stenosis.  No further ache in the lower back, hips, knees, thighs.  We will check glucose, A1c today March 2024 every 6 months.  Annual lab work September 2023 including CBC, CMP, A1c.  Will recheck again in 6 months.  Annual wellness visit is due today March 2024.  Restless legs do great as long as he takes his Requip.  His big issues today are chronic fatigue as well as some loose skin in his neck that bothers him.  He remains on Imdur 30 mg daily along with metoprolol XL 25 mg daily for his heart and those are reviewed today.  If he plans to get that done he should check into appropriate immunizations to include coverage for spinal fluid leak that may include meningococcus.  The good news is it is not a splenectomy so he would be able to get the shots pre or postop.  Advised to get the RSV vaccine.  He is due for a CT of the abdominal aorta given the aneurysm that was seen on plain films 12/27/2023.  Will try to get that done.  Ultrasound confirmed a 5.9 cm aneurysm abdominal aorta and turns out this was seen in December 2023.  Will get a vascular surgery opinion on an endovascular repair.  He is 2.5 HPP today.    The above information was reviewed again today 03/25/24.  It continues to be accurate as  reflected above and is unchanged.  History, physical and review of systems all reviewed and are unchanged.  Medications were reviewed today and continue the current dosing.         Dragon disclaimer:   Much of this encounter note is an electronic transcription/translation of spoken language to printed text. The electronic translation of spoken language may permit erroneous, or at times, nonsensical words or phrases to be inadvertently transcribed; Although I have reviewed the note for such errors, some may still exist.                 Physical Exam  Constitutional:       Appearance: Normal appearance. He is well-developed.   Neck:      Thyroid: No thyromegaly.   Cardiovascular:      Rate and Rhythm: Normal rate and regular rhythm.      Heart sounds: Normal heart sounds. No murmur heard.     Crescendo decrescendo murmur is present with a grade of 2/6.      No gallop.      Comments: Grade 1-2 WAGNER at the aortic area  Pulmonary:      Effort: Pulmonary effort is normal. No respiratory distress.      Breath sounds: Normal breath sounds. No wheezing or rales.   Abdominal:      General: Bowel sounds are normal.      Palpations: Abdomen is soft. There is no mass.      Tenderness: There is no abdominal tenderness. There is no guarding.   Neurological:      Mental Status: He is alert.

## 2024-03-25 NOTE — PROGRESS NOTES
The ABCs of the Annual Wellness Visit  Subsequent Medicare Wellness Visit    Subjective      Jim Romero is a 89 y.o. male who presents for a Subsequent Medicare Wellness Visit.    The following portions of the patient's history were reviewed and   updated as appropriate: allergies, current medications, past family history, past medical history, past social history, past surgical history, and problem list.    Compared to one year ago, the patient feels his physical   health is the same.    Compared to one year ago, the patient feels his mental   health is the same.    Recent Hospitalizations:  He was admitted within the past 365 days at [facility].       Current Medical Providers:  Patient Care Team:  Reji Gilman MD as PCP - General (Internal Medicine)  Reji Gilman MD as PCP - Internal Medicine (Internal Medicine)    Outpatient Medications Prior to Visit   Medication Sig Dispense Refill    Aspirin Low Dose 81 MG EC tablet TAKE ONE TABLET BY MOUTH DAILY 90 tablet 3    atorvastatin (LIPITOR) 40 MG tablet Take 1 tablet by mouth Every Night. 90 tablet 3    calcium carbonate-cholecalciferol 500-400 MG-UNIT tablet tablet Take 1 tablet by mouth Daily.      clopidogrel (PLAVIX) 75 MG tablet Take 1 tablet by mouth Daily. 90 tablet 3    gabapentin (NEURONTIN) 100 MG capsule Take 2 capsules by mouth Daily. Can increase by 100 mg every week. Max is 600mg      isosorbide mononitrate (IMDUR) 30 MG 24 hr tablet TAKE ONE TABLET BY MOUTH DAILY 90 tablet 3    metoprolol succinate XL (TOPROL-XL) 25 MG 24 hr tablet TAKE ONE TABLET BY MOUTH DAILY 90 tablet 3    nitroglycerin (NITROSTAT) 0.4 MG SL tablet Place 1 tablet under the tongue Every 5 (Five) Minutes As Needed for Chest Pain (Systolic BP Greater Than 100). Take no more than 3 doses in 15 minutes. 30 tablet 0    pantoprazole (PROTONIX) 40 MG EC tablet TAKE ONE TABLET BY MOUTH DAILY 90 tablet 2    pramipexole (MIRAPEX) 1.5 MG tablet TAKE ONE TABLET BY MOUTH ONCE  "NIGHTLY 90 tablet 1    tamsulosin (FLOMAX) 0.4 MG capsule 24 hr capsule TAKE 1 CAPSULE BY MOUTH TWICE A  capsule 1     No facility-administered medications prior to visit.       No opioid medication identified on active medication list. I have reviewed chart for other potential  high risk medication/s and harmful drug interactions in the elderly.        Aspirin is on active medication list. Aspirin use is indicated based on review of current medical condition/s. Pros and cons of this therapy have been discussed today. Benefits of this medication outweigh potential harm.  Patient has been encouraged to continue taking this medication.  .      Patient Active Problem List   Diagnosis    RLS (restless legs syndrome)    BPH (benign prostatic hyperplasia)    GERD (gastroesophageal reflux disease)    Sciatica    Chronic fatigue    IGT (impaired glucose tolerance)    Chest pain    Aortic stenosis, mild    Abnormal nuclear stress test    PFO (patent foramen ovale)    Infrarenal abdominal aortic aneurysm (AAA) without rupture    Unstable angina    Coronary artery disease of native artery of native heart with stable angina pectoris    S/P drug eluting coronary stent placement     Advance Care Planning   Advance Care Planning     Advance Directive is not on file.  ACP discussion was held with the patient during this visit. Patient has an advance directive (not in EMR), copy requested.     Objective    Vitals:    03/25/24 0953   BP: 110/62   Pulse: 63   Resp: 16   Temp: 98 °F (36.7 °C)   TempSrc: Temporal   SpO2: 98%   Weight: 77.1 kg (170 lb)   Height: 182.9 cm (72\")     Estimated body mass index is 23.06 kg/m² as calculated from the following:    Height as of this encounter: 182.9 cm (72\").    Weight as of this encounter: 77.1 kg (170 lb).    BMI is within normal parameters. No other follow-up for BMI required.      Does the patient have evidence of cognitive impairment?   No            HEALTH RISK ASSESSMENT    Smoking " Status:  Social History     Tobacco Use   Smoking Status Former    Current packs/day: 0.00    Average packs/day: 1.5 packs/day for 8.0 years (12.0 ttl pk-yrs)    Types: Cigarettes    Start date:     Quit date:     Years since quittin.2    Passive exposure: Past   Smokeless Tobacco Never     Alcohol Consumption:  Social History     Substance and Sexual Activity   Alcohol Use Yes    Comment: 1-2 x month     Fall Risk Screen:    LICO Fall Risk Assessment was completed, and patient is at LOW risk for falls.Assessment completed on:3/25/2024    Depression Screening:      3/25/2024     9:50 AM   PHQ-2/PHQ-9 Depression Screening   Little Interest or Pleasure in Doing Things 0-->not at all   Feeling Down, Depressed or Hopeless 0-->not at all   PHQ-9: Brief Depression Severity Measure Score 0       Health Habits and Functional and Cognitive Screening:      3/25/2024     9:55 AM   Functional & Cognitive Status   Do you have difficulty preparing food and eating? No   Do you have difficulty bathing yourself, getting dressed or grooming yourself? No   Do you have difficulty using the toilet? No   Do you have difficulty moving around from place to place? No   Do you have trouble with steps or getting out of a bed or a chair? No   Current Diet Well Balanced Diet   Dental Exam Not up to date   Eye Exam Up to date   Exercise (times per week) 2 times per week   Current Exercises Include Walking   Do you need help using the phone?  No   Are you deaf or do you have serious difficulty hearing?  No   Do you need help to go to places out of walking distance? No   Do you need help shopping? No   Do you need help preparing meals?  No   Do you need help with housework?  No   Do you need help with laundry? No   Do you need help taking your medications? No   Do you need help managing money? No   Do you ever drive or ride in a car without wearing a seat belt? No       Age-appropriate Screening Schedule:  Refer to the list below  for future screening recommendations based on patient's age, sex and/or medical conditions. Orders for these recommended tests are listed in the plan section. The patient has been provided with a written plan.    Health Maintenance   Topic Date Due    RSV Vaccine - Adults (1 - 1-dose 60+ series) Never done    DXA SCAN  07/23/2020    COVID-19 Vaccine (4 - 2023-24 season) 09/01/2023    ANNUAL WELLNESS VISIT  12/06/2023    TDAP/TD VACCINES (2 - Td or Tdap) 05/17/2028    INFLUENZA VACCINE  Completed    Pneumococcal Vaccine 65+  Completed    ZOSTER VACCINE  Discontinued                  CMS Preventative Services Quick Reference  Risk Factors Identified During Encounter:    Fall Risk-High or Moderate: Information on Fall Prevention Shared in After Visit Summary    The above risks/problems have been discussed with the patient.  Pertinent information has been shared with the patient in the After Visit Summary.    Diagnoses and all orders for this visit:    1. Aortic stenosis, mild (Primary)    2. Coronary artery disease of native artery of native heart with stable angina pectoris    3. IGT (impaired glucose tolerance)    4. Infrarenal abdominal aortic aneurysm (AAA) without rupture    5. Encounter for annual wellness exam in Medicare patient        Follow Up:   Next Medicare Wellness visit to be scheduled in 1 year.      An After Visit Summary and PPPS were made available to the patient.

## 2024-03-26 ENCOUNTER — TELEPHONE (OUTPATIENT)
Age: 89
End: 2024-03-26
Payer: MEDICARE

## 2024-03-26 NOTE — TELEPHONE ENCOUNTER
The pt called wanting his results of his ultrasound. He was also given a note to call and talk to dr. Moya. But, he needs to be scheduled as a new patient. Call back number is [phone]

## 2024-03-26 NOTE — TELEPHONE ENCOUNTER
Pt called saying that he had a note to call dr. Moya for his results of his ultrasound ysday 3/25/24.

## 2024-03-26 NOTE — TELEPHONE ENCOUNTER
This is a new patient. Our office has been trying to contact him since December to make a new patient appt. Information given to  to set patient up for appointment and discussion of scans.

## 2024-04-08 ENCOUNTER — OFFICE VISIT (OUTPATIENT)
Age: 89
End: 2024-04-08
Payer: MEDICARE

## 2024-04-08 VITALS
HEIGHT: 72 IN | WEIGHT: 170 LBS | HEART RATE: 66 BPM | SYSTOLIC BLOOD PRESSURE: 151 MMHG | DIASTOLIC BLOOD PRESSURE: 79 MMHG | BODY MASS INDEX: 23.03 KG/M2

## 2024-04-08 DIAGNOSIS — I25.118 CORONARY ARTERY DISEASE OF NATIVE ARTERY OF NATIVE HEART WITH STABLE ANGINA PECTORIS: ICD-10-CM

## 2024-04-08 DIAGNOSIS — I71.43 INFRARENAL ABDOMINAL AORTIC ANEURYSM (AAA) WITHOUT RUPTURE: Primary | ICD-10-CM

## 2024-04-08 PROCEDURE — 1159F MED LIST DOCD IN RCRD: CPT | Performed by: SURGERY

## 2024-04-08 PROCEDURE — 1160F RVW MEDS BY RX/DR IN RCRD: CPT | Performed by: SURGERY

## 2024-04-08 PROCEDURE — G2211 COMPLEX E/M VISIT ADD ON: HCPCS | Performed by: SURGERY

## 2024-04-08 PROCEDURE — 99204 OFFICE O/P NEW MOD 45 MIN: CPT | Performed by: SURGERY

## 2024-04-08 NOTE — PROGRESS NOTES
"Chief Complaint  Aortic Aneurysm    Subjective          HPI: Jim Romero presents to [facility] for new patient evaluation regarding abdominal aortic aneurysm.  Patient denies any family history.  He did have some problems with \"heartburn\" but they actually improved after PCI x 2.    Review of Systems     History Review Reviewed Comments   Past Medical History:  [x]  GERD, restless leg, sciatica, coronary artery disease, BPH   Past Surgical History: [x]  Cardiac cath, cervical fusion, hernia repair   Family History: [x]  No aneurysms   Social History: [x]  Remote cigarette smoker     Objective   Vital Signs:  /79   Pulse 66   Ht 182.9 cm (72\")   Wt 77.1 kg (170 lb)   BMI 23.06 kg/m²   Estimated body mass index is 23.06 kg/m² as calculated from the following:    Height as of this encounter: 182.9 cm (72\").    Weight as of this encounter: 77.1 kg (170 lb).       Physical Exam  Vascular: Abdominal aorta is easily palpable.  Nontender.  Femoral pulses are normal.  I do not appreciate popliteal artery aneurysms.  No dependent rubor.  No tissue loss.    Result Review :    Common labs          1/31/2024    03:03 2/7/2024    03:38 3/25/2024    11:03   Common Labs   Glucose 88  102  70    BUN 20  23     Creatinine 1.03  0.97     Sodium 137  139     Potassium 4.5  4.1     Chloride 105  109     Calcium 8.6  8.6     WBC 8.21  10.78  7.79    Hemoglobin 11.3  9.9  10.7    Hematocrit 36.1  30.9  34.0    Platelets 190  186  209    Total Cholesterol   93    Triglycerides   35    HDL Cholesterol   42    LDL Cholesterol    41    Hemoglobin A1C   6.00      Most notable findings include: Hemoglobin 10.7.  Creatinine 0.97.  Hemoglobin A1c 6.0        Jim Romero  reports that he quit smoking about 67 years ago. His smoking use included cigarettes. He started smoking about 75 years ago. He has a 12 pack-year smoking history. He has been exposed to tobacco smoke. He has never used smokeless " tobacco..           Assessment and Plan     Assessment & Plan  Infrarenal abdominal aortic aneurysm (AAA) without rupture  89-year-old gentleman with extensive cardiac history who was originally referred to our office for an abdominal aortic aneurysm back in November 2023.  We have diligently try to get a hold of him but his course has been complicated by the need for staged PCI for coronary artery disease.  This was originally seen on a plain x-ray and then confirmed with an aortic ultrasound on 3/25/2024.  At this time, his aneurysm measured almost 6 cm in size.  However, I am not sure that this was necessary as there is also a CT scan in the chart from [place] that is dated 11/30/2023.  This study shows a fusiform aneurysm of the infrarenal aorta measuring 5.9 cm in diameter.  Left common iliac artery measures 2.4 cm with the right measuring 1.7.     The patient denies a family history of aneurysm and was unaware of this diagnosis until he was recently seen treated.  It looks like the abdomen pelvis scan may have been ordered due to weight loss.  He has a history of polymyalgia rheumatica.  On exam, he is hard of hearing.  Abdominal aorta is palpable and nontender.  Popliteal pulses are normal.  He has some mild edema particularly on the left but without rubor or ulceration.    Had a long conversation with the patient.  He is 89 years old and his risk of rupture at this size is probably around 10% annually.  He is not sure if he would consider any sort of elective repair but ultimately after long conversation we decided he'dconsider ordering a CT angiogram but wants to think about it before scheduling.  I also sent a message to Dr. Gilman to discuss his overall care.  Coronary artery disease of native artery of native heart with stable angina pectoris          From last cardiology note:  Staged PCI 02/06/2024:  1. Successful PCI of the mid LAD with a 3.25 x 38 mm Xience yony point drug-eluting stent, postdilated to  high-pressure with a 3.5 x 20 mm noncompliant trek balloon      Left heart cath 1/30/2024:  1. Left main: Calcified 30 to 40% distal stenosis.  2. LAD: Severely calcified chronic total occlusion mid segment fills via bridging collaterals and left to left collaterals.  3. LCX: Diffuse 70% calcified OM1 stenosis in proximal segment  4. RCA: Ostial 80% stenosis with moderate calcification and severely calcified 80 to 90% proximal to mid vessel stenosis  5.  Successful PCI of the ostial to mid RCA with overlapping 3.5 x 23 mm and 3.5 x 38 mm Xience yony point drug-eluting stents.  Postdilated to high-pressure with a 4 mm NC trek balloon.     Follow-up in 1 month scheduled but would be happy to see or talk to him sooner to help him make decisions about moving forward with CT angiogram for surgical evaluation or not.     Follow Up     Return in about 1 month (around 5/8/2024).  Patient was given instructions and counseling regarding his condition or for health maintenance advice. Please see specific information pulled into the AVS if appropriate.

## 2024-04-08 NOTE — ASSESSMENT & PLAN NOTE
89-year-old gentleman with extensive cardiac history who was originally referred to our office for an abdominal aortic aneurysm back in November 2023.  We have diligently try to get a hold of him but his course has been complicated by the need for staged PCI for coronary artery disease.  This was originally seen on a plain x-ray and then confirmed with an aortic ultrasound on 3/25/2024.  At this time, his aneurysm measured almost 6 cm in size.  However, I am not sure that this was necessary as there is also a CT scan in the chart from [place] that is dated 11/30/2023.  This study shows a fusiform aneurysm of the infrarenal aorta measuring 5.9 cm in diameter.  Left common iliac artery measures 2.4 cm with the right measuring 1.7.     The patient denies a family history of aneurysm and was unaware of this diagnosis until he was recently seen treated.  It looks like the abdomen pelvis scan may have been ordered due to weight loss.  He has a history of polymyalgia rheumatica.  On exam, he is hard of hearing.  Abdominal aorta is palpable and nontender.  Popliteal pulses are normal.  He has some mild edema particularly on the left but without rubor or ulceration.    Had a long conversation with the patient.  He is 89 years old and his risk of rupture at this size is probably around 10% annually.  He is not sure if he would consider any sort of elective repair but ultimately after long conversation we decided he'dconsider ordering a CT angiogram but wants to think about it before scheduling.  I also sent a message to Dr. Gilman to discuss his overall care.

## 2024-04-09 ENCOUNTER — TELEPHONE (OUTPATIENT)
Age: 89
End: 2024-04-09
Payer: MEDICARE

## 2024-04-09 NOTE — TELEPHONE ENCOUNTER
Pt was seen ysday w dr. Moya concerning his aaa. He is stating that he is having more pain and discomfort. The note states that dr. Moya suggested a ct angiogram and he still doesn't seem like he is wanting that done. What other alternatives do we have moving forward with that?

## 2024-04-11 ENCOUNTER — OFFICE VISIT (OUTPATIENT)
Age: 89
End: 2024-04-11
Payer: MEDICARE

## 2024-04-11 VITALS
SYSTOLIC BLOOD PRESSURE: 108 MMHG | BODY MASS INDEX: 23.03 KG/M2 | DIASTOLIC BLOOD PRESSURE: 62 MMHG | HEART RATE: 62 BPM | WEIGHT: 170 LBS | HEIGHT: 72 IN

## 2024-04-11 DIAGNOSIS — I35.0 AORTIC STENOSIS, MILD: ICD-10-CM

## 2024-04-11 DIAGNOSIS — Z95.5 S/P DRUG ELUTING CORONARY STENT PLACEMENT: ICD-10-CM

## 2024-04-11 DIAGNOSIS — I25.118 CORONARY ARTERY DISEASE OF NATIVE ARTERY OF NATIVE HEART WITH STABLE ANGINA PECTORIS: Primary | ICD-10-CM

## 2024-04-11 PROCEDURE — 99214 OFFICE O/P EST MOD 30 MIN: CPT | Performed by: INTERNAL MEDICINE

## 2024-04-11 PROCEDURE — 93000 ELECTROCARDIOGRAM COMPLETE: CPT | Performed by: INTERNAL MEDICINE

## 2024-04-11 NOTE — PROGRESS NOTES
Jim Romero  3/17/1931  Date of Office Visit: 04/11/24  Encounter Provider: Froylan Booker MD  Place of Service: [facility] CARDIOLOGY      CHIEF COMPLAINT:  Chest pain  Abnormal stress test  Coronary artery disease: Status post PCI of the RCA and LAD  AAA: Follows with vascular surgery.    HISTORY OF PRESENT ILLNESS:  89-year-old male patient who presented to the emergency room on 11/16/2022 with report of lower chest and epigastric discomfort.  Reportedly this was when he was walking and then raking leaves.  It was relieved by rest.  He denied rest pain.  His cardiac biomarkers were negative.  His electrocardiogram in the ER was reviewed and showed a sinus rhythm with just nonspecific ST-T wave abnormalities.  His transthoracic echocardiogram showed normal left ventricular size and systolic function and no wall motion abnormalities.  There was mild aortic valve stenosis and mild to moderate tricuspid valve regurgitation along with mild mitral valve regurgitation.  There was a very small pericardial effusion also documented.  He underwent perfusion stress test which is listed below.  I  reviewed the images and I do not agree with the read.  There looks to be a fixed area of decreased perfusion in the inferior wall and septum towards that is distal and apical.     We attempted medical management with aspirin, metoprolol and Imdur, however he had a headache with Imdur.  He stated that he had had more chest pain as of late that is burning, central and nonradiating.  He subsequently was taken for cardiac catheterization and noted to have an ostial 80% RCA stenosis and severely calcified 80 to 90% mid vessel stenosis.  He underwent PCI of that vessel with a 3.5 x 23 mm and 3.5 x 38 mm Xience SkyPoint drug-eluting stent, postdilated to high-pressure with a 4 mm NC trek balloon.  He was then brought back for staged intervention to the LAD and underwent that with a 3.25 x 38 mm  Xience yony point drug-eluting stent, postdilated to high-pressure with a 3.5 mm NC trek balloon in the proximal to mid stent segment.  He tolerated this well    He has since followed up for his large AAA with Dr. Car Moya and there were discussions regarding possible endovascular repair.  He is thinking about this.  He states he has been doing well as of late and denies any issues with chest pain.  He intermittently will have mild abdominal pain that he says is a 2-3 out of 10.    Review of Systems   Constitutional: Negative for fever and malaise/fatigue.   HENT:  Negative for nosebleeds and sore throat.    Eyes:  Negative for blurred vision and double vision.   Cardiovascular:  Negative for chest pain, claudication, palpitations and syncope.   Respiratory:  Negative for cough, shortness of breath and snoring.    Endocrine: Negative for cold intolerance, heat intolerance and polydipsia.   Skin:  Negative for itching, poor wound healing and rash.   Musculoskeletal:  Negative for joint pain, joint swelling, muscle weakness and myalgias.   Gastrointestinal:  Negative for abdominal pain, melena, nausea and vomiting.   Neurological:  Negative for light-headedness, loss of balance, seizures, vertigo and weakness.   Psychiatric/Behavioral:  Negative for altered mental status and depression.        Past Medical History:   Diagnosis Date    Benign prostatic hyperplasia     GERD (gastroesophageal reflux disease)     Medication management     RLS (restless legs syndrome)     Sciatica        The following portions of the patient's history were reviewed and updated as appropriate: Social history , Family history and Surgical history     Current Outpatient Medications on File Prior to Visit   Medication Sig Dispense Refill    Aspirin Low Dose 81 MG EC tablet TAKE ONE TABLET BY MOUTH DAILY 90 tablet 3    atorvastatin (LIPITOR) 40 MG tablet Take 1 tablet by mouth Every Night. 90 tablet 3    calcium carbonate-cholecalciferol  "500-400 MG-UNIT tablet tablet Take 1 tablet by mouth Daily.      clopidogrel (PLAVIX) 75 MG tablet Take 1 tablet by mouth Daily. 90 tablet 3    gabapentin (NEURONTIN) 100 MG capsule Take 2 capsules by mouth Daily. Can increase by 100 mg every week. Max is 600mg      isosorbide mononitrate (IMDUR) 30 MG 24 hr tablet TAKE ONE TABLET BY MOUTH DAILY 90 tablet 3    metoprolol succinate XL (TOPROL-XL) 25 MG 24 hr tablet TAKE ONE TABLET BY MOUTH DAILY 90 tablet 3    nitroglycerin (NITROSTAT) 0.4 MG SL tablet Place 1 tablet under the tongue Every 5 (Five) Minutes As Needed for Chest Pain (Systolic BP Greater Than 100). Take no more than 3 doses in 15 minutes. 30 tablet 0    pantoprazole (PROTONIX) 40 MG EC tablet TAKE ONE TABLET BY MOUTH DAILY 90 tablet 2    pramipexole (MIRAPEX) 1.5 MG tablet TAKE ONE TABLET BY MOUTH ONCE NIGHTLY 90 tablet 1    tamsulosin (FLOMAX) 0.4 MG capsule 24 hr capsule TAKE 1 CAPSULE BY MOUTH TWICE A  capsule 1     No current facility-administered medications on file prior to visit.       No Known Allergies    Vitals:    04/11/24 1123   BP: 108/62   Pulse: 62   Weight: 77.1 kg (170 lb)   Height: 182.9 cm (72\")       Body mass index is 23.06 kg/m².   Constitutional:       Appearance: Well-developed.   Eyes:      General: No scleral icterus.     Conjunctiva/sclera: Conjunctivae normal.   HENT:      Head: Normocephalic and atraumatic.   Neck:      Thyroid: No thyromegaly.      Vascular: Normal carotid pulses. No carotid bruit, hepatojugular reflux or JVD.      Trachea: No tracheal deviation.   Pulmonary:      Effort: No respiratory distress.      Breath sounds: Normal breath sounds. No decreased breath sounds. No wheezing. No rhonchi. No rales.   Chest:      Chest wall: Not tender to palpatation.   Cardiovascular:      Normal rate. Regular rhythm.      No gallop.    Pulses:     Carotid: 2+ bilaterally.     Radial: 2+ bilaterally.     Femoral: 2+ bilaterally.     Dorsalis pedis: 2+ bilaterally.   "   Posterior tibial: 2+ bilaterally.  Edema:     Peripheral edema absent.   Abdominal:      General: Bowel sounds are normal. There is no distension.      Palpations: Abdomen is soft.      Tenderness: There is no abdominal tenderness.   Musculoskeletal:         General: No deformity.      Cervical back: Normal range of motion and neck supple. Skin:     Findings: No erythema or rash.   Neurological:      Mental Status: Alert and oriented to person, place, and time.      Sensory: No sensory deficit.   Psychiatric:         Behavior: Behavior normal.            Lab Results   Component Value Date    WBC 7.79 03/25/2024    HGB 10.7 (L) 03/25/2024    HCT 34.0 (L) 03/25/2024    MCV 81.1 03/25/2024     03/25/2024       Lab Results   Component Value Date    GLUCOSE 70 03/25/2024    BUN 23 02/07/2024    CREATININE 0.97 02/07/2024    EGFRIFNONA 57 (L) 09/13/2021    EGFRIFAFRI 69 09/13/2021    BCR 23.7 02/07/2024    K 4.1 02/07/2024    CO2 24.0 02/07/2024    CALCIUM 8.6 02/07/2024    PROTENTOTREF 6.2 10/20/2022    ALBUMIN 3.8 11/22/2023    LABIL2 1.8 10/20/2022    AST 15 11/22/2023    ALT 12 11/22/2023       Lab Results   Component Value Date    GLUCOSE 70 03/25/2024    CALCIUM 8.6 02/07/2024     02/07/2024    K 4.1 02/07/2024    CO2 24.0 02/07/2024     (H) 02/07/2024    BUN 23 02/07/2024    CREATININE 0.97 02/07/2024    EGFRIFAFRI 69 09/13/2021    EGFRIFNONA 57 (L) 09/13/2021    BCR 23.7 02/07/2024    ANIONGAP 6.0 02/07/2024       Lab Results   Component Value Date    CHOL 93 03/25/2024    CHLPL 165 10/20/2022    TRIG 35 03/25/2024    HDL 42 03/25/2024    LDL 41 03/25/2024         ECG 12 Lead    Date/Time: 4/11/2024 11:57 AM  Performed by: Froylan Booker MD    Authorized by: Froylan Booker MD  Comparison: compared with previous ECG from 2/14/2024  Similar to previous ECG  Rhythm: sinus rhythm  Rate: normal  QRS axis: normal           2/6/24  Conclusions:   1.  Successful PCI of the mid LAD with  a 3.25 x 38 mm Xience yony point drug-eluting stent, postdilated to high-pressure with a 3.5 x 20 mm noncompliant trek balloon    1/30/24  Conclusions:   1. Left main: Calcified 30 to 40% distal stenosis.  2. LAD: Severely calcified chronic total occlusion mid segment fills via bridging collaterals and left to left collaterals.  3. LCX: Diffuse 70% calcified OM1 stenosis in proximal segment  4. RCA: Ostial 80% stenosis with moderate calcification and severely calcified 80 to 90% proximal to mid vessel stenosis  5.  Successful PCI of the ostial to mid RCA with overlapping 3.5 x 23 mm and 3.5 x 38 mm Xience yony point drug-eluting stents.  Postdilated to high-pressure with a 4 mm NC trek balloon.    Results for orders placed during the hospital encounter of 11/15/22    Adult Transthoracic Echo Complete W/ Cont if Necessary Per Protocol    Interpretation Summary    Left ventricular systolic function is normal. Left ventricular ejection fraction appears to be 61 - 65%.    Left ventricular wall thickness is consistent with moderate concentric hypertrophy.    Left ventricular diastolic function is consistent with (grade I) impaired relaxation.    The right ventricular cavity is mildly dilated. Normal right ventricular systolic function noted.    The left atrial cavity is moderately dilated.    The interatrial septum appears redundant. The agitated saline study is positive with Valsalva, consistent with a small to moderate PFO    Mild to moderate aortic valve stenosis is present. Aortic valve maximum pressure gradient is 25 mmHg. Aortic valve mean pressure gradient is 15 mmHg.    Mild mitral valve regurgitation is present.    Mild to moderate tricuspid valve regurgitation is present    Calculated right ventricular systolic pressure from tricuspid regurgitation is 39 mmHg.    There is a small (<1cm) circumferential pericardial effusion. There is no evidence of cardiac tamponade.    Stress    ST segment depression of 0.5 mm in  the inferolateral leads was noted during stress (II, III, aVF, V5 and V6), beginning at 3 minutes of stress.    Myocardial perfusion imaging indicates a medium-sized, moderately severe area of ischemia located in the inferior wall and septal wall.    Left ventricular ejection fraction is normal (Calculated EF = 65%).    Impressions are consistent with a high risk study.      DISCUSSION/SUMMARY  89-year-old male with a medical history of chest pain, mild aortic valve stenosis, AAA and coronary artery disease with recent PCI to the LAD and RCA presents back to me in follow-up.  He has been doing well since that time from cardiac standpoint.  He denies any chest pain.  He does have mild abdominal pain intermittently and history of a large AAA that is being evaluated by vascular surgery.    1.  Coronary artery disease: Status post PCI of the RCA and staged intervention to the mid LAD.  He tolerated this well.  - Continue dual antiplatelet therapy lifelong.  Aspirin and Plavix.  - Continue atorvastatin 40 mg p.o. nightly.  Tolerating well.  No significant issues with myalgias.  - Continue Toprol 25 mg p.o. daily.    2.  Mild aortic valve stenosis: Asymptomatic.  We will continue to follow.    3.  AAA: Continue to follow with vascular surgery.

## 2024-04-16 ENCOUNTER — TELEPHONE (OUTPATIENT)
Age: 89
End: 2024-04-16
Payer: MEDICARE

## 2024-04-16 DIAGNOSIS — Z95.828 HISTORY OF ENDOVASCULAR STENT GRAFT FOR ABDOMINAL AORTIC ANEURYSM: Primary | ICD-10-CM

## 2024-04-16 NOTE — TELEPHONE ENCOUNTER
Patient called regarding possible EVAR repair. On last office visit pt was given the option to decide if he wanted to pursue this. The first step would be to perform a CTA, this was ordered per MD and  will be contacting him.

## 2024-04-19 ENCOUNTER — HOSPITAL ENCOUNTER (OUTPATIENT)
Dept: CT IMAGING | Facility: HOSPITAL | Age: 89
Discharge: HOME | End: 2024-04-19
Payer: MEDICARE

## 2024-04-19 DIAGNOSIS — Z95.828 HISTORY OF ENDOVASCULAR STENT GRAFT FOR ABDOMINAL AORTIC ANEURYSM: ICD-10-CM

## 2024-04-19 LAB — CREAT BLDA-MCNC: 1.4 MG/DL (ref 0.6–1.3)

## 2024-04-19 PROCEDURE — 82565 ASSAY OF CREATININE: CPT

## 2024-04-19 PROCEDURE — 25510000001 IOPAMIDOL PER 1 ML: Performed by: SURGERY

## 2024-04-19 PROCEDURE — 74174 CTA ABD&PLVS W/CONTRAST: CPT

## 2024-04-19 RX ADMIN — IOPAMIDOL 95 ML: 755 INJECTION, SOLUTION INTRAVENOUS at 14:52

## 2024-05-13 ENCOUNTER — OFFICE VISIT (OUTPATIENT)
Age: 89
End: 2024-05-13
Payer: MEDICARE

## 2024-05-13 VITALS
WEIGHT: 157 LBS | HEIGHT: 72 IN | DIASTOLIC BLOOD PRESSURE: 65 MMHG | BODY MASS INDEX: 21.26 KG/M2 | HEART RATE: 66 BPM | SYSTOLIC BLOOD PRESSURE: 127 MMHG

## 2024-05-13 DIAGNOSIS — I71.43 INFRARENAL ABDOMINAL AORTIC ANEURYSM (AAA) WITHOUT RUPTURE: Primary | ICD-10-CM

## 2024-05-13 PROCEDURE — 1160F RVW MEDS BY RX/DR IN RCRD: CPT | Performed by: SURGERY

## 2024-05-13 PROCEDURE — G2211 COMPLEX E/M VISIT ADD ON: HCPCS | Performed by: SURGERY

## 2024-05-13 PROCEDURE — 1159F MED LIST DOCD IN RCRD: CPT | Performed by: SURGERY

## 2024-05-13 PROCEDURE — 99214 OFFICE O/P EST MOD 30 MIN: CPT | Performed by: SURGERY

## 2024-05-13 NOTE — PROGRESS NOTES
"Chief Complaint  No chief complaint on file.    Subjective          HPI: Jim Romero presents to [facility] VASCULAR SURGERY with an abdominal aortic aneurysm who presents today to review the results of his CT angiogram.    Review of Systems     History Review Reviewed Comments   Past Medical History:  [x]  GERD, restless leg, sciatica, coronary artery disease, BPH   Past Surgical History: [x]  Cardiac cath, cervical fusion, hernia repair   Family History: [x]  No aneurysms   Social History: [x]  Remote cigarette smoker     Objective   Vital Signs:  There were no vitals taken for this visit.  Estimated body mass index is 23.06 kg/m² as calculated from the following:    Height as of 4/11/24: 182.9 cm (72\").    Weight as of 4/11/24: 77.1 kg (170 lb).       Physical Exam  Vascular: Abdominal aorta is easily palpable.  Nontender.  Femoral pulses are normal.  I do not appreciate popliteal artery aneurysms.  No dependent rubor.  No tissue loss.    Result Review :    Common labs          2/7/2024    03:38 3/25/2024    11:03 4/19/2024    14:37   Common Labs   Glucose 102  70     BUN 23      Creatinine 0.97   1.40    Sodium 139      Potassium 4.1      Chloride 109      Calcium 8.6      WBC 10.78  7.79     Hemoglobin 9.9  10.7     Hematocrit 30.9  34.0     Platelets 186  209     Total Cholesterol  93     Triglycerides  35     HDL Cholesterol  42     LDL Cholesterol   41     Hemoglobin A1C  6.00       Most notable findings include: Hemoglobin 9.9 creatinine 0.97.  Hemoglobin A1c 6.0        Jim Romero  reports that he quit smoking about 67 years ago. His smoking use included cigarettes. He started smoking about 75 years ago. He has a 12 pack-year smoking history. He has been exposed to tobacco smoke. He has never used smokeless tobacco..           Assessment and Plan     Assessment & Plan  Infrarenal abdominal aortic aneurysm (AAA) without rupture           89-year-old gentleman here today for follow-up " regarding a 6 cm aneurysm and to discuss the results of his CT angiogram he has an angulated neck but I do think I think we could obtain a proximal seal.  He has 2 right renal arteries that are diseased.  He has severe tortuosity of bilateral iliacs but more prominent on the left than the right.  He also has a very high origin of the profunda on the left.  Overall, this is a difficult situation because of the patient's age.  He does not have ideal anatomy for endovascular repair but potentially does have enough of the neck for a seal he also has a left iliac artery aneurysm with some tortuosity that we would have to deal with.  Overall it is hard to help him stratify the risk but I did tell them that a nonoperative management would have a less than 10% annual risk of rupture.  Certainly if he had a complication there is reasonable chance that he would poorly tolerate it given his age and comorbidities.  He is going to think about this and talk about it with family.  He is going to see me back in 2 weeks to discuss how he wants to proceed.  Risk of rupture discussed with the patient.    Heart disease: Patient saw Dr. Edison Booker on 4/11/2024.  He underwent a relatively recent PCI of the RCA x 2 and then had a staged intervention to the LAD.  Lifelong antiplatelet therapy has been recommended.       Follow Up     No follow-ups on file.  Patient was given instructions and counseling regarding his condition or for health maintenance advice. Please see specific information pulled into the AVS if appropriate.

## 2024-05-30 RX ORDER — PRAMIPEXOLE DIHYDROCHLORIDE 1.5 MG/1
TABLET ORAL
Qty: 90 TABLET | Refills: 1 | Status: SHIPPED | OUTPATIENT
Start: 2024-05-30

## 2024-06-14 RX ORDER — TAMSULOSIN HYDROCHLORIDE 0.4 MG/1
1 CAPSULE ORAL 2 TIMES DAILY
Qty: 180 CAPSULE | Refills: 1 | Status: SHIPPED | OUTPATIENT
Start: 2024-06-14

## 2024-08-07 ENCOUNTER — TELEPHONE (OUTPATIENT)
Age: 89
End: 2024-08-07
Payer: MEDICARE

## 2024-08-07 NOTE — TELEPHONE ENCOUNTER
I attempted to call the patient on the phone today.  He appeared to  but could not connect with me.  He never spoke.  The last time he saw me in the office he was trying to decide if he wanted to move forward with the evaluation for repair given his age of 89 and the complexity of the case.    I think from a technical standpoint I think this would be a doable case although challenging.  Would lose the inferior left renal artery accessory.  Ultimately, I think I would offer him repair as an option but might advise him against it as I think it seems unlikely to extend his life significantly.  Ultimately, I would like to have a conversation about this so that we both feel comfortable and that all of his questions have been answered.  I reached out to Dr. ARNOLD today as well who has not spoken with him about this either but does have routine scheduled follow-up.    I will send contact information to my front office staff to try to get in touch with the patient and see if he wants to discuss via phone or come in for an office conversation happy to also discuss with his family.

## 2024-08-09 ENCOUNTER — TELEPHONE (OUTPATIENT)
Dept: INTERNAL MEDICINE | Facility: CLINIC | Age: 89
End: 2024-08-09
Payer: MEDICARE

## 2024-08-09 DIAGNOSIS — I71.43 INFRARENAL ABDOMINAL AORTIC ANEURYSM (AAA) WITHOUT RUPTURE: Primary | ICD-10-CM

## 2024-08-09 NOTE — TELEPHONE ENCOUNTER
PATIENT CALLED TO LET  KNOW THAT HE WAS NOT ABLE TO GET IN TOUCH WITH DR. GIGI GAUTAM' OFFICE.     HE HAD THE CORRECT NUMBER    CALL BACK NUMBER [phone]

## 2024-08-09 NOTE — TELEPHONE ENCOUNTER
Sanchez Moya MD  [address] [place] [address]  [phone]    Pt has not been able to get through to office to schedule an evaluation. Needs to schedule Anyday , but MONDAY, but anytime.  Will call their office on Monday to schedule for him.

## 2024-08-12 ENCOUNTER — TRANSCRIBE ORDERS (OUTPATIENT)
Dept: ADMINISTRATIVE | Facility: HOSPITAL | Age: 89
End: 2024-08-12
Payer: MEDICARE

## 2024-08-12 NOTE — TELEPHONE ENCOUNTER
Called Vascular office, phone continues to ring. Spoke with [facility] Scheduling, transferred me to Hub for Dr. Moya office to schedule  Redirected to alternative [phone] Vascular Dept

## 2024-08-12 NOTE — TELEPHONE ENCOUNTER
Informed patient LM with  [facility] referral coordinator to schedule.   Patient stated has been scheduled, nothing further

## 2024-08-15 PROBLEM — I72.3 ANEURYSM OF ILIAC ARTERY: Status: ACTIVE | Noted: 2024-08-15

## 2024-08-15 NOTE — PROGRESS NOTES
"Chief Complaint  Aortic Aneurysm    Subjective      HPI: Jim Romero is a 89 y.o. male seen for evaluation of abdominal aortic aneurysm.  Has recently seen my partner Dr. Moya 4/8/2024, but I am seeing him today for the first time.  Plain radiographs of the spine suggested AAA, later confirmed with abdominal sonogram.  CT angiogram requested.  On presentation today the patient described being fatigued, and having very little energy.  He attributed this to the aneurysm, and is thinking that his quality of life is deteriorating to the point that he would like to proceed with aneurysm repair.  He is having no abdominal or back problems.  He describes having sinking spells maybe every month, in which she becomes weak, short of breath and is unable to walk.  As it turns out, he had an episode of this nature here in the office.    Objective   Vital Signs:  BP 90/47   Pulse 62   Ht 182.9 cm (72\")   Wt 74.8 kg (165 lb)   BMI 22.38 kg/m²   Estimated body mass index is 22.38 kg/m² as calculated from the following:    Height as of this encounter: 182.9 cm (72\").    Weight as of this encounter: 74.8 kg (165 lb).   BMI is within normal parameters. No other follow-up for BMI required.   Jim Romero  reports that he quit smoking about 67 years ago. His smoking use included cigarettes. He started smoking about 75 years ago. He has a 12 pack-year smoking history. He has been exposed to tobacco smoke. He has never used smokeless tobacco..     Exam: Elderly gentleman, alert, oriented.  Abdomen soft and nontender.    Femoral artery pulses palpated.    Personal review of data: Abdominal aortic sonogram 3/5/2024 demonstrating 5.9 centimeter AAA with right and left common iliac artery aneurysms measuring 1.8 x 2.5 cm.  Laboratory studies including POC creatinine normal 0.97 February, 2024, but newly elevated 1.4 on 4/19/2024.  CT angiogram of the abdomen and pelvis 4/19/2024 shows intact 5.9 cm infrarenal AAA with bilateral " common iliac artery aneurysms measuring 1.5 and 2.3 cm.  There are 2 small-adilene right renal arteries with a similar origin, with atherosclerosis and proximal stenoses and at least 1 if not both of them.  In contrast to the dictated report, there are 2 codominant left renal arteries, both widely patent, with the inferior of the 2 taking origin from the aneurysm sac.  The aorta has a favorable size at the level of the 2 right and superior left renal arteries, but has a fairly abrupt reverse taper from 20 to 31 mm at 15 mm inferiorly.  There is no mural thrombus.  The SUSAN is patent and there are patent lumbar arteries.  There is iliac artery tortuosity.  Access should not be a problem.  Report of cardiac cath 2/6/2024 with successful PCI mid LAD  Report of cardiac cath 1/29/2024 with PCI to the RCA.  Report of echo 11/16/2022 with normal LVEF, grade 1 diastolic dysfunction, mild to moderate aortic valve stenosis with maximum and mean pressure gradients of 25 and 15 mmHg and aortic valve area 1.16 cm².  Mild pulmonary hypertension 39 mmHg.  Small pericardial effusion.  Initial evaluation note by my partner Dr. Moya 4/8/2024.     Assessment and Plan     Diagnoses and all orders for this visit:    1. Infrarenal abdominal aortic aneurysm (AAA) without rupture (Primary)    2. Aneurysm of iliac artery    Summary: 89-year-old gentleman referred for evaluation of large intact AAA.  Laboratory studies demonstrate new renal insufficiency that developed between February and April 2024, not recently reassessed.  Review of the CT angiogram demonstrates 2 right and 2 left renal arteries, with mild to moderate stenoses on the right.  The left inferior renal artery takes its origin from the aneurysm sac, and appears to provide codominant contribution to the left kidney.  Moreover there is marked reversed taper of the proximal neck.  There is significant iliac artery tortuosity, but I think that is not really as much an issue as I  think the short neck and reversed taper represent.  He is not a favorable candidate for endovascular repair with a [facility] system because of his short neck or for a fenestrated aortic graft because of small right renal artery size.  He might be a candidate for a Zenith endograft, but even so we would need to cover the left inferior renal artery within the endovascular graft, which in the context of stenoses of the 2 right renal arteries and worsening renal insufficiency would represent moderate risk for SUNNI requiring hemodialysis, possibly permanent.  Open AAA repair with a tube graft is another option, though the patient is on dual antiplatelet therapy because of recent PCI, which cannot be interrupted safely.  As if this is not enough, the patient was somewhat hypotensive on presentation, with systolic blood pressure of 90.  While he was here he had an episode in which he experienced mild shortness of breath without external evidence of dyspnea or tachypnea, and without diaphoresis or chest pain.  He slumped himself over in the chair, and ultimately we had to get a wheelchair to get him out of the office.  I recommended being evaluated in the emergency room, but the patient's son is well aware of these episodes and explains that his father always declines going to the hospital during these spells.  Such is the case today.  Having undefined episodes like this further reinforces my recommendation to manage his aneurysm none interventionally.  In order to be helpful, I also discussed about making more long-term plans about what to do if the aneurysm ruptures.  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  I am respectful of the patient, his son and Dr. Moya's opinion, and if they would like to  return to discuss this matter further with Dr. Moya I certainly have no ego about that decision.  For now they will follow-up again on request.  I am hoping they had to the ER.    Follow Up     No follow-ups on file.  Patient was given instructions and counseling regarding his condition or for health maintenance advice. Please see specific information pulled into the AVS if appropriate.

## 2024-08-16 ENCOUNTER — OFFICE VISIT (OUTPATIENT)
Age: 89
End: 2024-08-16
Payer: MEDICARE

## 2024-08-16 VITALS
BODY MASS INDEX: 22.35 KG/M2 | SYSTOLIC BLOOD PRESSURE: 90 MMHG | WEIGHT: 165 LBS | HEIGHT: 72 IN | DIASTOLIC BLOOD PRESSURE: 47 MMHG | HEART RATE: 62 BPM

## 2024-08-16 DIAGNOSIS — I72.3 ANEURYSM OF ILIAC ARTERY: ICD-10-CM

## 2024-08-16 DIAGNOSIS — I71.43 INFRARENAL ABDOMINAL AORTIC ANEURYSM (AAA) WITHOUT RUPTURE: Primary | ICD-10-CM

## 2024-09-12 RX ORDER — ASPIRIN 81 MG/1
81 TABLET, COATED ORAL DAILY
Qty: 90 TABLET | Refills: 3 | Status: SHIPPED | OUTPATIENT
Start: 2024-09-12

## 2024-09-20 RX ORDER — AMOXICILLIN 500 MG/1
500 CAPSULE ORAL 3 TIMES DAILY
Qty: 21 CAPSULE | Refills: 0 | OUTPATIENT
Start: 2024-09-20

## 2024-09-25 ENCOUNTER — OFFICE VISIT (OUTPATIENT)
Dept: INTERNAL MEDICINE | Facility: CLINIC | Age: 89
End: 2024-09-25
Payer: MEDICARE

## 2024-09-25 ENCOUNTER — LAB (OUTPATIENT)
Dept: LAB | Facility: HOSPITAL | Age: 89
End: 2024-09-25
Payer: MEDICARE

## 2024-09-25 VITALS
HEIGHT: 72 IN | TEMPERATURE: 98.2 F | HEART RATE: 62 BPM | BODY MASS INDEX: 23.16 KG/M2 | RESPIRATION RATE: 16 BRPM | SYSTOLIC BLOOD PRESSURE: 100 MMHG | DIASTOLIC BLOOD PRESSURE: 62 MMHG | OXYGEN SATURATION: 98 % | WEIGHT: 171 LBS

## 2024-09-25 DIAGNOSIS — I71.43 INFRARENAL ABDOMINAL AORTIC ANEURYSM (AAA) WITHOUT RUPTURE: ICD-10-CM

## 2024-09-25 DIAGNOSIS — R73.02 IGT (IMPAIRED GLUCOSE TOLERANCE): Chronic | ICD-10-CM

## 2024-09-25 DIAGNOSIS — Z23 NEED FOR VACCINATION: Primary | ICD-10-CM

## 2024-09-25 DIAGNOSIS — Z79.899 MEDICATION MANAGEMENT: ICD-10-CM

## 2024-09-25 DIAGNOSIS — I25.118 CORONARY ARTERY DISEASE OF NATIVE ARTERY OF NATIVE HEART WITH STABLE ANGINA PECTORIS: ICD-10-CM

## 2024-09-25 LAB
ALBUMIN SERPL-MCNC: 3.7 G/DL (ref 3.5–5.2)
ALBUMIN/GLOB SERPL: 1.7 G/DL
ALP SERPL-CCNC: 64 U/L (ref 39–117)
ALT SERPL W P-5'-P-CCNC: 15 U/L (ref 1–41)
ANION GAP SERPL CALCULATED.3IONS-SCNC: 6 MMOL/L (ref 5–15)
AST SERPL-CCNC: 18 U/L (ref 1–40)
BASOPHILS # BLD AUTO: 0.05 10*3/MM3 (ref 0–0.2)
BASOPHILS NFR BLD AUTO: 0.7 % (ref 0–1.5)
BILIRUB SERPL-MCNC: 0.6 MG/DL (ref 0–1.2)
BUN SERPL-MCNC: 21 MG/DL (ref 8–23)
BUN/CREAT SERPL: 17.8 (ref 7–25)
CALCIUM SPEC-SCNC: 9.3 MG/DL (ref 8.2–9.6)
CHLORIDE SERPL-SCNC: 106 MMOL/L (ref 98–107)
CHOLEST SERPL-MCNC: 84 MG/DL (ref 0–200)
CO2 SERPL-SCNC: 28 MMOL/L (ref 22–29)
CREAT SERPL-MCNC: 1.18 MG/DL (ref 0.76–1.27)
DEPRECATED RDW RBC AUTO: 43.2 FL (ref 37–54)
EGFRCR SERPLBLD CKD-EPI 2021: 57.5 ML/MIN/1.73
EOSINOPHIL # BLD AUTO: 0.19 10*3/MM3 (ref 0–0.4)
EOSINOPHIL NFR BLD AUTO: 2.6 % (ref 0.3–6.2)
ERYTHROCYTE [DISTWIDTH] IN BLOOD BY AUTOMATED COUNT: 14.7 % (ref 12.3–15.4)
GLOBULIN UR ELPH-MCNC: 2.2 GM/DL
GLUCOSE SERPL-MCNC: 70 MG/DL (ref 65–99)
HBA1C MFR BLD: 6.4 % (ref 4.8–5.6)
HCT VFR BLD AUTO: 32 % (ref 37.5–51)
HDLC SERPL QL: 1.87
HDLC SERPL-MCNC: 45 MG/DL (ref 40–60)
HGB BLD-MCNC: 10.2 G/DL (ref 13–17.7)
IMM GRANULOCYTES # BLD AUTO: 0.03 10*3/MM3 (ref 0–0.05)
IMM GRANULOCYTES NFR BLD AUTO: 0.4 % (ref 0–0.5)
LDLC SERPL CALC-MCNC: 29 MG/DL (ref 0–100)
LYMPHOCYTES # BLD AUTO: 1.46 10*3/MM3 (ref 0.7–3.1)
LYMPHOCYTES NFR BLD AUTO: 20.3 % (ref 19.6–45.3)
MCH RBC QN AUTO: 26 PG (ref 26.6–33)
MCHC RBC AUTO-ENTMCNC: 31.9 G/DL (ref 31.5–35.7)
MCV RBC AUTO: 81.4 FL (ref 79–97)
MONOCYTES # BLD AUTO: 0.54 10*3/MM3 (ref 0.1–0.9)
MONOCYTES NFR BLD AUTO: 7.5 % (ref 5–12)
NEUTROPHILS NFR BLD AUTO: 4.92 10*3/MM3 (ref 1.7–7)
NEUTROPHILS NFR BLD AUTO: 68.5 % (ref 42.7–76)
NRBC BLD AUTO-RTO: 0 /100 WBC (ref 0–0.2)
PLATELET # BLD AUTO: 175 10*3/MM3 (ref 140–450)
PMV BLD AUTO: 11.3 FL (ref 6–12)
POTASSIUM SERPL-SCNC: 5.1 MMOL/L (ref 3.5–5.2)
PROT SERPL-MCNC: 5.9 G/DL (ref 6–8.5)
RBC # BLD AUTO: 3.93 10*6/MM3 (ref 4.14–5.8)
SODIUM SERPL-SCNC: 140 MMOL/L (ref 136–145)
TRIGL SERPL-MCNC: 25 MG/DL (ref 0–150)
VLDLC SERPL-MCNC: 10 MG/DL (ref 5–40)
WBC NRBC COR # BLD AUTO: 7.19 10*3/MM3 (ref 3.4–10.8)

## 2024-09-25 PROCEDURE — 1160F RVW MEDS BY RX/DR IN RCRD: CPT | Performed by: INTERNAL MEDICINE

## 2024-09-25 PROCEDURE — 80061 LIPID PANEL: CPT | Performed by: INTERNAL MEDICINE

## 2024-09-25 PROCEDURE — 90662 IIV NO PRSV INCREASED AG IM: CPT | Performed by: INTERNAL MEDICINE

## 2024-09-25 PROCEDURE — 1125F AMNT PAIN NOTED PAIN PRSNT: CPT | Performed by: INTERNAL MEDICINE

## 2024-09-25 PROCEDURE — 99214 OFFICE O/P EST MOD 30 MIN: CPT | Performed by: INTERNAL MEDICINE

## 2024-09-25 PROCEDURE — G0008 ADMIN INFLUENZA VIRUS VAC: HCPCS | Performed by: INTERNAL MEDICINE

## 2024-09-25 PROCEDURE — 85025 COMPLETE CBC W/AUTO DIFF WBC: CPT | Performed by: INTERNAL MEDICINE

## 2024-09-25 PROCEDURE — 36415 COLL VENOUS BLD VENIPUNCTURE: CPT | Performed by: INTERNAL MEDICINE

## 2024-09-25 PROCEDURE — 1159F MED LIST DOCD IN RCRD: CPT | Performed by: INTERNAL MEDICINE

## 2024-09-25 PROCEDURE — 80053 COMPREHEN METABOLIC PANEL: CPT | Performed by: INTERNAL MEDICINE

## 2024-09-25 PROCEDURE — 83036 HEMOGLOBIN GLYCOSYLATED A1C: CPT | Performed by: INTERNAL MEDICINE

## 2024-09-26 ENCOUNTER — TELEPHONE (OUTPATIENT)
Dept: INTERNAL MEDICINE | Facility: CLINIC | Age: 89
End: 2024-09-26
Payer: MEDICARE

## 2024-09-26 DIAGNOSIS — D64.9 ANEMIA, UNSPECIFIED TYPE: Primary | ICD-10-CM

## 2024-09-26 RX ORDER — FERROUS SULFATE 325(65) MG
325 TABLET ORAL 3 TIMES WEEKLY
Qty: 13 TABLET | Refills: 0 | Status: SHIPPED | OUTPATIENT
Start: 2024-09-27

## 2024-11-08 RX ORDER — PANTOPRAZOLE SODIUM 40 MG/1
40 TABLET, DELAYED RELEASE ORAL DAILY
Qty: 90 TABLET | Refills: 2 | Status: SHIPPED | OUTPATIENT
Start: 2024-11-08

## 2024-11-14 RX ORDER — TAMSULOSIN HYDROCHLORIDE 0.4 MG/1
1 CAPSULE ORAL 2 TIMES DAILY
Qty: 180 CAPSULE | Refills: 1 | Status: SHIPPED | OUTPATIENT
Start: 2024-11-14

## 2024-11-14 RX ORDER — PRAMIPEXOLE DIHYDROCHLORIDE 1.5 MG/1
TABLET ORAL
Qty: 90 TABLET | Refills: 1 | Status: SHIPPED | OUTPATIENT
Start: 2024-11-14

## 2025-01-13 RX ORDER — ISOSORBIDE MONONITRATE 30 MG/1
30 TABLET, EXTENDED RELEASE ORAL DAILY
Qty: 90 TABLET | Refills: 3 | Status: SHIPPED | OUTPATIENT
Start: 2025-01-13

## 2025-01-13 RX ORDER — METOPROLOL SUCCINATE 25 MG/1
25 TABLET, EXTENDED RELEASE ORAL DAILY
Qty: 90 TABLET | Refills: 3 | Status: SHIPPED | OUTPATIENT
Start: 2025-01-13

## 2025-01-21 RX ORDER — CLOPIDOGREL BISULFATE 75 MG/1
75 TABLET ORAL DAILY
Qty: 90 TABLET | Refills: 3 | Status: SHIPPED | OUTPATIENT
Start: 2025-01-21

## 2025-01-21 RX ORDER — ATORVASTATIN CALCIUM 40 MG/1
40 TABLET, FILM COATED ORAL NIGHTLY
Qty: 90 TABLET | Refills: 3 | Status: SHIPPED | OUTPATIENT
Start: 2025-01-21

## 2025-02-27 ENCOUNTER — OFFICE VISIT (OUTPATIENT)
Dept: INTERNAL MEDICINE | Facility: CLINIC | Age: OVER 89
End: 2025-02-27
Payer: MEDICARE

## 2025-02-27 ENCOUNTER — LAB (OUTPATIENT)
Dept: LAB | Facility: HOSPITAL | Age: OVER 89
End: 2025-02-27
Payer: MEDICARE

## 2025-02-27 VITALS
RESPIRATION RATE: 16 BRPM | WEIGHT: 168 LBS | TEMPERATURE: 97.7 F | HEIGHT: 72 IN | SYSTOLIC BLOOD PRESSURE: 106 MMHG | HEART RATE: 54 BPM | DIASTOLIC BLOOD PRESSURE: 58 MMHG | BODY MASS INDEX: 22.75 KG/M2 | OXYGEN SATURATION: 96 %

## 2025-02-27 DIAGNOSIS — I95.9 HYPOTENSION, UNSPECIFIED HYPOTENSION TYPE: Primary | ICD-10-CM

## 2025-02-27 LAB
ANION GAP SERPL CALCULATED.3IONS-SCNC: 7 MMOL/L (ref 5–15)
BASOPHILS # BLD AUTO: 0.04 10*3/MM3 (ref 0–0.2)
BASOPHILS NFR BLD AUTO: 0.5 % (ref 0–1.5)
BUN SERPL-MCNC: 34 MG/DL (ref 8–23)
BUN/CREAT SERPL: 21.5 (ref 7–25)
CALCIUM SPEC-SCNC: 9.1 MG/DL (ref 8.2–9.6)
CHLORIDE SERPL-SCNC: 107 MMOL/L (ref 98–107)
CO2 SERPL-SCNC: 27 MMOL/L (ref 22–29)
CREAT SERPL-MCNC: 1.58 MG/DL (ref 0.76–1.27)
DEPRECATED RDW RBC AUTO: 43.8 FL (ref 37–54)
EGFRCR SERPLBLD CKD-EPI 2021: 40.5 ML/MIN/1.73
EOSINOPHIL # BLD AUTO: 0.19 10*3/MM3 (ref 0–0.4)
EOSINOPHIL NFR BLD AUTO: 2.4 % (ref 0.3–6.2)
ERYTHROCYTE [DISTWIDTH] IN BLOOD BY AUTOMATED COUNT: 14.1 % (ref 12.3–15.4)
GLUCOSE SERPL-MCNC: 107 MG/DL (ref 65–99)
HCT VFR BLD AUTO: 34.1 % (ref 37.5–51)
HGB BLD-MCNC: 11 G/DL (ref 13–17.7)
IMM GRANULOCYTES # BLD AUTO: 0.03 10*3/MM3 (ref 0–0.05)
IMM GRANULOCYTES NFR BLD AUTO: 0.4 % (ref 0–0.5)
LYMPHOCYTES # BLD AUTO: 1.65 10*3/MM3 (ref 0.7–3.1)
LYMPHOCYTES NFR BLD AUTO: 21 % (ref 19.6–45.3)
MCH RBC QN AUTO: 27.4 PG (ref 26.6–33)
MCHC RBC AUTO-ENTMCNC: 32.3 G/DL (ref 31.5–35.7)
MCV RBC AUTO: 84.8 FL (ref 79–97)
MONOCYTES # BLD AUTO: 0.58 10*3/MM3 (ref 0.1–0.9)
MONOCYTES NFR BLD AUTO: 7.4 % (ref 5–12)
NEUTROPHILS NFR BLD AUTO: 5.35 10*3/MM3 (ref 1.7–7)
NEUTROPHILS NFR BLD AUTO: 68.3 % (ref 42.7–76)
NRBC BLD AUTO-RTO: 0 /100 WBC (ref 0–0.2)
PLATELET # BLD AUTO: 162 10*3/MM3 (ref 140–450)
PMV BLD AUTO: 11.2 FL (ref 6–12)
POTASSIUM SERPL-SCNC: 4.9 MMOL/L (ref 3.5–5.2)
RBC # BLD AUTO: 4.02 10*6/MM3 (ref 4.14–5.8)
SODIUM SERPL-SCNC: 141 MMOL/L (ref 136–145)
WBC NRBC COR # BLD AUTO: 7.84 10*3/MM3 (ref 3.4–10.8)

## 2025-02-27 PROCEDURE — 80048 BASIC METABOLIC PNL TOTAL CA: CPT | Performed by: INTERNAL MEDICINE

## 2025-02-27 PROCEDURE — 1125F AMNT PAIN NOTED PAIN PRSNT: CPT | Performed by: INTERNAL MEDICINE

## 2025-02-27 PROCEDURE — 1159F MED LIST DOCD IN RCRD: CPT | Performed by: INTERNAL MEDICINE

## 2025-02-27 PROCEDURE — 85025 COMPLETE CBC W/AUTO DIFF WBC: CPT | Performed by: INTERNAL MEDICINE

## 2025-02-27 PROCEDURE — 1160F RVW MEDS BY RX/DR IN RCRD: CPT | Performed by: INTERNAL MEDICINE

## 2025-02-27 PROCEDURE — 36415 COLL VENOUS BLD VENIPUNCTURE: CPT | Performed by: INTERNAL MEDICINE

## 2025-02-27 RX ORDER — METOPROLOL SUCCINATE 25 MG/1
25 TABLET, EXTENDED RELEASE ORAL DAILY
COMMUNITY

## 2025-02-27 NOTE — PROGRESS NOTES
Subjective   Jim Romero is a 89 y.o. male.     Chief Complaint   Patient presents with    Hypertension         History of Present Illness     The following portions of the patient's history were reviewed and updated as appropriate: allergies, current medications, past social history and problem list.    Outpatient Medications Marked as Taking for the 2/27/25 encounter (Office Visit) with Reji Gilman MD   Medication Sig Dispense Refill    Aspirin Low Dose 81 MG EC tablet TAKE 1 TABLET BY MOUTH DAILY 90 tablet 3    atorvastatin (LIPITOR) 40 MG tablet TAKE ONE TABLET BY MOUTH ONCE NIGHTLY 90 tablet 3    calcium carbonate-cholecalciferol 500-400 MG-UNIT tablet tablet Take 1 tablet by mouth Daily.      clopidogrel (PLAVIX) 75 MG tablet TAKE 1 TABLET BY MOUTH DAILY 90 tablet 3    ferrous sulfate 325 (65 FE) MG tablet Take 1 tablet by mouth 3 (Three) Times a Week. Monday/Wednesday/Friday 13 tablet 0    gabapentin (NEURONTIN) 100 MG capsule Take 2 capsules by mouth Daily. Can increase by 100 mg every week. Max is 600mg      isosorbide mononitrate (IMDUR) 30 MG 24 hr tablet TAKE 1 TABLET BY MOUTH DAILY 90 tablet 3    nitroglycerin (NITROSTAT) 0.4 MG SL tablet Place 1 tablet under the tongue Every 5 (Five) Minutes As Needed for Chest Pain (Systolic BP Greater Than 100). Take no more than 3 doses in 15 minutes. 30 tablet 0    pantoprazole (PROTONIX) 40 MG EC tablet TAKE 1 TABLET BY MOUTH DAILY 90 tablet 2    pramipexole (MIRAPEX) 1.5 MG tablet TAKE ONE TABLET BY MOUTH ONCE NIGHTLY 90 tablet 1    tamsulosin (FLOMAX) 0.4 MG capsule 24 hr capsule TAKE 1 CAPSULE BY MOUTH 2 TIMES A  capsule 1       Review of Systems   Respiratory:  Positive for shortness of breath.    Cardiovascular:  Negative for chest pain, palpitations and leg swelling.   Neurological:  Positive for weakness and light-headedness.       Objective   Vitals:    02/27/25 1446   BP: 106/58   Pulse: 54   Resp: 16   Temp: 97.7 °F (36.5 °C)   SpO2:  96%      Wt Readings from Last 3 Encounters:   02/27/25 76.2 kg (168 lb)   09/25/24 77.6 kg (171 lb)   08/16/24 74.8 kg (165 lb)    Body mass index is 22.78 kg/m².      Physical Exam  Constitutional:       Appearance: Normal appearance.   Cardiovascular:      Rate and Rhythm: Normal rate and regular rhythm.      Heart sounds: Normal heart sounds. No murmur heard.     Crescendo decrescendo murmur is present with a grade of 2/6.      No gallop.      Comments: Grade 2 WAGNER along the left upper sternal border  Pulmonary:      Effort: Pulmonary effort is normal. No respiratory distress.      Breath sounds: Normal breath sounds. No wheezing or rales.   Neurological:      Mental Status: He is alert.         ECG 12 Lead    Date/Time: 2/27/2025 3:44 PM  Performed by: Reji Gilman MD    Authorized by: Reji Gilman MD  Comparison: compared with previous ECG from 4/11/2024  Similar to previous ECG  Rhythm: sinus rhythm  Rate: normal  Conduction: right bundle branch block and left anterior fascicular block  ST Segments: ST segments normal  T Waves: T waves normal  QRS axis: normal  Other: no other findings    Clinical impression: abnormal EKG  Comments: Normal sinus rhythm.  Heart rate is 66.  There is a near complete right bundle branch block and a borderline left anterior hemiblock.  This is an abnormal EKG.  There is slight progression of the conducting system disease compared to the last EKG dated 4/11/2024.              Problems Addressed this Visit    None  Visit Diagnoses       Hypotension, unspecified hypotension type    -  Primary          Diagnoses         Codes Comments    Hypotension, unspecified hypotension type    -  Primary ICD-10-CM: I95.9  ICD-9-CM: 458.9           Assessment & Plan   In with complaints that he feels at times like he is going to die.  His blood pressure gets very low.  He has been as low as 70/40.  Maybe 90/60 when it is better.  Seems to be periodic.  Sometimes related to eating.  We know  has a large AAA.  He is not really having any pain or symptoms around the AAA however.  He remains on tamsulosin 0.4 mg daily along with isosorbide 30 mg and metoprolol XL 25 mg daily and the of which could lower his blood pressure a bit.  EKG is unrevealing.  Slightly low voltage.  Probable right bundle branch block with left anterior hemiblock.  The next step will be to discontinue the metoprolol but we are using that to decrease the pressure on his AAA.  He also has nocturia x 3-4 and I would also hope to not discontinue his Flomax to decrease any pressure from straining to void for the same reason.  At this point in time we will discontinue his isosorbide and see how he does without it.  I cannot get a good history of angina today.  He is getting 1 spell a day of weakness now the last for 20 or 30 minutes.  Will check a 48-hour Holter to make sure there is no significant arrhythmia contributing.  Symptoms have been going on for at least 6 months.  They seem like they have increased from once per week to once daily however.  Will also check a BMP and CBC today.    The above information was reviewed again today 02/27/25.  It continues to be accurate as reflected above and is unchanged.  History, physical and review of systems all reviewed and are unchanged.  Medications were reviewed today and continue the current dosing.                 Dragon disclaimer:   Part of this note may be an electronic transcription/translation of spoken language to printed text using the Dragon Dictation System.

## 2025-04-01 ENCOUNTER — OFFICE VISIT (OUTPATIENT)
Dept: INTERNAL MEDICINE | Facility: CLINIC | Age: OVER 89
End: 2025-04-01
Payer: MEDICARE

## 2025-04-01 VITALS
WEIGHT: 165 LBS | RESPIRATION RATE: 16 BRPM | TEMPERATURE: 97.5 F | DIASTOLIC BLOOD PRESSURE: 78 MMHG | HEART RATE: 77 BPM | SYSTOLIC BLOOD PRESSURE: 110 MMHG | HEIGHT: 72 IN | OXYGEN SATURATION: 97 % | BODY MASS INDEX: 22.35 KG/M2

## 2025-04-01 DIAGNOSIS — N17.9 AKI (ACUTE KIDNEY INJURY): ICD-10-CM

## 2025-04-01 DIAGNOSIS — I35.0 AORTIC STENOSIS, MILD: ICD-10-CM

## 2025-04-01 DIAGNOSIS — Z00.00 ENCOUNTER FOR ANNUAL WELLNESS EXAM IN MEDICARE PATIENT: Primary | ICD-10-CM

## 2025-04-01 DIAGNOSIS — I25.118 CORONARY ARTERY DISEASE OF NATIVE ARTERY OF NATIVE HEART WITH STABLE ANGINA PECTORIS: ICD-10-CM

## 2025-04-01 DIAGNOSIS — R73.02 IGT (IMPAIRED GLUCOSE TOLERANCE): Chronic | ICD-10-CM

## 2025-04-01 NOTE — PROGRESS NOTES
Subjective   The ABCs of the Annual Wellness Visit  Medicare Wellness Visit      Jim Romero is a 89 y.o. patient who presents for a Medicare Wellness Visit.    The following portions of the patient's history were reviewed and   updated as appropriate: allergies, current medications, past family history, past medical history, past social history, past surgical history, and problem list.    Compared to one year ago, the patient's physical   health is the same.  Compared to one year ago, the patient's mental   health is the same.    Recent Hospitalizations:  He was not admitted to the hospital during the last year.     Current Medical Providers:  Patient Care Team:  Reji Gilman MD as PCP - General (Internal Medicine)  Reji Gilman MD as PCP - Internal Medicine (Internal Medicine)    Outpatient Medications Prior to Visit   Medication Sig Dispense Refill    Aspirin Low Dose 81 MG EC tablet TAKE 1 TABLET BY MOUTH DAILY 90 tablet 3    atorvastatin (LIPITOR) 40 MG tablet TAKE ONE TABLET BY MOUTH ONCE NIGHTLY 90 tablet 3    calcium carbonate-cholecalciferol 500-400 MG-UNIT tablet tablet Take 1 tablet by mouth Daily.      clopidogrel (PLAVIX) 75 MG tablet TAKE 1 TABLET BY MOUTH DAILY 90 tablet 3    ferrous sulfate 325 (65 FE) MG tablet Take 1 tablet by mouth 3 (Three) Times a Week. Monday/Wednesday/Friday 13 tablet 0    gabapentin (NEURONTIN) 100 MG capsule Take 2 capsules by mouth Daily. Can increase by 100 mg every week. Max is 600mg      metoprolol succinate XL (TOPROL-XL) 25 MG 24 hr tablet Take 1 tablet by mouth Daily.      nitroglycerin (NITROSTAT) 0.4 MG SL tablet Place 1 tablet under the tongue Every 5 (Five) Minutes As Needed for Chest Pain (Systolic BP Greater Than 100). Take no more than 3 doses in 15 minutes. 30 tablet 0    pantoprazole (PROTONIX) 40 MG EC tablet TAKE 1 TABLET BY MOUTH DAILY 90 tablet 2    pramipexole (MIRAPEX) 1.5 MG tablet TAKE ONE TABLET BY MOUTH ONCE NIGHTLY 90 tablet 1     "tamsulosin (FLOMAX) 0.4 MG capsule 24 hr capsule TAKE 1 CAPSULE BY MOUTH 2 TIMES A  capsule 1    isosorbide mononitrate (IMDUR) 30 MG 24 hr tablet TAKE 1 TABLET BY MOUTH DAILY (Patient not taking: Reported on 4/1/2025) 90 tablet 3     No facility-administered medications prior to visit.     No opioid medication identified on active medication list. I have reviewed chart for other potential  high risk medication/s and harmful drug interactions in the elderly.      Aspirin is on active medication list. Aspirin use is indicated based on review of current medical condition/s. Pros and cons of this therapy have been discussed today. Benefits of this medication outweigh potential harm.  Patient has been encouraged to continue taking this medication.  .      Patient Active Problem List   Diagnosis    RLS (restless legs syndrome)    BPH (benign prostatic hyperplasia)    GERD (gastroesophageal reflux disease)    Sciatica    Chronic fatigue    IGT (impaired glucose tolerance)    Aortic stenosis, mild    Abnormal nuclear stress test    PFO (patent foramen ovale)    Infrarenal abdominal aortic aneurysm (AAA) without rupture    Unstable angina    Coronary artery disease of native artery of native heart with stable angina pectoris    S/P drug eluting coronary stent placement    Aneurysm of iliac artery     Advance Care Planning Advance Directive is on file.  ACP discussion was held with the patient during this visit. Patient has an advance directive in EMR which is still valid.             Objective   Vitals:    04/01/25 1346   BP: 110/78   Pulse: 77   Resp: 16   Temp: 97.5 °F (36.4 °C)   TempSrc: Temporal   SpO2: 97%   Weight: 74.8 kg (165 lb)   Height: 182.9 cm (72\")   PainSc: 0-No pain       Estimated body mass index is 22.38 kg/m² as calculated from the following:    Height as of this encounter: 182.9 cm (72\").    Weight as of this encounter: 74.8 kg (165 lb).    BMI is within normal parameters. No other follow-up for " BMI required.           Does the patient have evidence of cognitive impairment? No                                                                                               Health  Risk Assessment    Smoking Status:  Social History     Tobacco Use   Smoking Status Former    Current packs/day: 0.00    Average packs/day: 1.5 packs/day for 8.0 years (12.0 ttl pk-yrs)    Types: Cigarettes    Start date:     Quit date:     Years since quittin.2    Passive exposure: Past   Smokeless Tobacco Never     Alcohol Consumption:  Social History     Substance and Sexual Activity   Alcohol Use Yes    Comment: 1-2 x month       Fall Risk Screen  STEADI Fall Risk Assessment was completed, and patient is at LOW risk for falls.Assessment completed on:2025    Depression Screening   Little interest or pleasure in doing things? Not at all   Feeling down, depressed, or hopeless? Not at all   PHQ-2 Total Score 0      Health Habits and Functional and Cognitive Screenin/1/2025     1:45 PM   Functional & Cognitive Status   Do you have difficulty preparing food and eating? No   Do you have difficulty bathing yourself, getting dressed or grooming yourself? No   Do you have difficulty using the toilet? No   Do you have difficulty moving around from place to place? Yes   Do you have trouble with steps or getting out of a bed or a chair? Yes   Current Diet Well Balanced Diet   Dental Exam Up to date   Eye Exam Up to date   Exercise (times per week) 0 times per week   Current Exercises Include No Regular Exercise   Do you need help using the phone?  No   Are you deaf or do you have serious difficulty hearing?  Yes   Do you need help to go to places out of walking distance? Yes   Do you need help shopping? No   Do you need help preparing meals?  No   Do you need help with housework?  No   Do you need help with laundry? No   Do you need help taking your medications? No   Do you need help managing money? No   Do you ever  drive or ride in a car without wearing a seat belt? No   Have you felt unusual stress, anger or loneliness in the last month? No   Who do you live with? Spouse   If you need help, do you have trouble finding someone available to you? No   Have you been bothered in the last four weeks by sexual problems? No   Do you have difficulty concentrating, remembering or making decisions? No           Age-appropriate Screening Schedule:  Refer to the list below for future screening recommendations based on patient's age, sex and/or medical conditions. Orders for these recommended tests are listed in the plan section. The patient has been provided with a written plan.    Health Maintenance List  Health Maintenance   Topic Date Due    RSV Vaccine - Adults (1 - 1-dose 75+ series) Never done    COVID-19 Vaccine (6 - 2024-25 season) 09/01/2024    ANNUAL WELLNESS VISIT  03/25/2025    INFLUENZA VACCINE  07/01/2025    TDAP/TD VACCINES (2 - Td or Tdap) 05/17/2028    Pneumococcal Vaccine 50+  Completed    ZOSTER VACCINE  Discontinued                                                                                                                                                CMS Preventative Services Quick Reference  Risk Factors Identified During Encounter  Fall Risk-High or Moderate: Information on Fall Prevention Shared in After Visit Summary  Normal Balance     The above risks/problems have been discussed with the patient.  Pertinent information has been shared with the patient in the After Visit Summary.  An After Visit Summary and PPPS were made available to the patient.    Follow Up:   Next Medicare Wellness visit to be scheduled in 1 year.     Assessment & Plan  Encounter for annual wellness exam in Medicare patient              Follow Up:   No follow-ups on file.

## 2025-04-01 NOTE — PROGRESS NOTES
Subjective   Jim Romero is a 89 y.o. male.     Chief Complaint   Patient presents with    Medicare Wellness-subsequent    Hypotension         Hypotension  Symptoms include weakness.    Pertinent negative symptoms include no chest pain.        The following portions of the patient's history were reviewed and updated as appropriate: allergies, current medications, past social history and problem list.    Outpatient Medications Marked as Taking for the 4/1/25 encounter (Office Visit) with Reji Gilman MD   Medication Sig Dispense Refill    Aspirin Low Dose 81 MG EC tablet TAKE 1 TABLET BY MOUTH DAILY 90 tablet 3    atorvastatin (LIPITOR) 40 MG tablet TAKE ONE TABLET BY MOUTH ONCE NIGHTLY 90 tablet 3    calcium carbonate-cholecalciferol 500-400 MG-UNIT tablet tablet Take 1 tablet by mouth Daily.      clopidogrel (PLAVIX) 75 MG tablet TAKE 1 TABLET BY MOUTH DAILY 90 tablet 3    ferrous sulfate 325 (65 FE) MG tablet Take 1 tablet by mouth 3 (Three) Times a Week. Monday/Wednesday/Friday 13 tablet 0    gabapentin (NEURONTIN) 100 MG capsule Take 2 capsules by mouth Daily. Can increase by 100 mg every week. Max is 600mg      metoprolol succinate XL (TOPROL-XL) 25 MG 24 hr tablet Take 1 tablet by mouth Daily.      nitroglycerin (NITROSTAT) 0.4 MG SL tablet Place 1 tablet under the tongue Every 5 (Five) Minutes As Needed for Chest Pain (Systolic BP Greater Than 100). Take no more than 3 doses in 15 minutes. 30 tablet 0    pantoprazole (PROTONIX) 40 MG EC tablet TAKE 1 TABLET BY MOUTH DAILY 90 tablet 2    pramipexole (MIRAPEX) 1.5 MG tablet TAKE ONE TABLET BY MOUTH ONCE NIGHTLY 90 tablet 1    tamsulosin (FLOMAX) 0.4 MG capsule 24 hr capsule TAKE 1 CAPSULE BY MOUTH 2 TIMES A  capsule 1       Review of Systems   Respiratory:  Positive for shortness of breath.    Cardiovascular:  Negative for chest pain, palpitations and leg swelling.   Neurological:  Positive for weakness and light-headedness.       Objective    Vitals:    04/01/25 1346   BP: 110/78   Pulse: 77   Resp: 16   Temp: 97.5 °F (36.4 °C)   SpO2: 97%      Wt Readings from Last 3 Encounters:   04/01/25 74.8 kg (165 lb)   02/27/25 76.2 kg (168 lb)   09/25/24 77.6 kg (171 lb)    Body mass index is 22.38 kg/m².      Physical Exam  Constitutional:       Appearance: Normal appearance.   Cardiovascular:      Rate and Rhythm: Normal rate and regular rhythm.      Heart sounds: Murmur heard.      Crescendo decrescendo systolic murmur is present with a grade of 2/6.      No gallop.      Comments: Grade 2 WAGNER along the left upper sternal border, grade 1-2 diastolic blow  Pulmonary:      Effort: Pulmonary effort is normal. No respiratory distress.      Breath sounds: Normal breath sounds. No wheezing or rales.   Neurological:      Mental Status: He is alert.             Problems Addressed this Visit          Cardiac and Vasculature    Aortic stenosis, mild (Chronic)    Relevant Orders    Adult Transthoracic Echo Complete W/ Cont if Necessary Per Protocol    Coronary artery disease of native artery of native heart with stable angina pectoris       Endocrine and Metabolic    IGT (impaired glucose tolerance) (Chronic)     Other Visit Diagnoses         Encounter for annual wellness exam in Medicare patient    -  Primary      SUNNI (acute kidney injury)        Relevant Orders    Basic Metabolic Panel          Diagnoses         Codes Comments      Encounter for annual wellness exam in Medicare patient    -  Primary ICD-10-CM: Z00.00  ICD-9-CM: V70.0       SUNNI (acute kidney injury)     ICD-10-CM: N17.9  ICD-9-CM: 584.9       Aortic stenosis, mild     ICD-10-CM: I35.0  ICD-9-CM: 424.1       Coronary artery disease of native artery of native heart with stable angina pectoris     ICD-10-CM: I25.118  ICD-9-CM: 414.01, 413.9       IGT (impaired glucose tolerance)     ICD-10-CM: R73.02  ICD-9-CM: 790.22           Assessment & Plan   In 2/27/2025 with complaints that he feels at times like  he is going to die.  His blood pressure gets very low.  He has been as low as 70/40.  Maybe 90/60 when it is better.  Seems to be periodic.  Sometimes related to eating.  We stopped his isosorbide and he has had no symptoms at all since that time.  He had been having problems for 6 months prior to that.  Annual lab work was done September 2024 including CBC, CMP, lipids, A1c.  Will recheck a BMP today given his recent creatinine bump.    We know has a large AAA.  He is not really having any pain or symptoms around the AAA however.  Also history of CAD and cardiomyopathy.  He remains on tamsulosin 0.4 mg daily and metoprolol XL 25 mg daily and the of which could lower his blood pressure a bit.  The next step will be to discontinue the metoprolol but we are using that to decrease the pressure on his AAA.  He also has nocturia x 3-4 and I would also hope to not discontinue his Flomax to decrease any pressure from straining to void for the same reason.  He does complain about chronic fatigue which may well be age-related.  Follow-up in 3 months.    The above information was reviewed again today 04/01/25.  It continues to be accurate as reflected above and is unchanged.  History, physical and review of systems all reviewed and are unchanged.  Medications were reviewed today and continue the current dosing.         Dragon disclaimer:   Part of this note may be an electronic transcription/translation of spoken language to printed text using the Dragon Dictation System.

## 2025-04-22 ENCOUNTER — LAB (OUTPATIENT)
Dept: LAB | Facility: HOSPITAL | Age: OVER 89
End: 2025-04-22
Payer: MEDICARE

## 2025-04-22 LAB
ANION GAP SERPL CALCULATED.3IONS-SCNC: 5.6 MMOL/L (ref 5–15)
BUN SERPL-MCNC: 25 MG/DL (ref 8–23)
BUN/CREAT SERPL: 18.4 (ref 7–25)
CALCIUM SPEC-SCNC: 9.4 MG/DL (ref 8.2–9.6)
CHLORIDE SERPL-SCNC: 106 MMOL/L (ref 98–107)
CO2 SERPL-SCNC: 26.4 MMOL/L (ref 22–29)
CREAT SERPL-MCNC: 1.36 MG/DL (ref 0.76–1.27)
EGFRCR SERPLBLD CKD-EPI 2021: 48.2 ML/MIN/1.73
GLUCOSE SERPL-MCNC: 92 MG/DL (ref 65–99)
POTASSIUM SERPL-SCNC: 4.5 MMOL/L (ref 3.5–5.2)
SODIUM SERPL-SCNC: 138 MMOL/L (ref 136–145)

## 2025-04-22 PROCEDURE — 80048 BASIC METABOLIC PNL TOTAL CA: CPT | Performed by: INTERNAL MEDICINE

## 2025-05-01 RX ORDER — PRAMIPEXOLE DIHYDROCHLORIDE 1.5 MG/1
1.5 TABLET ORAL NIGHTLY
Qty: 90 TABLET | Refills: 1 | Status: SHIPPED | OUTPATIENT
Start: 2025-05-01

## 2025-06-30 RX ORDER — TAMSULOSIN HYDROCHLORIDE 0.4 MG/1
1 CAPSULE ORAL 2 TIMES DAILY
Qty: 180 CAPSULE | Refills: 1 | Status: SHIPPED | OUTPATIENT
Start: 2025-06-30

## 2025-07-17 ENCOUNTER — OFFICE VISIT (OUTPATIENT)
Dept: INTERNAL MEDICINE | Facility: CLINIC | Age: OVER 89
End: 2025-07-17
Payer: MEDICARE

## 2025-07-17 VITALS
HEIGHT: 72 IN | HEART RATE: 66 BPM | RESPIRATION RATE: 16 BRPM | TEMPERATURE: 98.6 F | BODY MASS INDEX: 22.35 KG/M2 | DIASTOLIC BLOOD PRESSURE: 68 MMHG | WEIGHT: 165 LBS | OXYGEN SATURATION: 97 % | SYSTOLIC BLOOD PRESSURE: 124 MMHG

## 2025-07-17 DIAGNOSIS — R53.83 OTHER FATIGUE: ICD-10-CM

## 2025-07-17 DIAGNOSIS — I25.118 CORONARY ARTERY DISEASE OF NATIVE ARTERY OF NATIVE HEART WITH STABLE ANGINA PECTORIS: ICD-10-CM

## 2025-07-17 DIAGNOSIS — D50.0 IRON DEFICIENCY ANEMIA SECONDARY TO BLOOD LOSS (CHRONIC): ICD-10-CM

## 2025-07-17 DIAGNOSIS — R73.02 IGT (IMPAIRED GLUCOSE TOLERANCE): Primary | Chronic | ICD-10-CM

## 2025-07-17 NOTE — PROGRESS NOTES
Subjective   Jim Romero is a 89 y.o. male.     Chief Complaint   Patient presents with    Cardiomyopathy    Hyperglycemia         Cardiomyopathy  Chronicity:  Chronic  Frequency:  Constantly  Symptoms: no chest pain    Hyperglycemia  Chronicity:  Chronic  Symptoms: no chest pain         The following portions of the patient's history were reviewed and updated as appropriate: allergies, current medications, past social history and problem list.    Outpatient Medications Marked as Taking for the 7/17/25 encounter (Office Visit) with Reji Gilman MD   Medication Sig Dispense Refill    Aspirin Low Dose 81 MG EC tablet TAKE 1 TABLET BY MOUTH DAILY 90 tablet 3    atorvastatin (LIPITOR) 40 MG tablet TAKE ONE TABLET BY MOUTH ONCE NIGHTLY 90 tablet 3    calcium carbonate-cholecalciferol 500-400 MG-UNIT tablet tablet Take 1 tablet by mouth Daily.      clopidogrel (PLAVIX) 75 MG tablet TAKE 1 TABLET BY MOUTH DAILY 90 tablet 3    gabapentin (NEURONTIN) 100 MG capsule Take 2 capsules by mouth Daily. Can increase by 100 mg every week. Max is 600mg      metoprolol succinate XL (TOPROL-XL) 25 MG 24 hr tablet Take 1 tablet by mouth Daily.      nitroglycerin (NITROSTAT) 0.4 MG SL tablet Place 1 tablet under the tongue Every 5 (Five) Minutes As Needed for Chest Pain (Systolic BP Greater Than 100). Take no more than 3 doses in 15 minutes. 30 tablet 0    pantoprazole (PROTONIX) 40 MG EC tablet TAKE 1 TABLET BY MOUTH DAILY 90 tablet 2    pramipexole (MIRAPEX) 1.5 MG tablet TAKE ONE TABLET BY MOUTH ONCE NIGHTLY 90 tablet 1    tamsulosin (FLOMAX) 0.4 MG capsule 24 hr capsule TAKE 1 CAPSULE BY MOUTH 2 TIMES A  capsule 1       Review of Systems   Respiratory:  Negative for shortness of breath.    Cardiovascular:  Positive for leg swelling. Negative for chest pain and palpitations.   Neurological:  Negative for light-headedness.       Objective   Vitals:    07/17/25 1409   BP: 124/68   Pulse: 66   Resp: 16   Temp: 98.6 °F  (37 °C)   SpO2: 97%      Wt Readings from Last 3 Encounters:   07/17/25 74.8 kg (165 lb)   04/01/25 74.8 kg (165 lb)   02/27/25 76.2 kg (168 lb)    Body mass index is 22.38 kg/m².      Physical Exam  Constitutional:       Appearance: Normal appearance.   Cardiovascular:      Rate and Rhythm: Normal rate and regular rhythm.      Heart sounds: Murmur heard.      Crescendo decrescendo systolic murmur is present with a grade of 2/6.      No gallop.      Comments: Grade 2 WAGNER along the left upper sternal border, grade 2 diastolic blow  Pulmonary:      Effort: Pulmonary effort is normal. No respiratory distress.      Breath sounds: Normal breath sounds. No wheezing or rales.   Neurological:      Mental Status: He is alert.             Problems Addressed this Visit          Cardiac and Vasculature    Coronary artery disease of native artery of native heart with stable angina pectoris       Endocrine and Metabolic    IGT (impaired glucose tolerance) - Primary (Chronic)     Diagnoses         Codes Comments      IGT (impaired glucose tolerance)    -  Primary ICD-10-CM: R73.02  ICD-9-CM: 790.22       Coronary artery disease of native artery of native heart with stable angina pectoris     ICD-10-CM: I25.118  ICD-9-CM: 414.01, 413.9           Assessment & Plan   In for recheck of hypertension, impaired glucose tolerance and CAD.  Last visit he was having low blood pressure and lightheadedness.  That resolved completely when he stopped his isosorbide.  He feels great now.  Energy is poor.  He can barely walk one half of a block without being exhausted.  Annual lab work was done September 2024 including CBC, CMP, lipids, A1c.      We know has a large AAA.  He is not really having any pain or symptoms around the AAA however.  Also history of CAD and cardiomyopathy.  He remains on tamsulosin 0.4 mg daily and metoprolol XL 25 mg daily.  He also has nocturia x 4-5 and I would also hope to not discontinue his Flomax to decrease any  pressure from straining to void for the same reason.  He does complain about chronic fatigue which may well be age-related.  Follow-up in 3 months.    He is off of iron now.  It was making his stools black and difficult to eliminate.  Will check lab work today which will include CBC, BMP, iron profile, ferritin and A1c.    The above information was reviewed again today 07/17/25.  It continues to be accurate as reflected above and is unchanged.  History, physical and review of systems all reviewed and are unchanged.  Medications were reviewed today and continue the current dosing.           Dragon disclaimer:   Part of this note may be an electronic transcription/translation of spoken language to printed text using the Dragon Dictation System.

## 2025-08-04 RX ORDER — PANTOPRAZOLE SODIUM 40 MG/1
40 TABLET, DELAYED RELEASE ORAL DAILY
Qty: 90 TABLET | Refills: 2 | Status: SHIPPED | OUTPATIENT
Start: 2025-08-04

## 2025-08-05 ENCOUNTER — LAB (OUTPATIENT)
Dept: LAB | Facility: HOSPITAL | Age: OVER 89
End: 2025-08-05
Payer: MEDICARE

## 2025-08-05 ENCOUNTER — RESULTS FOLLOW-UP (OUTPATIENT)
Dept: INTERNAL MEDICINE | Facility: CLINIC | Age: OVER 89
End: 2025-08-05
Payer: MEDICARE

## 2025-08-05 LAB
ANION GAP SERPL CALCULATED.3IONS-SCNC: 7.9 MMOL/L (ref 5–15)
BASOPHILS # BLD AUTO: 0.06 10*3/MM3 (ref 0–0.2)
BASOPHILS NFR BLD AUTO: 0.8 % (ref 0–1.5)
BUN SERPL-MCNC: 21 MG/DL (ref 8–23)
BUN/CREAT SERPL: 15.9 (ref 7–25)
CALCIUM SPEC-SCNC: 8.9 MG/DL (ref 8.2–9.6)
CHLORIDE SERPL-SCNC: 104 MMOL/L (ref 98–107)
CO2 SERPL-SCNC: 27.1 MMOL/L (ref 22–29)
CREAT SERPL-MCNC: 1.32 MG/DL (ref 0.76–1.27)
DEPRECATED RDW RBC AUTO: 45.1 FL (ref 37–54)
EGFRCR SERPLBLD CKD-EPI 2021: 50 ML/MIN/1.73
EOSINOPHIL # BLD AUTO: 0.18 10*3/MM3 (ref 0–0.4)
EOSINOPHIL NFR BLD AUTO: 2.5 % (ref 0.3–6.2)
ERYTHROCYTE [DISTWIDTH] IN BLOOD BY AUTOMATED COUNT: 13.8 % (ref 12.3–15.4)
GLUCOSE SERPL-MCNC: 200 MG/DL (ref 65–99)
HBA1C MFR BLD: 6 % (ref 4.8–5.6)
HCT VFR BLD AUTO: 35.2 % (ref 37.5–51)
HGB BLD-MCNC: 10.7 G/DL (ref 13–17.7)
IMM GRANULOCYTES # BLD AUTO: 0.03 10*3/MM3 (ref 0–0.05)
IMM GRANULOCYTES NFR BLD AUTO: 0.4 % (ref 0–0.5)
IRON 24H UR-MRATE: 63 MCG/DL (ref 59–158)
IRON SATN MFR SERPL: 22 % (ref 20–50)
LYMPHOCYTES # BLD AUTO: 1.81 10*3/MM3 (ref 0.7–3.1)
LYMPHOCYTES NFR BLD AUTO: 25.3 % (ref 19.6–45.3)
MCH RBC QN AUTO: 27.1 PG (ref 26.6–33)
MCHC RBC AUTO-ENTMCNC: 30.4 G/DL (ref 31.5–35.7)
MCV RBC AUTO: 89.1 FL (ref 79–97)
MONOCYTES # BLD AUTO: 0.44 10*3/MM3 (ref 0.1–0.9)
MONOCYTES NFR BLD AUTO: 6.1 % (ref 5–12)
NEUTROPHILS NFR BLD AUTO: 4.64 10*3/MM3 (ref 1.7–7)
NEUTROPHILS NFR BLD AUTO: 64.9 % (ref 42.7–76)
NRBC BLD AUTO-RTO: 0 /100 WBC (ref 0–0.2)
PLATELET # BLD AUTO: 163 10*3/MM3 (ref 140–450)
PMV BLD AUTO: 11.2 FL (ref 6–12)
POTASSIUM SERPL-SCNC: 4.7 MMOL/L (ref 3.5–5.2)
RBC # BLD AUTO: 3.95 10*6/MM3 (ref 4.14–5.8)
SODIUM SERPL-SCNC: 139 MMOL/L (ref 136–145)
TIBC SERPL-MCNC: 280 MCG/DL (ref 298–536)
TRANSFERRIN SERPL-MCNC: 188 MG/DL (ref 200–360)
WBC NRBC COR # BLD AUTO: 7.16 10*3/MM3 (ref 3.4–10.8)

## 2025-08-05 PROCEDURE — 85025 COMPLETE CBC W/AUTO DIFF WBC: CPT | Performed by: INTERNAL MEDICINE

## 2025-08-05 PROCEDURE — 83540 ASSAY OF IRON: CPT | Performed by: INTERNAL MEDICINE

## 2025-08-05 PROCEDURE — 80048 BASIC METABOLIC PNL TOTAL CA: CPT | Performed by: INTERNAL MEDICINE

## 2025-08-05 PROCEDURE — 83036 HEMOGLOBIN GLYCOSYLATED A1C: CPT | Performed by: INTERNAL MEDICINE

## 2025-08-05 PROCEDURE — 84466 ASSAY OF TRANSFERRIN: CPT | Performed by: INTERNAL MEDICINE

## (undated) DEVICE — Device

## (undated) DEVICE — PK CATH CARD 40

## (undated) DEVICE — THE VASC BAND HEMOSTAT IS A COMPRESSION DEVICE TO ASSIST HEMOSTASIS OF ARTERIAL, VENOUS AND HEMODIALYSIS PERCUTANEOUS ACCESS SITES.: Brand: VASC BAND™ HEMOSTAT

## (undated) DEVICE — GLIDESHEATH SLENDER STAINLESS STEEL KIT: Brand: GLIDESHEATH SLENDER

## (undated) DEVICE — NC TREK NEO™ CORONARY DILATATION CATHETER 4.00 MM X 8 MM / RAPID-EXCHANGE: Brand: NC TREK NEO™

## (undated) DEVICE — KT MANIFLD CARDIAC

## (undated) DEVICE — CATH GUIDELINER 6F 135CM

## (undated) DEVICE — NC TREK NEO™ CORONARY DILATATION CATHETER 4.00 MM X 20 MM / RAPID-EXCHANGE: Brand: NC TREK NEO™

## (undated) DEVICE — GUIDELINER CATHETERS ARE INTENDED TO BE USED IN CONJUNCTION WITH GUIDE CATHETERS TO ACCESS DISCRETE REGIONS OF THE CORONARY AND/OR PERIPHERAL VASCULATURE, AND TO FACILITATE PLACEMENT OF INTERVENTIONAL DEVICES.: Brand: GUIDELINER® V3 CATHETER

## (undated) DEVICE — GW EMR FIX EXCHG J STD .035 3MM 260CM

## (undated) DEVICE — CATH DIAG IMPULSE FL3.5 5F 100CM

## (undated) DEVICE — GW RUNTHROUGH NS STR RESHP .014 3X180CM

## (undated) DEVICE — CATH DIAG IMPULSE FR5 5F 100CM

## (undated) DEVICE — RADIFOCUS GLIDEWIRE: Brand: GLIDEWIRE

## (undated) DEVICE — DEV INDEFLATOR P/N 580289

## (undated) DEVICE — SHEATH INTRO PINN PERIPH .038 6F10CM

## (undated) DEVICE — SHEATH INTRO PINN PERIPH .038 8F10CM

## (undated) DEVICE — DEV CLS VESL PERCLOSE PROGLIDE

## (undated) DEVICE — TREK CORONARY DILATATION CATHETER 3.50 MM X 20 MM / RAPID-EXCHANGE: Brand: TREK

## (undated) DEVICE — 6F .070 AL.75 100CM: Brand: CORDIS

## (undated) DEVICE — 6F .070 JR 4 100CM: Brand: CORDIS

## (undated) DEVICE — BALN PTCA TAKERURX DIL 2.5X15MM

## (undated) DEVICE — RUNTHROUGH NS EXTRA FLOPPY PTCA GUIDEWIRE: Brand: RUNTHROUGH